# Patient Record
Sex: MALE | Race: WHITE | NOT HISPANIC OR LATINO | Employment: OTHER | URBAN - METROPOLITAN AREA
[De-identification: names, ages, dates, MRNs, and addresses within clinical notes are randomized per-mention and may not be internally consistent; named-entity substitution may affect disease eponyms.]

---

## 2017-01-06 DIAGNOSIS — F51.01 PRIMARY INSOMNIA: ICD-10-CM

## 2017-01-06 RX ORDER — ZOLPIDEM TARTRATE 5 MG/1
5 TABLET ORAL NIGHTLY
Qty: 30 TABLET | Refills: 0 | Status: SHIPPED | OUTPATIENT
Start: 2017-01-06 | End: 2017-02-13 | Stop reason: SDUPTHER

## 2017-01-06 NOTE — TELEPHONE ENCOUNTER
Please call patient and let him know prescription is ready to be picked up.     Reviewed [place]  Aware and verified that 12/29/16 prescription written by Dr. Archibald was never filled.

## 2017-01-12 ENCOUNTER — TELEPHONE (OUTPATIENT)
Dept: INTERNAL MEDICINE | Facility: CLINIC | Age: 67
End: 2017-01-12

## 2017-01-12 NOTE — TELEPHONE ENCOUNTER
----- Message from Noreen Lara sent at 1/11/2017  1:53 PM CST -----  Contact: Patient  Type: Sooner appointment than  is able to schedule    When is the first available appointment? 2/16/17    What is the nature of the appointment? Has not been able to eat much for a few weeks.  He thinks it is getting worse. He is nauseated.      What appointment type:     Comments:  The patient was seen at the  in [place] and believes he needs to see Dr. Thapa.  Please call him at [phone].    Thanks!

## 2017-01-19 ENCOUNTER — CLINICAL SUPPORT (OUTPATIENT)
Dept: ELECTROPHYSIOLOGY | Facility: CLINIC | Age: 67
End: 2017-01-19
Payer: MEDICARE

## 2017-01-19 DIAGNOSIS — R55 SYNCOPE: ICD-10-CM

## 2017-01-19 DIAGNOSIS — Z95.818 STATUS POST PLACEMENT OF IMPLANTABLE LOOP RECORDER: ICD-10-CM

## 2017-01-19 PROCEDURE — 93297 REM INTERROG DEV EVAL ICPMS: CPT | Mod: ,,, | Performed by: INTERNAL MEDICINE

## 2017-01-19 PROCEDURE — 93299 LOOP RECORDER REMOTE: CPT | Mod: PBBFAC | Performed by: INTERNAL MEDICINE

## 2017-01-20 ENCOUNTER — TELEPHONE (OUTPATIENT)
Dept: INTERNAL MEDICINE | Facility: CLINIC | Age: 67
End: 2017-01-20

## 2017-01-20 ENCOUNTER — OFFICE VISIT (OUTPATIENT)
Dept: PHYSICAL MEDICINE AND REHAB | Facility: CLINIC | Age: 67
End: 2017-01-20
Payer: MEDICARE

## 2017-01-20 VITALS
SYSTOLIC BLOOD PRESSURE: 148 MMHG | WEIGHT: 253.88 LBS | HEART RATE: 68 BPM | HEIGHT: 68 IN | BODY MASS INDEX: 38.48 KG/M2 | DIASTOLIC BLOOD PRESSURE: 63 MMHG

## 2017-01-20 DIAGNOSIS — M46.92 SPONDYLITIS, CERVICAL: ICD-10-CM

## 2017-01-20 DIAGNOSIS — M54.40 CHRONIC LOW BACK PAIN WITH SCIATICA, SCIATICA LATERALITY UNSPECIFIED, UNSPECIFIED BACK PAIN LATERALITY: ICD-10-CM

## 2017-01-20 DIAGNOSIS — M19.041 PRIMARY OSTEOARTHRITIS OF RIGHT HAND: Primary | ICD-10-CM

## 2017-01-20 DIAGNOSIS — G89.29 CHRONIC NECK PAIN: ICD-10-CM

## 2017-01-20 DIAGNOSIS — G89.29 CHRONIC LOW BACK PAIN WITH SCIATICA, SCIATICA LATERALITY UNSPECIFIED, UNSPECIFIED BACK PAIN LATERALITY: ICD-10-CM

## 2017-01-20 DIAGNOSIS — M50.30 DDD (DEGENERATIVE DISC DISEASE), CERVICAL: ICD-10-CM

## 2017-01-20 DIAGNOSIS — M54.2 CHRONIC NECK PAIN: ICD-10-CM

## 2017-01-20 DIAGNOSIS — E11.42 DIABETIC PERIPHERAL NEUROPATHY: ICD-10-CM

## 2017-01-20 PROCEDURE — 99214 OFFICE O/P EST MOD 30 MIN: CPT | Mod: S$PBB,,, | Performed by: PHYSICAL MEDICINE & REHABILITATION

## 2017-01-20 PROCEDURE — 99999 PR PBB SHADOW E&M-EST. PATIENT-LVL III: CPT | Mod: PBBFAC,,, | Performed by: PHYSICAL MEDICINE & REHABILITATION

## 2017-01-20 PROCEDURE — 99213 OFFICE O/P EST LOW 20 MIN: CPT | Mod: PBBFAC | Performed by: PHYSICAL MEDICINE & REHABILITATION

## 2017-01-20 RX ORDER — HYDROCODONE BITARTRATE AND ACETAMINOPHEN 10; 325 MG/1; MG/1
1 TABLET ORAL 4 TIMES DAILY
Qty: 120 TABLET | Refills: 0 | Status: SHIPPED | OUTPATIENT
Start: 2017-02-20 | End: 2017-04-18

## 2017-01-20 RX ORDER — HYDROCODONE BITARTRATE AND ACETAMINOPHEN 10; 325 MG/1; MG/1
1 TABLET ORAL 4 TIMES DAILY
Qty: 120 TABLET | Refills: 0 | Status: SHIPPED | OUTPATIENT
Start: 2017-03-20 | End: 2017-02-20 | Stop reason: SDUPTHER

## 2017-01-20 RX ORDER — HYDROCODONE BITARTRATE AND ACETAMINOPHEN 10; 325 MG/1; MG/1
1 TABLET ORAL 4 TIMES DAILY
Qty: 120 TABLET | Refills: 0 | Status: SHIPPED | OUTPATIENT
Start: 2017-01-20 | End: 2017-02-19

## 2017-01-20 RX ORDER — DICLOFENAC SODIUM 10 MG/G
2 GEL TOPICAL 4 TIMES DAILY
Qty: 3 TUBE | Refills: 2 | Status: SHIPPED | OUTPATIENT
Start: 2017-01-20 | End: 2017-10-05

## 2017-01-20 NOTE — MR AVS SNAPSHOT
[facility]  [address]  [facility] [address]  Phone: [phone]                  Vinh Castro   2017 3:30 PM   Office Visit    Description:  Male : 1950   Provider:  Rhea Gaviria MD   Department:  [facility]           Reason for Visit     Hip Pain           Diagnoses this Visit        Comments    Primary osteoarthritis of right hand    -  Primary     Diabetic peripheral neuropathy         Chronic low back pain with sciatica, sciatica laterality unspecified, unspecified back pain laterality         DDD (degenerative disc disease), cervical         Chronic neck pain         Spondylitis, cervical                To Do List           Future Appointments        Provider Department Dept Phone    3/24/2017 2:30 PM [facility] [phone]      Goals (5 Years of Data)     None      Follow-Up and Disposition     Return in about 3 months (around 2017).       These Medications        Disp Refills Start End    hydrocodone-acetaminophen 10-325mg (NORCO)  mg Tab 120 tablet 0 2017    Take 1 tablet by mouth 4 (four) times daily. - Oral    Pharmacy: [id number] - [place], [place] - [address] Ph #: [phone]       hydrocodone-acetaminophen 10-325mg (NORCO)  mg Tab 120 tablet 0 2017 3/22/2017    Take 1 tablet by mouth 4 (four) times daily. - Oral    Pharmacy: [facility] - [place], [place] - [address] Ph #: [phone]       hydrocodone-acetaminophen 10-325mg (NORCO)  mg Tab 120 tablet 0 3/20/2017 2017    Take 1 tablet by mouth 4 (four) times daily. - Oral    Pharmacy: [facility] - [place], [place] - [address] Ph #: [phone]       diclofenac sodium (VOLTAREN) 1 % Gel 3 Tube 2 2017    Apply 2 g topically 4  (four) times daily. Apply to both hand - Topical    Pharmacy: [id number] - [place], [place] - [address] AT [address] of [facility] #: [phone]         [facility]     [facility] On Call Nurse Care Line - 24/7 Assistance  Registered nurses in the [facility] provide clinical advisement, health education, appointment booking, and other advisory services.  Call for this free service at [phone].             Medications           Message regarding Medications     Verify the changes and/or additions to your medication regime listed below are the same as discussed with your clinician today.  If any of these changes or additions are incorrect, please notify your healthcare provider.        START taking these NEW medications        Refills    hydrocodone-acetaminophen 10-325mg (NORCO)  mg Tab 0    Starting on: 2/20/2017    Sig: Take 1 tablet by mouth 4 (four) times daily.    Class: Print    Route: Oral    hydrocodone-acetaminophen 10-325mg (NORCO)  mg Tab 0    Starting on: 3/20/2017    Sig: Take 1 tablet by mouth 4 (four) times daily.    Class: Print    Route: Oral    diclofenac sodium (VOLTAREN) 1 % Gel 2    Sig: Apply 2 g topically 4 (four) times daily. Apply to both hand    Class: Normal    Route: Topical           Verify that the below list of medications is an accurate representation of the medications you are currently taking.  If none reported, the list may be blank. If incorrect, please contact your healthcare provider. Carry this list with you in case of emergency.           Current Medications     alprazolam (XANAX) 0.5 MG tablet Take 0.5 mg by mouth daily as needed.     aspirin 81 MG Chew Take 81 mg by mouth once daily.    ASPIRIN/ACETAMINOPHEN/CAFFEINE (EXCEDRIN EXTRA STRENGTH ORAL) Take by mouth as needed.    blood sugar diagnostic (ACCU-CHEK JANEY PLUS TEST STRP) Strp Patient to check blood sugar 6 x's per day--Dx Code 250.62,  357.2    calcium  "carbonate-simethicone (MAALOX ADVANCED) 1,000-60 mg Chew Take 2 tablets by mouth 3 (three) times daily as needed.    cholecalciferol, vitamin D3, (VITAMIN D3) 5,000 unit Tab Take 5,000 Units by mouth. Takes one tablet every day except on Sun and Wed    ciprofloxacin HCl (CILOXAN) 0.3 % ophthalmic solution     CREON 24,000-76,000 -120,000 unit capsule TAKE 2 CAPSULES BY MOUTH THREE TIMES DAILY WITH MEALS, 2 WITH MEALS AND 1 WITH SNACK    DEXTROSE (GLUCOSE) Chew Take by mouth as needed.    ELIQUIS 5 mg Tab TAKE 1 TABLET BY MOUTH TWICE DAILY    esomeprazole (NEXIUM) 40 MG capsule TAKE 1 CAPSULE BY MOUTH EVERY DAY    ferrous sulfate 325 mg (65 mg iron) Tab tablet TAKE 1 TABLET BY MOUTH EVERY DAY    fexofenadine (ALLEGRA) 180 MG tablet Take 180 mg by mouth once daily.    fluocinolone-shower cap 0.01 % Oil     fluticasone (FLONASE) 50 mcg/actuation nasal spray SHAKE WELL AND USE 2 SPRAYS IN EACH NOSTRIL EVERY DAY    furosemide (LASIX) 80 MG tablet Take 80 mg by mouth once daily.    guaifenesin (MUCINEX) 600 mg 12 hr tablet Take 600 mg by mouth continuous prn for Congestion. Prn      HUMULIN R U-500, CONCENTRATED, 500 unit/mL Soln INJECT INTO SKIN 32 UNIT JORDY ON REGULAR U-100 SYRINGE BEFORE MEALS AND WITH PM SNACK    insulin needles, disposable, (BD ULTRA-FINE REJI PEN NEEDLES) 32 x 5/32 " Ndle Uses 5 daily, on multiple daily insulin injections    insulin syringe-needle U-100 1/2 mL 30 Syrg     insulin syringe-needle U-100 1/2 mL 30 Syrg USE WITH INSULIN THREE TIMES DAILY TO FOUR TIMES DAILY    isosorbide mononitrate (IMDUR) 30 MG 24 hr tablet Take 1 tablet (30 mg total) by mouth every evening.    isosorbide mononitrate (IMDUR) 30 MG 24 hr tablet TAKE ONE TABLET BY MOUTH ONCE DAILY    KRILL/OM3/DHA/EPA/OM6/LIP/ASTX (KRILL OIL, OMEGA 3 & 6, ORAL) Take 1 tablet by mouth every evening.     lancets (BD ULTRA FINE LANCETS) Misc 1 Units by Misc.(Non-Drug; Combo Route) route 4 (four) times daily.    levothyroxine (SYNTHROID) 25 " MCG tablet Take 1 tablet (25 mcg total) by mouth once daily.    losartan (COZAAR) 50 MG tablet TAKE 1 TABLET BY MOUTH ONCE DAILY    metolazone (ZAROXOLYN) 5 MG tablet Take 1 tablet (5 mg total) by mouth daily as needed (Weight gain of 5 pounds or more).    metoprolol tartrate (LOPRESSOR) 50 MG tablet Take 1 tablet (50 mg total) by mouth 2 (two) times daily.    moxifloxacin (AVELOX) 400 mg tablet TK 1 T PO QD FOR 8 DAYS    MV-MN/FA/LYCOPENE/LUT/HB#178 (RAMEZ MULTIVITAMIN FOR MEN ORAL) Take by mouth.    NEVANAC 0.1 % ophthalmic suspension     nitroGLYCERIN (NITROSTAT) 0.4 MG SL tablet Place 1 tablet (0.4 mg total) under the tongue every 5 (five) minutes as needed.    ofloxacin (FLOXIN) 0.3 % otic solution Place 3 drops into both ears 2 (two) times daily.     oxymetazoline (MUCINEX SINUS-MAX) 0.05 % nasal spray 2 sprays by Nasal route once daily.    PATADAY 0.2 % Drop Place 1 drop into both eyes once daily.     potassium chloride SA (K-DUR,KLOR-CON) 20 MEQ tablet TAKE 1 TABLET(20 MEQ) BY MOUTH EVERY DAY    tamsulosin (FLOMAX) 0.4 mg Cp24 TAKE 1 CAPSULE BY MOUTH EVERY DAY    triamcinolone (NASACORT) 55 mcg nasal inhaler 2 sprays by Nasal route once daily.    triamcinolone acetonide 0.1% (KENALOG) 0.1 % cream     VITAMIN D2 50,000 unit capsule TAKE 1 CAPSULE BY MOUTH 2 TIMES A WEEK    zolpidem (AMBIEN) 5 MG Tab Take 1 tablet (5 mg total) by mouth every evening.    diclofenac sodium (VOLTAREN) 1 % Gel Apply 2 g topically 4 (four) times daily. Apply to both hand    gabapentin (NEURONTIN) 600 MG tablet Take 1 tablet (600 mg total) by mouth 4 (four) times daily with meals and nightly.    hydrocodone-acetaminophen 10-325mg (NORCO)  mg Tab Take 1 tablet by mouth 4 (four) times daily.    hydrocodone-acetaminophen 10-325mg (NORCO)  mg Tab Starting on Feb 20, 2017. Take 1 tablet by mouth 4 (four) times daily.    hydrocodone-acetaminophen 10-325mg (NORCO)  mg Tab Starting on Mar 20, 2017. Take 1 tablet by mouth 4  "(four) times daily.           Clinical Reference Information           Vital Signs - Last Recorded  Most recent update: 1/20/2017  3:45 PM by Marbella Thomas MA    BP Pulse Ht Wt BMI    (!) 148/63 68 5' 8" (1.727 m) 115.2 kg (253 lb 13.8 oz) 38.6 kg/m2      Blood Pressure          Most Recent Value    BP  (!)  148/63      Allergies as of 1/20/2017     Cephalexin    Penicillins    Pravastatin    Sulfa (Sulfonamide Antibiotics)      Immunizations Administered on Date of Encounter - 1/20/2017     None      "

## 2017-01-20 NOTE — TELEPHONE ENCOUNTER
----- Message from Walter Estrada sent at 1/20/2017  4:35 PM CST -----  Contact: Self [phone]  Type: Sooner appointment than  is able to schedule    When is the first available appointment? 03/14/2017    What is the nature of the appointment? Nausea and Constipation among other things     What appointment type: EP    Comments:Requesting call back advice    Thanks

## 2017-01-20 NOTE — TELEPHONE ENCOUNTER
Left mess for patient to return call----scheduled appt for Tuesday afternoon---need to clarify if appt ok with patient

## 2017-01-21 ENCOUNTER — DOCUMENTATION ONLY (OUTPATIENT)
Dept: INTERNAL MEDICINE | Facility: CLINIC | Age: 67
End: 2017-01-21

## 2017-01-21 NOTE — PROGRESS NOTES
""This note will not be shared with the patient."Subjective:       Patient ID: Vinh Castro is a 66 y.o. male.    Chief Complaint: Hip Pain    Hip Pain      Back Pain   Associated symptoms include leg pain. Pertinent negatives include no abdominal pain, chest pain, fever or headaches.   Hand Pain    Pertinent negatives include no chest pain.   Neck Pain    Associated symptoms include leg pain. Pertinent negatives include no chest pain, fever, headaches or trouble swallowing.   Leg Pain      Shoulder Pain    Pertinent negatives include no fever or headaches.      Subjective:       Patient ID: Vinh Castro is a 66 y.o. male.    Chief Complaint: Hip Pain  Mr. Castro returns  to clinic for  chronic back and leg pain.    Current low back pain is 5, worst pain 7-8.   Back pain runs to left hip and does not radiate that much down the left leg to knee and bellow the knee, at outside aspect of leg, but not to ankle/ foot.   As before. Pain in left hip is still present as  burning pain. He has diabetic polyneuropathy.  He reports back and hip pain being constant, burning pain.   Sitting  makes pain worse, lying down improves the pain.  Also pain with walking, able to walk  ~ 2 blocks.  No true weakness in legs, no numbness, paraesthesias.  No BB incontinence., no saddle anesthesia.  Takes Gabapentin 600 mg po TID, not sure if it is helping.   Tolerates well Hydrocodone,and wants to continue the same medications.   He would like to go to Physical therapy again if possible   Here for follow up, re evaluation, and medications adjustment.      Review of Systems   Constitutional: Negative for activity change, appetite change, chills, diaphoresis, fatigue, fever and unexpected weight change.   HENT: Negative for trouble swallowing and voice change.    Eyes: Negative for pain and visual disturbance.   Respiratory: Negative for chest tightness, shortness of breath and wheezing.    Cardiovascular: Negative for chest pain, palpitations " and leg swelling.   Gastrointestinal: Negative for abdominal pain, constipation and diarrhea.   Genitourinary: Negative for difficulty urinating, frequency and urgency.   Musculoskeletal: Positive for back pain and neck pain. Negative for arthralgias, joint swelling, myalgias and neck stiffness.   Skin: Negative for rash and wound.   Neurological: Negative for dizziness, facial asymmetry, speech difficulty, light-headedness and headaches.   Hematological: Negative for adenopathy.   Psychiatric/Behavioral: Negative for agitation, behavioral problems, confusion, decreased concentration, dysphoric mood and sleep disturbance.         Objective:      Physical Exam    GENERAL: The patient is alert, oriented, pleasant, obese.   MUSCULOSKELETAL:   Gait is improved, stronger, walks without cane, on wide basis, small steps, improved balance.  Cervical spine full range of motion in all planes.   Lumbar spine, decreased active range of motion in all planes, flexion to 90 degrees,    Side bending and rotation to 20-25 degrees bilateral.   Positive facet loading bilateral..  Straight leg raising positive bilaterally.   Full range of motion in all joints x4 extremities.   Muscle strength 5/5 throughout x4 extremities.   No  joint laxity throughout x4 extremities.   NEUROLOGIC: Cranial nerves II through XII intact.   Deep tendon reflexes is normal, +2 in the upper and lower extremities bilaterally.   Muscle tone is normal.   Sensory is intact to light touch and pinprick throughout x 4 extremities.     Xray of Lumbar spine.   Lumbar spine demonstrates mild to moderate DJD. Marginal osteophytes seen at multiple levels.   Facet hypertrophy seen at the lower levels. Vertebral body heights are fairly well maintained.   Flexion-extension views show no evidence of instability     MRI of Lumbar spine showed ( 10/23/14):   L4-5: Circumferential disk bulge, bilateral facet arthropathy, and ligamentum flavum hypertrophy results in moderate  bilateral neural foraminal narrowing    and no significant spinal canal narrowing.  L5-S1: Severe bilateral facet arthropathy and ligament flavum hypertrophy resulting in significant spinal canal or neural foraminal narrowing.   Impression:  Multilevel lumbar spondylosis as detailed above resulting in moderate bilateral neural foraminal narrowing at L4-5 and mild bilateral neural foraminal narrowing at L3-4.    Assessment:       1. Primary osteoarthritis of right hand    2. Diabetic peripheral neuropathy    3. Chronic low back pain with sciatica, sciatica laterality unspecified, unspecified back pain laterality    4. DDD (degenerative disc disease), cervical    5. Chronic neck pain    6. Spondylitis, cervical        Plan:       Primary osteoarthritis of right hand  -     hydrocodone-acetaminophen 10-325mg (NORCO)  mg Tab; Take 1 tablet by mouth 4 (four) times daily.  Dispense: 120 tablet; Refill: 0  -     hydrocodone-acetaminophen 10-325mg (NORCO)  mg Tab; Take 1 tablet by mouth 4 (four) times daily.  Dispense: 120 tablet; Refill: 0  -     hydrocodone-acetaminophen 10-325mg (NORCO)  mg Tab; Take 1 tablet by mouth 4 (four) times daily.  Dispense: 120 tablet; Refill: 0  -     diclofenac sodium (VOLTAREN) 1 % Gel; Apply 2 g topically 4 (four) times daily. Apply to both hand  Dispense: 3 Tube; Refill: 2    Diabetic peripheral neuropathy  -     hydrocodone-acetaminophen 10-325mg (NORCO)  mg Tab; Take 1 tablet by mouth 4 (four) times daily.  Dispense: 120 tablet; Refill: 0  -     hydrocodone-acetaminophen 10-325mg (NORCO)  mg Tab; Take 1 tablet by mouth 4 (four) times daily.  Dispense: 120 tablet; Refill: 0  -     hydrocodone-acetaminophen 10-325mg (NORCO)  mg Tab; Take 1 tablet by mouth 4 (four) times daily.  Dispense: 120 tablet; Refill: 0    Chronic low back pain with sciatica, sciatica laterality unspecified, unspecified back pain laterality  -     hydrocodone-acetaminophen 10-325mg  (NORCO)  mg Tab; Take 1 tablet by mouth 4 (four) times daily.  Dispense: 120 tablet; Refill: 0  -     hydrocodone-acetaminophen 10-325mg (NORCO)  mg Tab; Take 1 tablet by mouth 4 (four) times daily.  Dispense: 120 tablet; Refill: 0  -     hydrocodone-acetaminophen 10-325mg (NORCO)  mg Tab; Take 1 tablet by mouth 4 (four) times daily.  Dispense: 120 tablet; Refill: 0    DDD (degenerative disc disease), cervical  -     hydrocodone-acetaminophen 10-325mg (NORCO)  mg Tab; Take 1 tablet by mouth 4 (four) times daily.  Dispense: 120 tablet; Refill: 0  -     hydrocodone-acetaminophen 10-325mg (NORCO)  mg Tab; Take 1 tablet by mouth 4 (four) times daily.  Dispense: 120 tablet; Refill: 0  -     hydrocodone-acetaminophen 10-325mg (NORCO)  mg Tab; Take 1 tablet by mouth 4 (four) times daily.  Dispense: 120 tablet; Refill: 0    Chronic neck pain  -     hydrocodone-acetaminophen 10-325mg (NORCO)  mg Tab; Take 1 tablet by mouth 4 (four) times daily.  Dispense: 120 tablet; Refill: 0  -     hydrocodone-acetaminophen 10-325mg (NORCO)  mg Tab; Take 1 tablet by mouth 4 (four) times daily.  Dispense: 120 tablet; Refill: 0  -     hydrocodone-acetaminophen 10-325mg (NORCO)  mg Tab; Take 1 tablet by mouth 4 (four) times daily.  Dispense: 120 tablet; Refill: 0    Spondylitis, cervical  -     hydrocodone-acetaminophen 10-325mg (NORCO)  mg Tab; Take 1 tablet by mouth 4 (four) times daily.  Dispense: 120 tablet; Refill: 0  -     hydrocodone-acetaminophen 10-325mg (NORCO)  mg Tab; Take 1 tablet by mouth 4 (four) times daily.  Dispense: 120 tablet; Refill: 0  -     hydrocodone-acetaminophen 10-325mg (NORCO)  mg Tab; Take 1 tablet by mouth 4 (four) times daily.  Dispense: 120 tablet; Refill: 0        Patient with worsening of chronic back pain, secondary to lumbar spondylosis, facet arthropathy, and  possible lumbar radiculopathy.  Also with Rt hand   1. Discussed the back  and left hip/leg pain pain, its course and treatment.   Questions answered.   Discussed appropriate home exercise program.    2. Pain management:   Hydrocodone 10/325 mg po q6 hrs prn pain, #120,  2 refills.  Will resume  Neurontin 600 mg po tid if tolerated ( one in am / noon/ at bedtime).  I will stopped Valium for prn muscle spasms ( since patient was using more for sleeping that for muscle spasm).     3.. Diagnostics/Imaging:   Xrays of lumbar spine and MRI.of lumbar spine results discussed again in details with patient and his wife.    4. Resume Outpatient PT for back pain.  Offered OT for Rt hand weakness, or inability to close hand.    Follow up in 3 months    Total time spent face to face with patient was 25 minutes.   More than 50% of that time was spent in counseling on diagnosis , prognosis and treatment options.   I also caunsel patient  on common and most usual side effect of prescribed medications.   Risk and benefits of opiates, possible risk of developing opiate dependence and tolerance, need of strict compliance with prescribed medications.  I reviewed Primary care , and other specialty's notes to better coordinate patient's  care.   All questions were answered, and patient voiced understanding.

## 2017-01-21 NOTE — PROGRESS NOTES
Pre-Visit Review & Summary:    67 y/o male with hx of Morbid Obesity, poorly controlled Type 2 DM, Diabetic Nephropathy (CKD-3), Diabetic Retinopathy, Diabetic Peripheral Sensory Neuropathy, Diabetic Autonomic Neuropathy (Syncope and Hypotension), HTN, SEAN, Paroxysmal Atrial Fib on Eliquis, CAD, HFpEF, Pancreatic Insufficiency,Hypothyroidism, and Chronic Venous insufficiency has a scheduled urgent care appt 1/24/17 for nausea and constipation.    He has a hx of Chronic Nausea from Diabetic Gastroparesis and hx of GERD for which he takes Nexium 40 mg/day. Last EGD (4/2014): Hyperplastic Gastric and duodenal Polyps. Bx: Chronic Active Gastritis; (-) H.pylori. He has hx of Pancreatic Insufficiency with Chronic Diarrhea - findings c/w EUS 2/23/15. He takes Creon TID with meals for Diarrhea. He has a Hx of Colonic Polyps. Last Colonoscopy 10/2012 - tubular adenoma; Repeat Colonoscopy is due 10/2017               PMH    1. Type 2 DM               Poorly controlled              Followed in Endocrine             Humulin U-500 Insulin - 33 QID    2. Diabetic Retinopathy     3. Diabetic Nephropathy with CKD-3              Sub-nephrotic range Proteinuria              Losartan 50 mg/day    4. Diabetic Peripheral Sensory Neuropathy             Gabapentin 600 mg TID     5. Diabetic Autonomic Neuropathy             Sx include Recurrent Dizziness & Syncope - (+) Tilt Test             Neuro Consult agreed with Dx of Autonomic Neuropathy as etiology of dizziness & syncope             (-) MRA Brain 5/2014           Chronic Nausea from Diabetic Gastroparesis     6. Paroxysmal Atrial Fib             CHADS-4 - on Eliquis           S/P Ablation 8/2014 & 8/2015           Followed by Dr. MIKE Galloway           Loop Recorder Implantation 5/23/16, checked monthly (NSR with occ AT)    7. CAD with HFpEF           Lasix 80 mg/day           S/P CABG 2001, PCI 2002             PET Stress Scan 11/2012 - small perfusion defect                                                           In area of D2; < 1 % of myocardium           PET Stress Test 7/21/14: no clinically significant perfusion defects, no change             Cardiac Cath 12/2014 - patent coronary arteries    8. HTN              Losartan 50 mg/day, Lopressor 50 mg BID, Lasix 80 mg daily     9. Chronic Venous Insufficency             Lasix 80 mg daily           Venous Ultrasound 7/2013 - negative for DVT     10. Morbid Obesity (BMI-41)    11. SEAN              CPAP     12. Hyperlipidemia              Pravastatin 80 mg/day - discontinued b/c severe myalgia             Fish Oil -3 Omega FA 2 g/day     13. GERD              Nexium 40 mg/day; Zantac prn              EGD 4/2014: Gastric and duodenal Polyps - hyperplastic polyps                                    Bx: Chronic Active Gastritis; (-) H.pylori     14. Pancreatic Insufficiency with Chronic Diarrhea             Confirmed on EUS 2/23/15             Creon TID with meals    15. (B) Mild ICA Stenosis                Carotid US 7/2013 < 40 % stenosis; no change from 2012     16. Hx of Kidney Stones     17. BPH, ED, Peyronie's Disease               Tamsulosin; followed in Urology clinic     18. (B) Sensorineural Hearing Loss     19. Chronic Dizziness                c/w Diabetic Autonomic Neuropathy - Neurology (Dr. Maria)                (+) Tilt Test; (-) MRA Brain 5/2014              ENT Evaluation(Dr. Marquez): (-) VNG 9/2012                CT and MRI Brain: chronic ischemic microvascular disease                Vascular Medicine (2/2013): Mild ICA stenosis                                     No evidence of VB insufficiency or Carotid Hypersensitivity     20. Allergic Rhinitis                Flonase Nasal Spray,  Allegra     21. DJD C/S, L/S, L-Hip              MRI C/S (10/2012): multilevel DJD; C5-6 Neuroforaminal Narrowing                Followed by Dr. Gaviria (Physical Medicine)              Norco  mg q 6 hrs, Gabapentin 600 mg QID    22. Chronic  Anemia     23. Hx of Colonic Polyps             Colonoscopy 10/2012 - tubular adenoma; Repeat Colonoscopy 10/2017    24. Vitamin D Deficiency               Ergocalciferol 50,000 units 2 x a week    25. Depression, Anxiety, & Insomnia              Ambien 10 mg hs, Xanax     26. Glaucoma              Pataday Drops     27. Hx of Costochondritis (6/2013) - atypical chest pain - chest wall pain    28. Hepatic Steatosis                Elevated Transaminases; (-) Hepatitis Panel                Abdominal US c/w PARRISH     29. Hypothyroidism              Synthroid 25 mcg/day      Meds:             Albuterol Inhaler prn             Allegra           Amlodipine 5 mg/day - unclear if taking           Cholecalciferol 400 units daily           Co-enzyme Q-10 200 mg BID            Creon - 2 with meals            Eliquis (Apixaban) 5 mg BID           Ergocalciferol (ERGOCALCIFEROL) 50,000 units twice weekly             FLONASE 50 mcg/actuation nasal spray, BID                   Lasix 40 mg daily             Gabapentin 600 mg TID             Hydrocodone-acetaminophen (Norco)  mg q 6 h prn             Imdur 30 mg/day           Insulin (Humulin R) U-500 33-33-33-33           KCL 20 meq/day           Krill Oil/[place] 3 FA             Lasix 80 mg/day           Losartan 75 mg/day - unclear if taking           Metoprolol tartrate 100 mg BID - unclear if taking           Nexium 40 mg/day           NitroGLYCERIN (NITROSTAT) 0.4 MG SL prn             Pataday eye Drops             Synthroid 25 mcg/day           Tamsulosin (FLOMAX) 0.4 mg Cp24, daily           Triamcinolone (Kenalog) Cream 0.1%             Valium 2 mg - not taking           Xanax 0.5 mg prn - not taking           Zolpidem 10 mg hs prn       Allergies:           Cephalosporins         PCN           Sulfa     Surgical Hx:           Tonsillectomy           Cholecystectomy            Inguinal Hernia Repair            CABG - LAD & OM 2001            PCI Angioplasty 2002             Cardiac Ablation for AF - 8/2014          Hand Surgery            Eye Surgery            Left Knee Arthroscopic Sx - Meniscectomy    Social Hx:          Non-smoker          ETOH: none                    Retired Manager at [employer]     Recent Lab:          Annual PE: 5/3/16          CBC (5/3/16): 11.9/36.1          S. Ferritin (5/3/16): 188          TSH (4/2015): 2.875                    (4/30/6): 12.483                    (5/3/16): 5.838          Free T4 (5/3/16): 1.40          Free T3 (5/3/16): 2.00 (2.3-4.2)          Hgb A1c (4/2016): 9.2                         (10/2016): 10.8          Lipids (4/3/16): Chol-205, TG-248, HDL-29, LDL-126          BMP (10/2016): BUN-32, Cr-2.0, eGFR-31, Glu-380, K-5.3    IMP:  1. Nausea - Hx of GERD and Diabetic Gastroparesis.    2. Constipation - aggravated by daily [place]. Needs TSH rechecked.  3. Poorly controlled Type 2 DM with Diabetic Retinopathy, Nephropathy, Peripheral Sensory Neuropathy  4. Autonomic Neuropathy with Hypotension & Syncopal Spells  5. HTN  6. Paroxysmal Atrial Fib, on Eliquis  7. CAD without Angina  8. HFpEF  9. Pancreatic Insufficiency  10. GERD  11. Morbid Obesity  12. SEAN - declines referral to Sleep clinic  13. Chronic Venous Insufficiency  14. 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 Vit D Deficiency  16. Hypothyroidism  17. Hx of Colon Polyps  18. Depression - declined antidepressants  19. AR  20. BPH, ED  21. Glaucoma  22. DJD Hip, C/S and L/S Spine  23. Chronic Opiate Use  24. Chronic Anemia  25. Hx of Kidney Stones    Plan:  1. CBC, CMP, Hgb A1c, TSH, free T4,   C-Scope and EGD   2. Metamucil, Colace, (consider Lactulose or Miralax prn)  3. Increase Nexium 40 mg BID  4. Tdp, Flu Vaccine, Prevnar 13  5. GI consult

## 2017-01-24 ENCOUNTER — LAB VISIT (OUTPATIENT)
Dept: LAB | Facility: HOSPITAL | Age: 67
End: 2017-01-24
Attending: INTERNAL MEDICINE
Payer: MEDICARE

## 2017-01-24 ENCOUNTER — OFFICE VISIT (OUTPATIENT)
Dept: INTERNAL MEDICINE | Facility: CLINIC | Age: 67
End: 2017-01-24
Payer: MEDICARE

## 2017-01-24 ENCOUNTER — TELEPHONE (OUTPATIENT)
Dept: INTERNAL MEDICINE | Facility: CLINIC | Age: 67
End: 2017-01-24

## 2017-01-24 VITALS
HEART RATE: 65 BPM | TEMPERATURE: 99 F | DIASTOLIC BLOOD PRESSURE: 63 MMHG | WEIGHT: 255.94 LBS | BODY MASS INDEX: 38.79 KG/M2 | SYSTOLIC BLOOD PRESSURE: 161 MMHG | HEIGHT: 68 IN

## 2017-01-24 DIAGNOSIS — N18.30 CKD (CHRONIC KIDNEY DISEASE) STAGE 3, GFR 30-59 ML/MIN: ICD-10-CM

## 2017-01-24 DIAGNOSIS — E03.9 ACQUIRED HYPOTHYROIDISM: ICD-10-CM

## 2017-01-24 DIAGNOSIS — E78.5 HYPERLIPIDEMIA, UNSPECIFIED HYPERLIPIDEMIA TYPE: ICD-10-CM

## 2017-01-24 DIAGNOSIS — K21.9 GASTROESOPHAGEAL REFLUX DISEASE, ESOPHAGITIS PRESENCE NOT SPECIFIED: ICD-10-CM

## 2017-01-24 DIAGNOSIS — R11.0 NAUSEA: Primary | ICD-10-CM

## 2017-01-24 DIAGNOSIS — K76.0 STEATOSIS OF LIVER: ICD-10-CM

## 2017-01-24 DIAGNOSIS — I10 ESSENTIAL HYPERTENSION: ICD-10-CM

## 2017-01-24 DIAGNOSIS — E11.42 DIABETIC PERIPHERAL NEUROPATHY: ICD-10-CM

## 2017-01-24 DIAGNOSIS — I48.0 PAROXYSMAL ATRIAL FIBRILLATION: ICD-10-CM

## 2017-01-24 DIAGNOSIS — R14.2 BELCHING: ICD-10-CM

## 2017-01-24 DIAGNOSIS — Z79.01 LONG TERM CURRENT USE OF ANTICOAGULANT THERAPY: ICD-10-CM

## 2017-01-24 DIAGNOSIS — E66.01 MORBID OBESITY DUE TO EXCESS CALORIES: ICD-10-CM

## 2017-01-24 DIAGNOSIS — I25.10 CORONARY ARTERY DISEASE INVOLVING NATIVE CORONARY ARTERY OF NATIVE HEART WITHOUT ANGINA PECTORIS: ICD-10-CM

## 2017-01-24 DIAGNOSIS — R11.0 NAUSEA: ICD-10-CM

## 2017-01-24 DIAGNOSIS — I50.30 HEART FAILURE WITH PRESERVED LEFT VENTRICULAR FUNCTION (HFPEF): ICD-10-CM

## 2017-01-24 DIAGNOSIS — E11.21 DIABETIC NEPHROPATHY ASSOCIATED WITH TYPE 2 DIABETES MELLITUS: ICD-10-CM

## 2017-01-24 DIAGNOSIS — G47.33 OSA (OBSTRUCTIVE SLEEP APNEA): ICD-10-CM

## 2017-01-24 LAB
ALBUMIN SERPL BCP-MCNC: 3.6 G/DL
ALP SERPL-CCNC: 114 U/L
ALT SERPL W/O P-5'-P-CCNC: 113 U/L
ANION GAP SERPL CALC-SCNC: 11 MMOL/L
AST SERPL-CCNC: 135 U/L
BASOPHILS # BLD AUTO: 0.06 K/UL
BASOPHILS NFR BLD: 0.7 %
BILIRUB SERPL-MCNC: 0.6 MG/DL
BUN SERPL-MCNC: 36 MG/DL
CALCIUM SERPL-MCNC: 9.9 MG/DL
CHLORIDE SERPL-SCNC: 94 MMOL/L
CO2 SERPL-SCNC: 24 MMOL/L
CREAT SERPL-MCNC: 2 MG/DL
DIFFERENTIAL METHOD: ABNORMAL
EOSINOPHIL # BLD AUTO: 0.1 K/UL
EOSINOPHIL NFR BLD: 1.4 %
ERYTHROCYTE [DISTWIDTH] IN BLOOD BY AUTOMATED COUNT: 13.7 %
EST. GFR  (AFRICAN AMERICAN): 38.8 ML/MIN/1.73 M^2
EST. GFR  (NON AFRICAN AMERICAN): 33.5 ML/MIN/1.73 M^2
GLUCOSE SERPL-MCNC: 132 MG/DL (ref 70–110)
GLUCOSE SERPL-MCNC: 503 MG/DL
HCT VFR BLD AUTO: 36.6 %
HGB BLD-MCNC: 12.4 G/DL
LIPASE SERPL-CCNC: 15 U/L
LYMPHOCYTES # BLD AUTO: 1.1 K/UL
LYMPHOCYTES NFR BLD: 12.5 %
MCH RBC QN AUTO: 32.9 PG
MCHC RBC AUTO-ENTMCNC: 33.9 %
MCV RBC AUTO: 97 FL
MONOCYTES # BLD AUTO: 0.8 K/UL
MONOCYTES NFR BLD: 8.7 %
NEUTROPHILS # BLD AUTO: 6.7 K/UL
NEUTROPHILS NFR BLD: 76.1 %
PLATELET # BLD AUTO: 189 K/UL
PMV BLD AUTO: 10.6 FL
POTASSIUM SERPL-SCNC: 5.6 MMOL/L
PROT SERPL-MCNC: 8.8 G/DL
RBC # BLD AUTO: 3.77 M/UL
SODIUM SERPL-SCNC: 129 MMOL/L
T4 FREE SERPL-MCNC: 1.41 NG/DL
TSH SERPL DL<=0.005 MIU/L-ACNC: 0.72 UIU/ML
WBC # BLD AUTO: 8.78 K/UL

## 2017-01-24 PROCEDURE — 85025 COMPLETE CBC W/AUTO DIFF WBC: CPT

## 2017-01-24 PROCEDURE — 84443 ASSAY THYROID STIM HORMONE: CPT

## 2017-01-24 PROCEDURE — 36415 COLL VENOUS BLD VENIPUNCTURE: CPT

## 2017-01-24 PROCEDURE — 80053 COMPREHEN METABOLIC PANEL: CPT

## 2017-01-24 PROCEDURE — 84439 ASSAY OF FREE THYROXINE: CPT

## 2017-01-24 PROCEDURE — 99999 PR PBB SHADOW E&M-EST. PATIENT-LVL III: CPT | Mod: PBBFAC,,, | Performed by: INTERNAL MEDICINE

## 2017-01-24 PROCEDURE — 99214 OFFICE O/P EST MOD 30 MIN: CPT | Mod: S$PBB,,, | Performed by: INTERNAL MEDICINE

## 2017-01-24 PROCEDURE — 83690 ASSAY OF LIPASE: CPT

## 2017-01-24 PROCEDURE — 83036 HEMOGLOBIN GLYCOSYLATED A1C: CPT

## 2017-01-24 RX ORDER — PANTOPRAZOLE SODIUM 40 MG/1
40 TABLET, DELAYED RELEASE ORAL 2 TIMES DAILY
Qty: 60 TABLET | Refills: 0 | Status: SHIPPED | OUTPATIENT
Start: 2017-01-24 | End: 2017-02-19 | Stop reason: SDUPTHER

## 2017-01-24 RX ORDER — ONDANSETRON 4 MG/1
4 TABLET, FILM COATED ORAL
Status: DISCONTINUED | OUTPATIENT
Start: 2017-01-24 | End: 2017-01-24

## 2017-01-24 RX ORDER — ONDANSETRON 4 MG/1
4 TABLET, FILM COATED ORAL EVERY 8 HOURS PRN
Qty: 30 TABLET | Refills: 0 | Status: SHIPPED | OUTPATIENT
Start: 2017-01-24 | End: 2017-02-21 | Stop reason: SDUPTHER

## 2017-01-24 NOTE — PROGRESS NOTES
Subjective:       Patient ID: Vinh Castro is a 66 y.o. male.    Chief Complaint: Constipation and Nausea    HPI Comments: 65 y/o male with hx of Morbid Obesity, poorly controlled Type 2 DM, Diabetic Nephropathy (CKD-3), Diabetic Retinopathy, Diabetic Peripheral Sensory Neuropathy, Diabetic Autonomic Neuropathy (Syncope and Hypotension), HTN, SEAN, Paroxysmal Atrial Fib on Eliquis, CAD, HFpEF, Pancreatic Insufficiency,Hypothyroidism, and Chronic Venous insufficiency comes in for an urgent care visit complaining of nausea and constipation. He notes Nausea x 2 months, indigestion, increasing gas and belching. He denies fever, regurgitation, vomiting, chest pain, blood in stool, melena, abdominal pain. He also notes intermittent constipation. He had normal BM today. His Blood Sugar readings at home have been 130-400. He denies hypoglycemic episodes. Blood sugar in office today is 132. He continues to take Nexium 40 mg daily and GASex prn.    He has a hx of Chronic Nausea from Diabetic Gastroparesis and hx of GERD for which he takes Nexium 40 mg/day. Last EGD (4/2014): Hyperplastic Gastric and duodenal Polyps. Bx: Chronic Active Gastritis; (-) H.pylori. He has hx of Pancreatic Insufficiency with Chronic Diarrhea - findings noted on EUS 2/23/15. He takes Creon TID with meals for Diarrhea. He has a Hx of Colonic Polyps. Last Colonoscopy 10/2012 - tubular adenoma; Repeat Colonoscopy is due 10/2017        PMH    1. Type 2 DM               Poorly controlled              Followed in Endocrine             Humulin U-500 Insulin - 33 QID    2. Diabetic Retinopathy     3. Diabetic Nephropathy with CKD-3              Sub-nephrotic range Proteinuria              Losartan 50 mg/day    4. Diabetic Peripheral Sensory Neuropathy             Gabapentin 600 mg TID     5. Diabetic Autonomic Neuropathy             Sx include Recurrent Dizziness & Syncope - (+) Tilt Test             Neuro Consult agreed with Dx of Autonomic Neuropathy as  etiology of dizziness & syncope             (-) MRA Brain 5/2014           Chronic Nausea from Diabetic Gastroparesis     6. Paroxysmal Atrial Fib             CHADS-4 - on Eliquis           S/P Ablation 8/2014 & 8/2015           Followed by Dr. MIKE Galloway           Loop Recorder Implantation 5/23/16, checked monthly (NSR with occ AT)    7. CAD with HFpEF           Lasix 80 mg/day           S/P CABG 2001, PCI 2002             PET Stress Scan 11/2012 - small perfusion defect                                                          In area of D2; < 1 % of myocardium           PET Stress Test 7/21/14: no clinically significant perfusion defects, no change             Cardiac Cath 12/2014 - patent coronary arteries    8. HTN              Losartan 50 mg/day, Lopressor 50 mg BID, Lasix 80 mg daily     9. Chronic Venous Insufficency             Lasix 80 mg daily           Venous Ultrasound 7/2013 - negative for DVT     10. Morbid Obesity (BMI-41)    11. SEAN              CPAP     12. Hyperlipidemia              Pravastatin 80 mg/day - discontinued b/c severe myalgia             Fish Oil -3 Omega FA 2 g/day     13. GERD              Nexium 40 mg/day; Zantac prn              EGD 4/2014: Gastric and duodenal Polyps - hyperplastic polyps                                    Bx: Chronic Active Gastritis; (-) H.pylori     14. Pancreatic Insufficiency with Chronic Diarrhea             Confirmed on EUS 2/23/15             Creon TID with meals    15. (B) Mild ICA Stenosis                Carotid US 7/2013 < 40 % stenosis; no change from 2012     16. Hx of Kidney Stones     17. BPH, ED, Peyronie's Disease               Tamsulosin; followed in Urology clinic     18. (B) Sensorineural Hearing Loss     19. Chronic Dizziness                c/w Diabetic Autonomic Neuropathy - Neurology (Dr. Maria)                (+) Tilt Test; (-) MRA Brain 5/2014              ENT Evaluation(Dr. Marquez): (-) VNG 9/2012                CT and MRI Brain: chronic  ischemic microvascular disease                Vascular Medicine (2/2013): Mild ICA stenosis                                     No evidence of VB insufficiency or Carotid Hypersensitivity     20. Allergic Rhinitis                Flonase Nasal Spray,  Allegra     21. DJD C/S, L/S, L-Hip              MRI C/S (10/2012): multilevel DJD; C5-6 Neuroforaminal Narrowing                Followed by Dr. Gaviria (Physical Medicine)              Norco  mg q 6 hrs, Gabapentin 600 mg QID    22. Chronic Anemia     23. Hx of Colonic Polyps             Colonoscopy 10/2012 - tubular adenoma; Repeat Colonoscopy 10/2017    24. Vitamin D Deficiency               Ergocalciferol 50,000 units 2 x a week    25. Depression, Anxiety, & Insomnia              Ambien 10 mg hs, Xanax     26. Glaucoma              Pataday Drops     27. Hx of Costochondritis (6/2013) - atypical chest pain - chest wall pain    28. Hepatic Steatosis                Elevated Transaminases; (-) Hepatitis Panel                Abdominal US c/w PARRISH     29. Hypothyroidism              Synthroid 25 mcg/day      Meds:  (Unsure of accuracy of this list as he did not bring in with him)           Albuterol Inhaler prn             Allegra           Amlodipine 5 mg/day - unclear if taking           Cholecalciferol 400 units daily           Co-enzyme Q-10 200 mg BID            Creon - 2 with meals            Eliquis (Apixaban) 5 mg BID           Ergocalciferol (ERGOCALCIFEROL) 50,000 units twice weekly             FLONASE 50 mcg/actuation nasal spray, BID                   Lasix 40 mg daily             Gabapentin 600 mg TID             Hydrocodone-acetaminophen (Norco)  mg q 6 h prn             Imdur 30 mg/day           Insulin (Humulin R) U-500 33-33-33-33           KCL 20 meq/day           Krill Oil/[place] 3 FA             Lasix 80 mg/day           Losartan 75 mg/day - unclear if taking           Metoprolol tartrate 100 mg BID - unclear if taking           Nexium 40  mg/day           NitroGLYCERIN (NITROSTAT) 0.4 MG SL prn             Pataday eye Drops             Synthroid 25 mcg/day           Tamsulosin (FLOMAX) 0.4 mg Cp24, daily           Triamcinolone (Kenalog) Cream 0.1%             Valium 2 mg - not taking           Xanax 0.5 mg prn - not taking           Zolpidem 10 mg hs prn       Allergies:           Cephalosporins         PCN           Sulfa     Surgical Hx:           Tonsillectomy           Cholecystectomy            Inguinal Hernia Repair            CABG - LAD & OM 2001            PCI Angioplasty 2002            Cardiac Ablation for AF - 8/2014          Hand Surgery            Eye Surgery            Left Knee Arthroscopic Sx - Meniscectomy    Social Hx:          Non-smoker          ETOH: none                    Retired Manager at [employer]     Recent Lab:          Annual PE: 5/3/16          CBC (5/3/16): 11.9/36.1          S. Ferritin (5/3/16): 188          TSH (4/2015): 2.875                    (4/30/6): 12.483                    (5/3/16): 5.838          Free T4 (5/3/16): 1.40          Free T3 (5/3/16): 2.00 (2.3-4.2)          Hgb A1c (4/2016): 9.2                         (10/2016): 10.8          Lipids (4/3/16): Chol-205, TG-248, HDL-29, LDL-126          BMP (10/2016): BUN-32, Cr-2.0, eGFR-31, Glu-380, K-5.3          Constipation   Associated symptoms include back pain and nausea. Pertinent negatives include no abdominal pain, diarrhea, fever or vomiting.   Nausea   Associated symptoms include nausea. Pertinent negatives include no abdominal pain, chest pain, chills, fever or vomiting.     Review of Systems   Constitutional: Positive for appetite change. Negative for chills and fever.   Respiratory: Negative for chest tightness and shortness of breath.    Cardiovascular: Negative for chest pain and leg swelling.   Gastrointestinal: Positive for constipation and nausea. Negative for abdominal pain, blood in stool, diarrhea and vomiting.   Genitourinary: Negative  for dysuria and hematuria.   Musculoskeletal: Positive for back pain.       Objective:      Physical Exam   Constitutional: He is oriented to person, place, and time. He appears well-developed and well-nourished.   Neck: No JVD present.   Cardiovascular: Normal rate and regular rhythm.  Exam reveals no gallop.    No murmur heard.  Pulses:       Dorsalis pedis pulses are 1+ on the right side, and 1+ on the left side.   Pulmonary/Chest: Effort normal and breath sounds normal. No respiratory distress. He has no wheezes. He has no rales.   Abdominal: Soft. He exhibits no distension and no mass. There is no tenderness.   Musculoskeletal: He exhibits no edema.   Feet:   Right Foot:   Protective Sensation: 5 sites tested. 3 sites sensed.   Left Foot:   Protective Sensation: 5 sites tested. 5 sites sensed.   Neurological: He is alert and oriented to person, place, and time.   Skin: Skin is warm and dry. He is not diaphoretic.   Psychiatric: He has a normal mood and affect.       Assessment:   1. Chronic Nausea x 2 months -  Hx of GERD and Diabetic Gastroparesis. POCT Glucose today is 132   2. Intermittent Constipation - aggravated by daily [place]. Needs TSH rechecked. Will add Metamucil  3. Poorly controlled Type 2 DM with Diabetic Retinopathy, Nephropathy, Peripheral Sensory Neuropathy  4. Hx of Autonomic Neuropathy with Hypotension & Syncopal Spells in past  5. HTN  6. Paroxysmal Atrial Fib, on Eliquis  7. CAD without Angina  8. HFpEF  9. Pancreatic Insufficiency  10. GERD  11. Morbid Obesity  12. SEAN - declines referral to Sleep clinic  13. Chronic Venous Insufficiency  14. 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 Vit D Deficiency  16. Hypothyroidism  17. Hx of Colon Polyps  18. Depression - declined antidepressants in past  19. AR  20. BPH, ED  21. Glaucoma  22. DJD Hip, C/S and L/S Spine  23. Chronic Opiate Use  24. Chronic Anemia  25. Hx of Kidney Stones    Plan:   1. CBC, CMP, Hgb A1c, TSH, free T4, Lipase, EGD   2. Metamucil daily,  Zofran q 8 hrs prn  3. D/C Nexium. Begin Protonix 40 mg BID  4. GI consult

## 2017-01-24 NOTE — MR AVS SNAPSHOT
[facility]  [address]  [facility] [address]  Phone: [phone]  Fax: [phone]                  Vinh Castro   2017 3:40 PM   Office Visit    Description:  Male : 1950   Provider:  Natalee Thapa MD   Department:  [facility]           Reason for Visit     Constipation     Nausea           Diagnoses this Visit        Comments    Nausea    -  Primary     Uncontrolled type 2 diabetes mellitus with diabetic neuropathy, with long-term current use of insulin         Gastroesophageal reflux disease, esophagitis presence not specified         SEAN (obstructive sleep apnea)         Hyperlipidemia, unspecified hyperlipidemia type         Steatosis of liver         CKD (chronic kidney disease) stage 3, GFR 30-59 ml/min         Diabetic peripheral neuropathy         Long term current use of anticoagulant therapy         Morbid obesity due to excess calories         Diabetic nephropathy associated with type 2 diabetes mellitus         Heart failure with preserved left ventricular function (HFpEF)         Paroxysmal atrial fibrillation         Coronary artery disease involving native coronary artery of native heart without angina pectoris         Essential hypertension         Acquired hypothyroidism         Belching                To Do List           Future Appointments        Provider Department Dept Phone    2017 8:00 AM HOME MONITOR DEVICE CHECK, [facility] - Arrhythmia [phone]    3/24/2017 2:30 PM LARRY [facility]aney [facility] - Endocrinology [phone]    2017 3:30 PM MD Pb Argueta [facility]-Physical Med & Rehab [phone]      To Schedule:     Please call the Endoscopy Department at [phone] to schedule your appointment.          Goals (5 Years of Data)     None      Follow-Up and Disposition     Return if symptoms worsen or fail to improve.       These Medications        Disp Refills Start End     pantoprazole (PROTONIX) 40 MG tablet 60 tablet 0 1/24/2017 2/7/2017    Take 1 tablet (40 mg total) by mouth 2 (two) times daily. - Oral    [id number] - [place] BASHIR - [address] AT [facility]  [place] Ph #: [phone]         [facility] On Call     [facility] On Call Nurse Care Line - 24/7 Assistance  Registered nurses in the [facility] On Call Center provide clinical advisement, health education, appointment booking, and other advisory services.  Call for this free service at [phone].             Medications           Message regarding Medications     Verify the changes and/or additions to your medication regime listed below are the same as discussed with your clinician today.  If any of these changes or additions are incorrect, please notify your healthcare provider.        START taking these NEW medications        Refills    pantoprazole (PROTONIX) 40 MG tablet 0    Sig: Take 1 tablet (40 mg total) by mouth 2 (two) times daily.    Class: Normal    Route: Oral      These medications were administered today        Dose Freq    ondansetron tablet 4 mg 4 mg Clinic/HOD 1 time    Sig: Take 1 tablet (4 mg total) by mouth one time.    Class: Normal    Route: Oral      STOP taking these medications     esomeprazole (NEXIUM) 40 MG capsule TAKE 1 CAPSULE BY MOUTH EVERY DAY           Verify that the below list of medications is an accurate representation of the medications you are currently taking.  If none reported, the list may be blank. If incorrect, please contact your healthcare provider. Carry this list with you in case of emergency.           Current Medications     CREON 24,000-76,000 -120,000 unit capsule TAKE 2 CAPSULES BY MOUTH THREE TIMES DAILY WITH MEALS, 2 WITH MEALS AND 1 WITH SNACK    ELIQUIS 5 mg Tab TAKE 1 TABLET BY MOUTH TWICE DAILY    ferrous sulfate 325 mg (65 mg iron) Tab tablet TAKE 1 TABLET BY MOUTH EVERY DAY    fluticasone (FLONASE) 50 mcg/actuation nasal spray  "SHAKE WELL AND USE 2 SPRAYS IN EACH NOSTRIL EVERY DAY    furosemide (LASIX) 80 MG tablet Take 80 mg by mouth once daily.    gabapentin (NEURONTIN) 600 MG tablet Take 1 tablet (600 mg total) by mouth 4 (four) times daily with meals and nightly.    guaifenesin (MUCINEX) 600 mg 12 hr tablet Take 600 mg by mouth continuous prn for Congestion. Prn      HUMULIN R U-500, CONCENTRATED, 500 unit/mL Soln INJECT INTO SKIN 32 UNIT JORDY ON REGULAR U-100 SYRINGE BEFORE MEALS AND WITH PM SNACK    hydrocodone-acetaminophen 10-325mg (NORCO)  mg Tab Take 1 tablet by mouth 4 (four) times daily.    hydrocodone-acetaminophen 10-325mg (NORCO)  mg Tab Starting on Feb 20, 2017. Take 1 tablet by mouth 4 (four) times daily.    hydrocodone-acetaminophen 10-325mg (NORCO)  mg Tab Starting on Mar 20, 2017. Take 1 tablet by mouth 4 (four) times daily.    insulin needles, disposable, (BD ULTRA-FINE REJI PEN NEEDLES) 32 x 5/32 " Ndle Uses 5 daily, on multiple daily insulin injections    insulin syringe-needle U-100 1/2 mL 30 Syrg     insulin syringe-needle U-100 1/2 mL 30 Syrg USE WITH INSULIN THREE TIMES DAILY TO FOUR TIMES DAILY    isosorbide mononitrate (IMDUR) 30 MG 24 hr tablet Take 1 tablet (30 mg total) by mouth every evening.    KRILL/OM3/DHA/EPA/OM6/LIP/ASTX (KRILL OIL, OMEGA 3 & 6, ORAL) Take 1 tablet by mouth every evening.     lancets (BD ULTRA FINE LANCETS) Misc 1 Units by Misc.(Non-Drug; Combo Route) route 4 (four) times daily.    levothyroxine (SYNTHROID) 25 MCG tablet Take 1 tablet (25 mcg total) by mouth once daily.    losartan (COZAAR) 50 MG tablet TAKE 1 TABLET BY MOUTH ONCE DAILY    metolazone (ZAROXOLYN) 5 MG tablet Take 1 tablet (5 mg total) by mouth daily as needed (Weight gain of 5 pounds or more).    metoprolol tartrate (LOPRESSOR) 50 MG tablet Take 1 tablet (50 mg total) by mouth 2 (two) times daily.    moxifloxacin (AVELOX) 400 mg tablet TK 1 T PO QD FOR 8 DAYS    MV-MN/FA/LYCOPENE/LUT/HB#178 (RAMEZ " MULTIVITAMIN FOR MEN ORAL) Take by mouth.    NEVANAC 0.1 % ophthalmic suspension     nitroGLYCERIN (NITROSTAT) 0.4 MG SL tablet Place 1 tablet (0.4 mg total) under the tongue every 5 (five) minutes as needed.    ofloxacin (FLOXIN) 0.3 % otic solution Place 3 drops into both ears 2 (two) times daily.     oxymetazoline (MUCINEX SINUS-MAX) 0.05 % nasal spray 2 sprays by Nasal route once daily.    PATADAY 0.2 % Drop Place 1 drop into both eyes once daily.     potassium chloride SA (K-DUR,KLOR-CON) 20 MEQ tablet TAKE 1 TABLET(20 MEQ) BY MOUTH EVERY DAY    tamsulosin (FLOMAX) 0.4 mg Cp24 TAKE 1 CAPSULE BY MOUTH EVERY DAY    triamcinolone (NASACORT) 55 mcg nasal inhaler 2 sprays by Nasal route once daily.    triamcinolone acetonide 0.1% (KENALOG) 0.1 % cream     VITAMIN D2 50,000 unit capsule TAKE 1 CAPSULE BY MOUTH 2 TIMES A WEEK    zolpidem (AMBIEN) 5 MG Tab Take 1 tablet (5 mg total) by mouth every evening.    alprazolam (XANAX) 0.5 MG tablet Take 0.5 mg by mouth daily as needed.     aspirin 81 MG Chew Take 81 mg by mouth once daily.    ASPIRIN/ACETAMINOPHEN/CAFFEINE (EXCEDRIN EXTRA STRENGTH ORAL) Take by mouth as needed.    blood sugar diagnostic (ACCU-CHEK JANEY PLUS TEST STRP) Strp Patient to check blood sugar 6 x's per day--Dx Code 250.62,  357.2    calcium carbonate-simethicone (MAALOX ADVANCED) 1,000-60 mg Chew Take 2 tablets by mouth 3 (three) times daily as needed.    cholecalciferol, vitamin D3, (VITAMIN D3) 5,000 unit Tab Take 5,000 Units by mouth. Takes one tablet every day except on Sun and Wed    ciprofloxacin HCl (CILOXAN) 0.3 % ophthalmic solution     DEXTROSE (GLUCOSE) Chew Take by mouth as needed.    diclofenac sodium (VOLTAREN) 1 % Gel Apply 2 g topically 4 (four) times daily. Apply to both hand    fexofenadine (ALLEGRA) 180 MG tablet Take 180 mg by mouth once daily.    fluocinolone-shower cap 0.01 % Oil     pantoprazole (PROTONIX) 40 MG tablet Take 1 tablet (40 mg total) by mouth 2 (two) times daily.  "          Clinical Reference Information           Vital Signs - Last Recorded  Most recent update: 1/24/2017  3:43 PM by Bella Hutson MA    BP Pulse Temp Ht Wt BMI    (!) 161/63 (BP Location: Left arm, Patient Position: Sitting, BP Method: Automatic) 65 99 °F (37.2 °C) (Oral) 5' 8" (1.727 m) 116.1 kg (255 lb 15.3 oz) 38.92 kg/m2      Blood Pressure          Most Recent Value    BP  (!)  161/63      Allergies as of 1/24/2017     Cephalexin    Penicillins    Pravastatin    Sulfa (Sulfonamide Antibiotics)      Immunizations Administered on Date of Encounter - 1/24/2017     None      Orders Placed During Today's Visit      Normal Orders This Visit    Ambulatory referral to Gastroenterology     Case request GI: ESOPHAGOGASTRODUODENOSCOPY (EGD)     POCT glucose     Future Labs/Procedures Expected by Expires    CBC auto differential  1/24/2017 3/25/2018    Comprehensive metabolic panel  1/24/2017 3/25/2018    Hemoglobin A1c  1/24/2017 3/25/2018    Lipase  1/24/2017 1/24/2018    T4, free  1/24/2017 1/24/2018    TSH  1/24/2017 1/24/2018 1/24/2017  4:17 PM - Bella Hutson MA      Component Results     Component Value Flag Ref Range Units Status    POC Glucose 132 (A) 70 - 110 mg/dL Final      "

## 2017-01-25 ENCOUNTER — TELEPHONE (OUTPATIENT)
Dept: CARDIOLOGY | Facility: CLINIC | Age: 67
End: 2017-01-25

## 2017-01-25 DIAGNOSIS — Z86.79 ATRIAL FIBRILLATION, CURRENTLY IN SINUS RHYTHM: ICD-10-CM

## 2017-01-25 LAB
ESTIMATED AVG GLUCOSE: 278 MG/DL
HBA1C MFR BLD HPLC: 11.3 %

## 2017-01-25 NOTE — TELEPHONE ENCOUNTER
ON CALL NOTE:    Panic Glucose 503    Discussed w/ pt the 503 value  He had just taken 35 U of his R U-500  And checked his BS w/ his glucometer and it was 400  He denied any sx that might suggest hyperglycemia- increased thirst, urination, or blurry vision    He reported that he forgot his lunch dose today    And has a protocol of checking his BS every 4 hours and repeating the reg U-500 dose if still elevated    He will monitor the BS tonight and hydrate w/ water and be cautious with his diet  And call prn tonight

## 2017-01-25 NOTE — TELEPHONE ENCOUNTER
----- Message from Charlee Kendall sent at 1/25/2017 10:21 AM CST -----  Contact: pt  Pt says he received a letter from [facility] stating that his Eliquis 5mg is not being covered. He would like a call at the number listed.     Thanks

## 2017-01-26 ENCOUNTER — TELEPHONE (OUTPATIENT)
Dept: ENDOSCOPY | Facility: HOSPITAL | Age: 67
End: 2017-01-26

## 2017-01-26 ENCOUNTER — OFFICE VISIT (OUTPATIENT)
Dept: GASTROENTEROLOGY | Facility: CLINIC | Age: 67
End: 2017-01-26
Payer: MEDICARE

## 2017-01-26 VITALS
SYSTOLIC BLOOD PRESSURE: 131 MMHG | DIASTOLIC BLOOD PRESSURE: 56 MMHG | BODY MASS INDEX: 39.79 KG/M2 | HEART RATE: 66 BPM | WEIGHT: 262.56 LBS | HEIGHT: 68 IN

## 2017-01-26 DIAGNOSIS — R73.9 HYPERGLYCEMIA: ICD-10-CM

## 2017-01-26 DIAGNOSIS — G47.33 OSA (OBSTRUCTIVE SLEEP APNEA): ICD-10-CM

## 2017-01-26 DIAGNOSIS — R11.0 NAUSEA: Primary | ICD-10-CM

## 2017-01-26 DIAGNOSIS — K86.89 PANCREATIC INSUFFICIENCY: ICD-10-CM

## 2017-01-26 DIAGNOSIS — E66.9 OBESITY (BMI 35.0-39.9 WITHOUT COMORBIDITY): ICD-10-CM

## 2017-01-26 DIAGNOSIS — I10 ESSENTIAL HYPERTENSION: ICD-10-CM

## 2017-01-26 DIAGNOSIS — I50.30 HEART FAILURE WITH PRESERVED LEFT VENTRICULAR FUNCTION (HFPEF): ICD-10-CM

## 2017-01-26 DIAGNOSIS — I48.0 PAROXYSMAL ATRIAL FIBRILLATION: ICD-10-CM

## 2017-01-26 DIAGNOSIS — K21.9 GASTROESOPHAGEAL REFLUX DISEASE WITHOUT ESOPHAGITIS: ICD-10-CM

## 2017-01-26 PROCEDURE — 99215 OFFICE O/P EST HI 40 MIN: CPT | Mod: PBBFAC | Performed by: PHYSICIAN ASSISTANT

## 2017-01-26 PROCEDURE — 99999 PR PBB SHADOW E&M-EST. PATIENT-LVL V: CPT | Mod: PBBFAC,,, | Performed by: PHYSICIAN ASSISTANT

## 2017-01-26 PROCEDURE — 99214 OFFICE O/P EST MOD 30 MIN: CPT | Mod: S$PBB,,, | Performed by: PHYSICIAN ASSISTANT

## 2017-01-26 RX ORDER — APIXABAN 5 MG/1
TABLET, FILM COATED ORAL
Qty: 60 TABLET | Refills: 0 | Status: SHIPPED | OUTPATIENT
Start: 2017-01-26 | End: 2017-03-10 | Stop reason: SDUPTHER

## 2017-01-26 NOTE — PATIENT INSTRUCTIONS
Take 3 creon tablets with a meal and 2 tablets with a snack    Advise blood sugar control     GASTROPARESIS DIET TIPS  Introduction  Gastroparesis means stomach (gastro) paralysis (paresis). The word gastroparesis is used when a patients stomach empties too slowly. Everyones stomach is unique, so the ability of the stomach to empty can vary from one patient to another. Most patients are able to swallow and empty their saliva (about 1 quart per day) and also empty the natural stomach juices they make (about 2-3 quarts per day). Symptoms can vary from week-to-week or even day-to-day. The guidelines presented here are designed to give tips for diet modification. In addition, lots of suggestions are provided for food and drinks. The suggestions are based on experience and not science, as there are no studies that have been done that demonstrate what foods are better tolerated than others by patients with gastroparesis. It is recommended that anyone with gastroparesis see a doctor and  Registered Dietitian to seek advice on how to maximize their nutritional status.    The Basics  Volume  The larger the meal, the slower the stomach will empty. It is important to decrease the amount of food eaten at a meal, but in order to meet nutrient needs, patients will have to eat more often. Smaller, more frequent meals (6-8 or more if necessary) may allow patients to meet their needs.  Liquids Versus Solids  If decreasing the meal size and increasing the number of meals does not work, the next step is to switch over to more liquid-type calories. Liquids are better tolerated than solids. Liquids empty the stomach more easily than solids do. Pureed foods may be better tolerated also.  Fiber  Fiber (found in many fruits, vegetables and grains) may act to slow stomach  emptying and fill the stomach up quickly, hence nutrient needs may not be met.  For patients who have had a bezoar (an indigestible, concretion of foods and/or  medications) in the past, a fiber restriction (including avoidance of over-the-counter fiber/bulking medicines) is worthwhile.  Fat  Although fat may slow stomach emptying in some patients, many can consume fat especially in the form of liquids. Although many clinicians restrict fat, my experience is that fat in the liquid form (as part of beverages such as whole milk, milkshakes, nutritional supplements, etc.) can be well tolerated by many. To take fat out of the diet of a patient diet that is seriously malnourished is to remove a valuable source of calories. Unless a fat-containing food or fluid causes problems, fat should not be limited. It is often well tolerated, pleasurable, and it provides a great source of calories small amounts.  Medications  There are quite a few medications that can delay stomach emptying -ask your doctor if any of the medications you are on could be slowing down your stomach emptying.  Getting Started  ·  Eat at least six small meals per day; avoid large meals.  ·  Avoid solid foods high in fat or adding too much fat (see list below) to foods, however, liquid beverages containing fat are often tolerated just fine.  ·  Eat nutritious foods first before filling up on empty calories (i.e., candy, cakes, pastries, etc.)  ·  Chew foods well; especially meats (meats may be more tolerated if ground or puréed.)  ·  Avoid high fiber foods because they may be more difficult for your stomach to  empty or may cause bezoar formation. A bezoar is a mixture of food fibers that may get stuck in the stomach causing it to not empty even more poorly.  ·  Sit up while eating and for at least 1 hour after finishing; consider taking a quiet walk after meals.  ·  If you have diabetes, keep your blood sugar under control. Let your doctor  know if your blood sugar runs >200 on a regular basis.  Try Blenderized Food  Any food can be blenderized, but solid foods will need to be thinned with some type of  liquid.  ·  Meats, fish, poultry and ham: Blend with broths, water, milk, vegetable or  V-8® juice, tomato sauce, gravies.  ·  Vegetables: Blend with water, tomato juice, broths, strained baby  vegetables.  ·  Starches: potatoes, pasta: Blend with soups, broth, milk, water, gravies;  add strained baby meats, etc to add protein if needed. Consider using hot  cereals such as cream of wheat or rice, grits, etc as your starch at lunch  and dinner.  ·  Fruits: Blend with their own juices, other fruit juices, water, strained baby  fruits.  ·  Cereals: Make with caloric beverage such as whole milk, soy or rice milk,  juice, Ensure® or equivalent, etc., instead of water. Add sugars, honey,  molasses, syrups, or other flavorings, butter or margarine for extra calories.  ·  Mixed dishes: Lasagna, macaroni and cheese, spaghetti, chili, stews, hearty  soups, chop suey - add adequate liquid of your choice, blend well and strain.  Always clean the  well. Any food left in the  for > 1-2 hours  could cause food poisoning. If you do not have a , strained baby foods will work and can be thinned down if needed with milk, soy or rice milk, water, broth, etc.    Getting your Calories  When getting enough calories is a daily struggle  ·  High calorie drinks are better than water (provides calories AND fluid); use  peach, pear or papaya nectar, cranberry juice, orange juice, Hawaiian Punch®, Hi C®, lemonade, Boaz-Aid®, etc.  ·  Fortify milk by adding dry milk powder - 1-cup powder to 1-quart milk.  ·  Use whole milk or evaporated milk (if tolerated) instead of skim or 2% for  drinking and preparing cream type soups, custards, puddings, and milkshakes.  ·  Add instant breakfast, protein powder, dry milk powder, or other flavored  powders or syrups to whole milk or juices.  ·  Make custards and puddings with eggs or egg substitutes (such as  Eggbeaters®).  ·  Try adding ice cream, sherbets, or sorbets to ready-made  liquid nutritional  supplements such as Nutra-shakes®, Ensure® or Boost® or others.  FOOD SUGGESTIONS FOR GASTROPARESIS  STARCHES  Breads  White bread (including French/Italian)  Bagels (plain or egg)  English muffin  Plain roll  Brooke bread  Tortilla (flour, corn)  Pancake  Waffle  Cereals  Quick oats (plain)  Grits  Cream of Wheat  Cream of Rice  Puffed wheat and rice cereals such as:  (Cheerios®, Sugar Pops®, Kix®, Rice Krispies®, Fruit Loops®, Special K®, Cocoa Crispies®, cornflakes, Cocoa Puffs®)  Grains/Potatoes  Rice (plain) - any Pasta, macaroni (plain)  Bulgur wheat  Barley  Potatoes (no skin, plain)  (all kinds-sweet, yams, etc.)  French fries (baked)  Crackers  Arrowroot  Breadsticks  Matzoh  [place] toast  Oyster  Pretzels  Saltines  Soda  Zwieback  MEATS - GROUND OR PUREED  Beef  Baby beef  Chipped beef  Flank steak  Tenderloin  Plate skirt steak  Round (bottom or top)  Rump  Veal  Leg  Loin  Rib  Shank  Shoulder  Pork  Lean pork  Tenderloin  Pork chops  97% fat-free ham  Poultry (skinless)  Chicken  Turkey (all)  Wild Game  Venison  Rabbit  Squirrel  Pheasant (no skin)  Fish/Shellfish  (fresh or frozen, plain, no breading)  Crab  Lobster  Shrimp  Clams  Scallops  Oysters  Tuna (in water)  Cheese  Cottage cheese  Grated Parmesan  Other  Eggs (no creamed or fried), egg white, egg substitute  Tofu  Strained baby meats (all)  VEGETABLES (Cooked, and if necessary, blenderized/strained)  Beets  Tomato sauce  Tomato juice  Tomato paste or Carrots   Strained baby  Mushrooms  Vegetable juice  puree vegetables  FRUITS AND JUICES (Cooked and, if necessary, blenderized/strained)  Fruits  Applesauce  Banana  Peaches (canned)  Pears (canned)  Strained baby fruits (all)  Juices (all)  Apple  Apple cider  Cranberry (sweetened)  Cranberry (low calorie)  Grape  Grapefruit  Rambo  Nectars (apricot, peach, pear)  Orange-grapefruit  Orange  Pineapple-orange  Papaya  Pineapple  Prune  MILK PRODUCTS  (if  tolerated)  OTHER  CARBOHYDRATES  SOUPS FAT (if tolerated)  Buttermilk  Yogurt (frozen)  Evaporated milk  Smooth yogurts  (without fruit pieces)  Milk powder  Milk - any as tolerated  Darryl food cake  Animal crackers  Custard/pudding  Gelatin/ Jell-O®  Natalie snaps  Bhupinder crackers  Popsicles  Plain sherbet  Vanilla wafers  Broth  Bouillon  Strained creamed  soups (with milk or water)  Cream cheese  Mayonnaise  Margarine  Butter  Vegetable oils  Smooth peanut  butter - small amounts  BEVERAGES SEASONINGS/GRAVIES SWEETS  Hot cocoa (made with water or milk)  Obaz-Aid®  Lemonade  Chau® and similar powdered  products  Gatorade® or Powerade®  Soft drinks  Coffee  Tea  Cranberry sauce (smooth  Fat-free gravies  Kinsey McButter®, Butter Buds®  Mustard  Ketchup  Vegetable oil spray  Soy sauce  Teriyaki sauce  Tabasco® sauce  Vanilla and other extracts  Vinegar  Gum  Gum drops  Hard candy  Jelly beans  Lemon drops  Rolled candy (such as  Lifesavers®)  Marshmallows  Seedless jams and jellies  The following foods have been associated with bezoar* formation; avoid if you  have been told you have had a bezoar.  Apples  Berries  Coconuts  Figs  Oranges  Persimmons  Bradley sprouts  Green beans  Legumes  Potato peels  Sauerkraut  *A Bezoar is a mixture of food residues that can accumulate in a stomach that does  not empty well.  ADDITIONAL RESOURCES  ¨  Gastroparesis & Dysmotilities Association: www.digestivedistress.com  o www.GInutrition.[place].edu o Scroll down to Articles in Practical         Gastroenterology  o August 2005 (article on gastroparesis)  ¨  Find more extensive diet suggestions for gastroparesis at www.[facility].com  o Click on Services à Digestive Health à Health and Prevention (in the  left-hand column) à scroll down to Stomach Paralysis - Gastroparesis Diet  Tips and Recipes

## 2017-01-26 NOTE — TELEPHONE ENCOUNTER
Patient is wanting to schedule an EGD. Is it okay to hold Eliquis 2 days prior to procedure? Please advise.    Thanks,  Sary

## 2017-01-26 NOTE — LETTER
January 26, 2017      Natalee Thapa MD  [address]  [place] [address]           [facility] - Gastroenterology  [address]  [place] [address]  Phone: [phone]  Fax: [phone]          Patient: Vinh Castro   MR Number: [id number]   YOB: 1950   Date of Visit: 1/26/2017       Dear Dr. Natalee Thapa:    Thank you for referring Vinh Castro to me for evaluation. Attached you will find relevant portions of my assessment and plan of care.    If you have questions, please do not hesitate to call me. I look forward to following Vinh Castro along with you.    Sincerely,    Kerwin Flower PA-C    Enclosure  CC:  No Recipients    If you would like to receive this communication electronically, please contact [email or web address] or [phone] to request more information on [employer] Link access.    For providers and/or their staff who would like to refer a patient to [facility], please contact us through our one-stop-shop provider referral line, [facility], at [phone].    If you feel you have received this communication in error or would no longer like to receive these types of communications, please e-mail [email or web address]

## 2017-01-26 NOTE — PROGRESS NOTES
[facility] Consultation Note    Reason for Consult:  The primary encounter diagnosis was Nausea. Diagnoses of Pancreatic insufficiency, Hyperglycemia, Gastroesophageal reflux disease without esophagitis, SEAN (obstructive sleep apnea), Essential hypertension, Heart failure with preserved left ventricular function (HFpEF), Obesity (BMI 35.0-39.9 without comorbidity), and Paroxysmal atrial fibrillation were also pertinent to this visit.    PCP:   Natalee Thapa   [address] [facility] / [place] [address]    Referring MD:  Natalee Thapa Md  [address] [facility] [place] [address]    HPI:  This is a 66 y.o. male with PMH pancreatic insufficiency and DM referred by Dr. Thapa for evaluation of nausea  Duration - 2 months  Having intermittent nausea, lasts for several hrs, he has not noticed a relation to  meals  Having epigastric soreness, moderate,   + bloating  +Burping and belching  + early satiety  Taking ASA 81mg without food   Also taking Norco 10mg QID for hip pain, says he would n ot be able to stop them for a GES  Take  1-2  Tablets of creon 4 times a day  S/p cholecystectomy  DM with intermittent Bs > 200. If he does not take his insulin BS can increase to 500.     Seen by his PCP for this issue who ordred an EGD to and increased his protonix to 40mg BID.  Has noticed some difference since starting the protonix 40mg BID    ROS:  Constitutional: No fevers, chills, No weight loss  ENT: No allergies  CV: No chest pain  Pulm: No cough, No shortness of breath  Ophtho: No vision changes  GI: see HPI  Derm: No rash  Heme: No lymphadenopathy, No bruising  MSK: hip pain  : No dysuria, No hematuria  Endo: No hot or cold intolerance  Neuro: No syncope, No seizure  Psych: No anxiety, No depression    Medical History:  has a past medical history of *Atrial fibrillation; Acquired hypothyroidism (5/4/2016); Allergy; Anticoagulant long-term use; Arthritis; CAD (coronary artery disease); CHF  (congestive heart failure); Chronic anticoagulation; Coronary artery disease; Depression; Diabetic nephropathy; Diabetic retinopathy associated with type 2 diabetes mellitus; Diverticulosis; Glaucoma; History of BPH; History of peripheral vascular disease; Hyperlipidemia; Hypertension; Obesity; SEAN (obstructive sleep apnea); Personal history of kidney stones; PN (peripheral neuropathy); Syncope (5/23/2016); and Type 2 diabetes mellitus not at goal.    Surgical History:  has a past surgical history that includes Tonsillectomy; Gallbladder surgery; Hernia repair; Cataract extraction bilateral w/ anterior vitrectomy; Hand surgery; Coronary artery bypass graft (2001); Retinal laser procedure; Radiofrequency ablation (8/14); Coronary angioplasty (2014); Eye surgery; Skin biopsy; Joint replacement; and Abdominal surgery.    Family History: family history includes Cancer in his father and mother; Diabetes in his father and paternal grandfather; Heart attack in his father; Heart disease in his father; Obesity in his sister. There is no history of Anesthesia problems..     Social History:  reports that he has never smoked. He has never used smokeless tobacco. He reports that he does not drink alcohol or use illicit drugs.    Review of patient's allergies indicates:   Allergen Reactions    Cephalexin Hives and Shortness Of Breath    Penicillins Other (See Comments)     Unknown  Hardened skin    Pravastatin Other (See Comments)     myalgia    Sulfa (sulfonamide antibiotics)      Other reaction(s): Unknown       Current Outpatient Prescriptions on File Prior to Visit   Medication Sig Dispense Refill    alprazolam (XANAX) 0.5 MG tablet Take 0.5 mg by mouth daily as needed.       aspirin 81 MG Chew Take 81 mg by mouth once daily.      ASPIRIN/ACETAMINOPHEN/CAFFEINE (EXCEDRIN EXTRA STRENGTH ORAL) Take by mouth as needed.      blood sugar diagnostic (ACCU-CHEK JANEY PLUS TEST STRP) Strp Patient to check blood sugar 6 x's  "per day--Dx Code 250.62,  357.2 600 each 1    calcium carbonate-simethicone (MAALOX ADVANCED) 1,000-60 mg Chew Take 2 tablets by mouth 3 (three) times daily as needed.      cholecalciferol, vitamin D3, (VITAMIN D3) 5,000 unit Tab Take 5,000 Units by mouth. Takes one tablet every day except on Sun and Wed      ciprofloxacin HCl (CILOXAN) 0.3 % ophthalmic solution       CREON 24,000-76,000 -120,000 unit capsule TAKE 2 CAPSULES BY MOUTH THREE TIMES DAILY WITH MEALS, 2 WITH MEALS AND 1 WITH SNACK 210 capsule 0    DEXTROSE (GLUCOSE) Chew Take by mouth as needed.      diclofenac sodium (VOLTAREN) 1 % Gel Apply 2 g topically 4 (four) times daily. Apply to both hand 3 Tube 2    ELIQUIS 5 mg Tab TAKE 1 TABLET BY MOUTH TWICE DAILY 60 tablet 0    ferrous sulfate 325 mg (65 mg iron) Tab tablet TAKE 1 TABLET BY MOUTH EVERY DAY 90 tablet 0    fexofenadine (ALLEGRA) 180 MG tablet Take 180 mg by mouth once daily.      fluocinolone-shower cap 0.01 % Oil       fluticasone (FLONASE) 50 mcg/actuation nasal spray SHAKE WELL AND USE 2 SPRAYS IN EACH NOSTRIL EVERY DAY 1 Bottle 5    furosemide (LASIX) 80 MG tablet Take 80 mg by mouth once daily.      guaifenesin (MUCINEX) 600 mg 12 hr tablet Take 600 mg by mouth continuous prn for Congestion. Prn        HUMULIN R U-500, CONCENTRATED, 500 unit/mL Soln INJECT INTO SKIN 32 UNIT JORDY ON REGULAR U-100 SYRINGE BEFORE MEALS AND WITH PM SNACK 20 mL 3    hydrocodone-acetaminophen 10-325mg (NORCO)  mg Tab Take 1 tablet by mouth 4 (four) times daily. 120 tablet 0    [START ON 2/20/2017] hydrocodone-acetaminophen 10-325mg (NORCO)  mg Tab Take 1 tablet by mouth 4 (four) times daily. 120 tablet 0    [START ON 3/20/2017] hydrocodone-acetaminophen 10-325mg (NORCO)  mg Tab Take 1 tablet by mouth 4 (four) times daily. 120 tablet 0    insulin needles, disposable, (BD ULTRA-FINE REJI PEN NEEDLES) 32 x 5/32 " Ndle Uses 5 daily, on multiple daily insulin injections 150 each 1 "    insulin syringe-needle U-100 1/2 mL 30 Syrg       insulin syringe-needle U-100 1/2 mL 30 Syrg USE WITH INSULIN THREE TIMES DAILY TO FOUR TIMES DAILY 400 each 2    isosorbide mononitrate (IMDUR) 30 MG 24 hr tablet Take 1 tablet (30 mg total) by mouth every evening. 30 tablet 11    KRILL/OM3/DHA/EPA/OM6/LIP/ASTX (KRILL OIL, OMEGA 3 & 6, ORAL) Take 1 tablet by mouth every evening.       lancets (BD ULTRA FINE LANCETS) Misc 1 Units by Misc.(Non-Drug; Combo Route) route 4 (four) times daily. 200 each 11    levothyroxine (SYNTHROID) 25 MCG tablet Take 1 tablet (25 mcg total) by mouth once daily. 30 tablet 11    losartan (COZAAR) 50 MG tablet TAKE 1 TABLET BY MOUTH ONCE DAILY 90 tablet 3    metolazone (ZAROXOLYN) 5 MG tablet Take 1 tablet (5 mg total) by mouth daily as needed (Weight gain of 5 pounds or more). 30 tablet 6    metoprolol tartrate (LOPRESSOR) 50 MG tablet Take 1 tablet (50 mg total) by mouth 2 (two) times daily. 60 tablet 11    moxifloxacin (AVELOX) 400 mg tablet TK 1 T PO QD FOR 8 DAYS  0    MV-MN/FA/LYCOPENE/LUT/HB#178 (RAMEZ MULTIVITAMIN FOR MEN ORAL) Take by mouth.      NEVANAC 0.1 % ophthalmic suspension       nitroGLYCERIN (NITROSTAT) 0.4 MG SL tablet Place 1 tablet (0.4 mg total) under the tongue every 5 (five) minutes as needed. 25 tablet 6    ofloxacin (FLOXIN) 0.3 % otic solution Place 3 drops into both ears 2 (two) times daily.       ondansetron (ZOFRAN) 4 MG tablet Take 1 tablet (4 mg total) by mouth every 8 (eight) hours as needed for Nausea. 30 tablet 0    oxymetazoline (MUCINEX SINUS-MAX) 0.05 % nasal spray 2 sprays by Nasal route once daily.      pantoprazole (PROTONIX) 40 MG tablet Take 1 tablet (40 mg total) by mouth 2 (two) times daily. 60 tablet 0    PATADAY 0.2 % Drop Place 1 drop into both eyes once daily.       potassium chloride SA (K-DUR,KLOR-CON) 20 MEQ tablet TAKE 1 TABLET(20 MEQ) BY MOUTH EVERY DAY 30 tablet 9    tamsulosin (FLOMAX) 0.4 mg Cp24 TAKE 1 CAPSULE  "BY MOUTH EVERY DAY 90 capsule 5    triamcinolone (NASACORT) 55 mcg nasal inhaler 2 sprays by Nasal route once daily.      triamcinolone acetonide 0.1% (KENALOG) 0.1 % cream       VITAMIN D2 50,000 unit capsule TAKE 1 CAPSULE BY MOUTH 2 TIMES A WEEK 24 capsule 2    zolpidem (AMBIEN) 5 MG Tab Take 1 tablet (5 mg total) by mouth every evening. 30 tablet 0    gabapentin (NEURONTIN) 600 MG tablet Take 1 tablet (600 mg total) by mouth 4 (four) times daily with meals and nightly. 360 tablet 2     No current facility-administered medications on file prior to visit.          Objective Findings:    Vital Signs:  Visit Vitals    BP (!) 131/56    Pulse 66    Ht 5' 8" (1.727 m)    Wt 119.1 kg (262 lb 9.1 oz)    BMI 39.92 kg/m2     Body mass index is 39.92 kg/(m^2).    Physical Exam:  General Appearance: Well appearing in no acute distress   Head:   Normocephalic, without obvious abnormality  Eyes:    No scleral icterus  ENT: Neck supple, Lips, mucosa, and tongue normal  Lungs: CTA bilaterally in anterior and posterior fields, no wheezes, no crackles.  Heart:  Regular rate and rhythm, S1, S2 normal, no murmurs heard  Abdomen: Soft, mild diffuse tenderness, particularly in the epigastric and LUQ, non-distended with positive bowel sounds in all four quadrants.   Extremities: 2+ pulses, no edema  Skin: No rash  Neurologic: AAO x 3      Labs:  Lab Results   Component Value Date    WBC 8.78 01/24/2017    HGB 12.4 (L) 01/24/2017    HCT 36.6 (L) 01/24/2017     01/24/2017    CHOL 205 (H) 04/30/2016    TRIG 248 (H) 04/30/2016    HDL 29 (L) 04/30/2016     (H) 01/24/2017     (H) 01/24/2017     (L) 01/24/2017    K 5.6 (H) 01/24/2017    CL 94 (L) 01/24/2017    CREATININE 2.0 (H) 01/24/2017    BUN 36 (H) 01/24/2017    CO2 24 01/24/2017    TSH 0.724 01/24/2017    PSA 0.17 07/10/2013    INR 1.1 05/23/2016    HGBA1C 11.3 (H) 01/24/2017       Imaging:    Endoscopy:    2/2015 EUS - pancreatic parechyma " abnormalities, pancreaus divisum.  2014 EGD - gastric and duodenal polyps, esophagitis    Assessment:  1. Nausea    2. Pancreatic insufficiency    3. Hyperglycemia    4. Gastroesophageal reflux disease without esophagitis    5. SEAN (obstructive sleep apnea)    6. Essential hypertension    7. Heart failure with preserved left ventricular function (HFpEF)    8. Obesity (BMI 35.0-39.9 without comorbidity)    9. Paroxysmal atrial fibrillation    ASA 81mg use    Nausea x 2 months. A number of elements are likely contributing to his nausea which is include possible gastroparesis from DM and narcotics, pancreatic insufficiency, and possible gastric erosions.       Recommendations:  1. Schedule EGD ordered by his PCP to rule out Peralta's esophagus and gastric and duodenal ulcers; Bx for H. pylori    2. Increase dose of creon to 3 tablets with a meal and 2 with a snack. The dose he in currently taking is too low based on his weight    3. Gastroparesis diet. Will not order GES since he says she can not stop his Norco for 24hrs     Return in about 2 months (around 3/26/2017).      Order summary:         Thank you so much for allowing me to participate in the care of Vinh Flower PA-C

## 2017-01-26 NOTE — MR AVS SNAPSHOT
[facility] - Gastroenterology  [address]  [facility] [address]  Phone: [phone]  Fax: [phone]                  Vinh Castro   2017 8:30 AM   Office Visit    Description:  Male : 1950   Provider:  Kerwin Flower PA-C   Department:  [facility] - Gastroenterology           Reason for Visit     GI Problem           Diagnoses this Visit        Comments    Pancreatic insufficiency    -  Primary     Hyperglycemia                To Do List           Future Appointments        Provider Department Dept Phone    2017 8:00 AM HOME MONITOR DEVICE CHECK, [facility] - Arrhythmia [phone]    3/24/2017 2:30 PM LARRY Chaney [facility] - Endocrinology [phone]    2017 3:30 PM MD [facility]-Physical Med & Rehab [phone]      Goals (5 Years of Data)     None      [facility]     [facility] -  Assistance  Registered nurses in the [facility] provide clinical advisement, health education, appointment booking, and other advisory services.  Call for this free service at [phone].             Medications           Message regarding Medications     Verify the changes and/or additions to your medication regime listed below are the same as discussed with your clinician today.  If any of these changes or additions are incorrect, please notify your healthcare provider.             Verify that the below list of medications is an accurate representation of the medications you are currently taking.  If none reported, the list may be blank. If incorrect, please contact your healthcare provider. Carry this list with you in case of emergency.           Current Medications     alprazolam (XANAX) 0.5 MG tablet Take 0.5 mg by mouth daily as needed.     aspirin 81 MG Chew Take 81 mg by mouth once daily.    ASPIRIN/ACETAMINOPHEN/CAFFEINE (EXCEDRIN EXTRA STRENGTH ORAL) Take by mouth as needed.    blood sugar diagnostic  "(ACCU-CHEK JANEY PLUS TEST STRP) Strp Patient to check blood sugar 6 x's per day--Dx Code 250.62,  357.2    calcium carbonate-simethicone (MAALOX ADVANCED) 1,000-60 mg Chew Take 2 tablets by mouth 3 (three) times daily as needed.    cholecalciferol, vitamin D3, (VITAMIN D3) 5,000 unit Tab Take 5,000 Units by mouth. Takes one tablet every day except on Sun and Wed    ciprofloxacin HCl (CILOXAN) 0.3 % ophthalmic solution     CREON 24,000-76,000 -120,000 unit capsule TAKE 2 CAPSULES BY MOUTH THREE TIMES DAILY WITH MEALS, 2 WITH MEALS AND 1 WITH SNACK    DEXTROSE (GLUCOSE) Chew Take by mouth as needed.    diclofenac sodium (VOLTAREN) 1 % Gel Apply 2 g topically 4 (four) times daily. Apply to both hand    ELIQUIS 5 mg Tab TAKE 1 TABLET BY MOUTH TWICE DAILY    ferrous sulfate 325 mg (65 mg iron) Tab tablet TAKE 1 TABLET BY MOUTH EVERY DAY    fexofenadine (ALLEGRA) 180 MG tablet Take 180 mg by mouth once daily.    fluocinolone-shower cap 0.01 % Oil     fluticasone (FLONASE) 50 mcg/actuation nasal spray SHAKE WELL AND USE 2 SPRAYS IN EACH NOSTRIL EVERY DAY    furosemide (LASIX) 80 MG tablet Take 80 mg by mouth once daily.    guaifenesin (MUCINEX) 600 mg 12 hr tablet Take 600 mg by mouth continuous prn for Congestion. Prn      HUMULIN R U-500, CONCENTRATED, 500 unit/mL Soln INJECT INTO SKIN 32 UNIT JORDY ON REGULAR U-100 SYRINGE BEFORE MEALS AND WITH PM SNACK    hydrocodone-acetaminophen 10-325mg (NORCO)  mg Tab Take 1 tablet by mouth 4 (four) times daily.    hydrocodone-acetaminophen 10-325mg (NORCO)  mg Tab Starting on Feb 20, 2017. Take 1 tablet by mouth 4 (four) times daily.    hydrocodone-acetaminophen 10-325mg (NORCO)  mg Tab Starting on Mar 20, 2017. Take 1 tablet by mouth 4 (four) times daily.    insulin needles, disposable, (BD ULTRA-FINE REJI PEN NEEDLES) 32 x 5/32 " Ndle Uses 5 daily, on multiple daily insulin injections    insulin syringe-needle U-100 1/2 mL 30 Syrg     insulin syringe-needle " U-100 1/2 mL 30 Syrg USE WITH INSULIN THREE TIMES DAILY TO FOUR TIMES DAILY    isosorbide mononitrate (IMDUR) 30 MG 24 hr tablet Take 1 tablet (30 mg total) by mouth every evening.    KRILL/OM3/DHA/EPA/OM6/LIP/ASTX (KRILL OIL, OMEGA 3 & 6, ORAL) Take 1 tablet by mouth every evening.     lancets (BD ULTRA FINE LANCETS) Misc 1 Units by Misc.(Non-Drug; Combo Route) route 4 (four) times daily.    levothyroxine (SYNTHROID) 25 MCG tablet Take 1 tablet (25 mcg total) by mouth once daily.    losartan (COZAAR) 50 MG tablet TAKE 1 TABLET BY MOUTH ONCE DAILY    metolazone (ZAROXOLYN) 5 MG tablet Take 1 tablet (5 mg total) by mouth daily as needed (Weight gain of 5 pounds or more).    metoprolol tartrate (LOPRESSOR) 50 MG tablet Take 1 tablet (50 mg total) by mouth 2 (two) times daily.    moxifloxacin (AVELOX) 400 mg tablet TK 1 T PO QD FOR 8 DAYS    MV-MN/FA/LYCOPENE/LUT/HB#178 (RAMEZ MULTIVITAMIN FOR MEN ORAL) Take by mouth.    NEVANAC 0.1 % ophthalmic suspension     nitroGLYCERIN (NITROSTAT) 0.4 MG SL tablet Place 1 tablet (0.4 mg total) under the tongue every 5 (five) minutes as needed.    ofloxacin (FLOXIN) 0.3 % otic solution Place 3 drops into both ears 2 (two) times daily.     ondansetron (ZOFRAN) 4 MG tablet Take 1 tablet (4 mg total) by mouth every 8 (eight) hours as needed for Nausea.    oxymetazoline (MUCINEX SINUS-MAX) 0.05 % nasal spray 2 sprays by Nasal route once daily.    pantoprazole (PROTONIX) 40 MG tablet Take 1 tablet (40 mg total) by mouth 2 (two) times daily.    PATADAY 0.2 % Drop Place 1 drop into both eyes once daily.     potassium chloride SA (K-DUR,KLOR-CON) 20 MEQ tablet TAKE 1 TABLET(20 MEQ) BY MOUTH EVERY DAY    tamsulosin (FLOMAX) 0.4 mg Cp24 TAKE 1 CAPSULE BY MOUTH EVERY DAY    triamcinolone (NASACORT) 55 mcg nasal inhaler 2 sprays by Nasal route once daily.    triamcinolone acetonide 0.1% (KENALOG) 0.1 % cream     VITAMIN D2 50,000 unit capsule TAKE 1 CAPSULE BY MOUTH 2 TIMES A WEEK    zolpidem  "(AMBIEN) 5 MG Tab Take 1 tablet (5 mg total) by mouth every evening.    gabapentin (NEURONTIN) 600 MG tablet Take 1 tablet (600 mg total) by mouth 4 (four) times daily with meals and nightly.           Clinical Reference Information           Vital Signs - Last Recorded  Most recent update: 1/26/2017  9:04 AM by Margoth Pedroza MA    BP Pulse Ht Wt BMI    (!) 131/56 66 5' 8" (1.727 m) 119.1 kg (262 lb 9.1 oz) 39.92 kg/m2      Blood Pressure          Most Recent Value    BP  (!)  131/56      Allergies as of 1/26/2017     Cephalexin    Penicillins    Pravastatin    Sulfa (Sulfonamide Antibiotics)      Immunizations Administered on Date of Encounter - 1/26/2017     None      Instructions    Take 3 creon tablets with a meal and 2 tablets with a snack    Advise blood sugar control     GASTROPARESIS DIET TIPS  Introduction  Gastroparesis means stomach (gastro) paralysis (paresis). The word gastroparesis is used when a patients stomach empties too slowly. Everyones stomach is unique, so the ability of the stomach to empty can vary from one patient to another. Most patients are able to swallow and empty their saliva (about 1 quart per day) and also empty the natural stomach juices they make (about 2-3 quarts per day). Symptoms can vary from week-to-week or even day-to-day. The guidelines presented here are designed to give tips for diet modification. In addition, lots of suggestions are provided for food and drinks. The suggestions are based on experience and not science, as there are no studies that have been done that demonstrate what foods are better tolerated than others by patients with gastroparesis. It is recommended that anyone with gastroparesis see a doctor and  Registered Dietitian to seek advice on how to maximize their nutritional status.    The Basics  Volume  The larger the meal, the slower the stomach will empty. It is important to decrease the amount of food eaten at a meal, but in order to meet " nutrient needs, patients will have to eat more often. Smaller, more frequent meals (6-8 or more if necessary) may allow patients to meet their needs.  Liquids Versus Solids  If decreasing the meal size and increasing the number of meals does not work, the next step is to switch over to more liquid-type calories. Liquids are better tolerated than solids. Liquids empty the stomach more easily than solids do. Pureed foods may be better tolerated also.  Fiber  Fiber (found in many fruits, vegetables and grains) may act to slow stomach  emptying and fill the stomach up quickly, hence nutrient needs may not be met.  For patients who have had a bezoar (an indigestible, concretion of foods and/or medications) in the past, a fiber restriction (including avoidance of over-the-counter fiber/bulking medicines) is worthwhile.  Fat  Although fat may slow stomach emptying in some patients, many can consume fat especially in the form of liquids. Although many clinicians restrict fat, my experience is that fat in the liquid form (as part of beverages such as whole milk, milkshakes, nutritional supplements, etc.) can be well tolerated by many. To take fat out of the diet of a patient diet that is seriously malnourished is to remove a valuable source of calories. Unless a fat-containing food or fluid causes problems, fat should not be limited. It is often well tolerated, pleasurable, and it provides a great source of calories small amounts.  Medications  There are quite a few medications that can delay stomach emptying -ask your doctor if any of the medications you are on could be slowing down your stomach emptying.  Getting Started  ·  Eat at least six small meals per day; avoid large meals.  ·  Avoid solid foods high in fat or adding too much fat (see list below) to foods, however, liquid beverages containing fat are often tolerated just fine.  ·  Eat nutritious foods first before filling up on empty calories (i.e., candy,  cakes, pastries, etc.)  ·  Chew foods well; especially meats (meats may be more tolerated if ground or puréed.)  ·  Avoid high fiber foods because they may be more difficult for your stomach to  empty or may cause bezoar formation. A bezoar is a mixture of food fibers that may get stuck in the stomach causing it to not empty even more poorly.  ·  Sit up while eating and for at least 1 hour after finishing; consider taking a quiet walk after meals.  ·  If you have diabetes, keep your blood sugar under control. Let your doctor  know if your blood sugar runs >200 on a regular basis.  Try Blenderized Food  Any food can be blenderized, but solid foods will need to be thinned with some type of liquid.  ·  Meats, fish, poultry and ham: Blend with broths, water, milk, vegetable or  V-8® juice, tomato sauce, gravies.  ·  Vegetables: Blend with water, tomato juice, broths, strained baby  vegetables.  ·  Starches: potatoes, pasta: Blend with soups, broth, milk, water, gravies;  add strained baby meats, etc to add protein if needed. Consider using hot  cereals such as cream of wheat or rice, grits, etc as your starch at lunch  and dinner.  ·  Fruits: Blend with their own juices, other fruit juices, water, strained baby  fruits.  ·  Cereals: Make with caloric beverage such as whole milk, soy or rice milk,  juice, Ensure® or equivalent, etc., instead of water. Add sugars, honey,  molasses, syrups, or other flavorings, butter or margarine for extra calories.  ·  Mixed dishes: Lasagna, macaroni and cheese, spaghetti, chili, stews, hearty  soups, chop suey - add adequate liquid of your choice, blend well and strain.  Always clean the  well. Any food left in the  for > 1-2 hours  could cause food poisoning. If you do not have a , strained baby foods will work and can be thinned down if needed with milk, soy or rice milk, water, broth, etc.    Getting your Calories  When getting enough calories is a daily  struggle  ·  High calorie drinks are better than water (provides calories AND fluid); use  peach, pear or papaya nectar, cranberry juice, orange juice, Hawaiian Punch®, Hi C®, lemonade, Boaz-Aid®, etc.  ·  Fortify milk by adding dry milk powder - 1-cup powder to 1-quart milk.  ·  Use whole milk or evaporated milk (if tolerated) instead of skim or 2% for  drinking and preparing cream type soups, custards, puddings, and milkshakes.  ·  Add instant breakfast, protein powder, dry milk powder, or other flavored  powders or syrups to whole milk or juices.  ·  Make custards and puddings with eggs or egg substitutes (such as  Eggbeaters®).  ·  Try adding ice cream, sherbets, or sorbets to ready-made liquid nutritional  supplements such as Nutra-shakes®, Ensure® or Boost® or others.  FOOD SUGGESTIONS FOR GASTROPARESIS  STARCHES  Breads  White bread (including French/Italian)  Bagels (plain or egg)  English muffin  Plain roll  Brooke bread  Tortilla (flour, corn)  Pancake  Waffle  Cereals  Quick oats (plain)  Grits  Cream of Wheat  Cream of Rice  Puffed wheat and rice cereals such as:  (Cheerios®, Sugar Pops®, Kix®, Rice Krispies®, Fruit Loops®, Special K®, Cocoa Crispies®, cornflakes, Cocoa Puffs®)  Grains/Potatoes  Rice (plain) - any Pasta, macaroni (plain)  Bulgur wheat  Barley  Potatoes (no skin, plain)  (all kinds-sweet, yams, etc.)  French fries (baked)  Crackers  Arrowroot  Breadsticks  Matzoh  Greeley toast  Oyster  Pretzels  Saltines  Soda  Zwieback  MEATS - GROUND OR PUREED  Beef  Baby beef  Chipped beef  Flank steak  Tenderloin  Plate skirt steak  Round (bottom or top)  Rump  Veal  Leg  Loin  Rib  Shank  Shoulder  Pork  Lean pork  Tenderloin  Pork chops  97% fat-free ham  Poultry (skinless)  Chicken  Turkey (all)  Wild Game  Venison  Rabbit  Squirrel  Pheasant (no skin)  Fish/Shellfish  (fresh or frozen, plain, no breading)  Crab  Lobster  Shrimp  Clams  Scallops  Oysters  Tuna (in water)  Cheese  Cottage  cheese  Grated Parmesan  Other  Eggs (no creamed or fried), egg white, egg substitute  Tofu  Strained baby meats (all)  VEGETABLES (Cooked, and if necessary, blenderized/strained)  Beets  Tomato sauce  Tomato juice  Tomato paste or Carrots   Strained baby  Mushrooms  Vegetable juice  puree vegetables  FRUITS AND JUICES (Cooked and, if necessary, blenderized/strained)  Fruits  Applesauce  Banana  Peaches (canned)  Pears (canned)  Strained baby fruits (all)  Juices (all)  Apple  Apple cider  Cranberry (sweetened)  Cranberry (low calorie)  Grape  Grapefruit  Rambo  Nectars (apricot, peach, pear)  Orange-grapefruit  Orange  Pineapple-orange  Papaya  Pineapple  Prune  MILK PRODUCTS  (if tolerated)  OTHER  CARBOHYDRATES  SOUPS FAT (if tolerated)  Buttermilk  Yogurt (frozen)  Evaporated milk  Smooth yogurts  (without fruit pieces)  Milk powder  Milk - any as tolerated  Darryl food cake  Animal crackers  Custard/pudding  Gelatin/ Jell-O®  Natalie snaps  Bhupinder crackers  Popsicles  Plain sherbet  Vanilla wafers  Broth  Bouillon  Strained creamed  soups (with milk or water)  Cream cheese  Mayonnaise  Margarine  Butter  Vegetable oils  Smooth peanut  butter - small amounts  BEVERAGES SEASONINGS/GRAVIES SWEETS  Hot cocoa (made with water or milk)  Boaz-Aid®  Lemonade  Chau® and similar powdered  products  Gatorade® or Powerade®  Soft drinks  Coffee  Tea  Cranberry sauce (smooth  Fat-free gravies  Kinsey McButter®, Butter Buds®  Mustard  Ketchup  Vegetable oil spray  Soy sauce  Teriyaki sauce  Tabasco® sauce  Vanilla and other extracts  Vinegar  Gum  Gum drops  Hard candy  Jelly beans  Lemon drops  Rolled candy (such as  Lifesavers®)  Marshmallows  Seedless jams and jellies  The following foods have been associated with bezoar* formation; avoid if you  have been told you have had a bezoar.  Apples  Berries  Coconuts  Figs  Oranges  Persimmons  [place] sprouts  Green beans  Legumes  Potato peels  Sauerkraut  *A Bezoar is a mixture  of food residues that can accumulate in a stomach that does  not empty well.  ADDITIONAL RESOURCES  ¨  Gastroparesis & Dysmotilities Association: www.digestivedistress.com  o www.GInutrition.[facility] o Scroll down to Articles in Practical         Gastroenterology  o August 2005 (article on gastroparesis)  ¨  Find more extensive diet suggestions for gastroparesis at www.Spireon.com  o Click on Services à Digestive Health à Health and Prevention (in the  left-hand column) à scroll down to Stomach Paralysis - Gastroparesis Diet  Tips and Recipes

## 2017-02-01 ENCOUNTER — TELEPHONE (OUTPATIENT)
Dept: INTERNAL MEDICINE | Facility: CLINIC | Age: 67
End: 2017-02-01

## 2017-02-01 ENCOUNTER — TELEPHONE (OUTPATIENT)
Dept: CARDIOLOGY | Facility: CLINIC | Age: 67
End: 2017-02-01

## 2017-02-01 NOTE — TELEPHONE ENCOUNTER
Called pt back pt states that he recently switched coverage to [employer] and they sent him a letter stating that they would not cover his eliquis due to a problem in prescribing physician. i called Viktoria PA and put in the request. eliquis was approved until December 31, 2017. They stated that they will be sending over the approval paperwork within the hour. Called pt back to inform him that the medication was approved and once they send over the PA approval i would call in the medication for him. Pt verbalized understanding.

## 2017-02-01 NOTE — TELEPHONE ENCOUNTER
----- Message from Na Don sent at 2/1/2017  1:59 PM CST -----  Contact: pt called  Pt called, states he will like to follow up with you in regards to his RX eliquis.Ph for pt is [phone]. Pt of Dr. Christine and LOV 10/11/16. Thank you

## 2017-02-01 NOTE — TELEPHONE ENCOUNTER
----- Message from Conrado Davis sent at 2/1/2017  3:57 PM CST -----  Contact: self/ [phone] cell  Pt would like a call back to discuss his medication ELIQUIS 5 mg Tab.  He states that it's some confusion going on about who originally prescribed the medication and his insurance is denying it until more information is given.  He would like a call back today, if possible.  Please call and advise.    Thank you

## 2017-02-02 ENCOUNTER — TELEPHONE (OUTPATIENT)
Dept: PHARMACY | Facility: CLINIC | Age: 67
End: 2017-02-02

## 2017-02-02 ENCOUNTER — TELEPHONE (OUTPATIENT)
Dept: ELECTROPHYSIOLOGY | Facility: CLINIC | Age: 67
End: 2017-02-02

## 2017-02-02 NOTE — TELEPHONE ENCOUNTER
Nurse called and spoke with MR Castro. Informed him that Ms Phyllis Escobar ([phone]) sent information for patient assistance to [employer] and should hear back soon. Ms Escobar will contact Mr Castro next week to tell him where we stand. Patient states he has enough medication to last 10-14 days and he just found out from [employer] that they will cover Eliquis but copay will be high. Informed patient that we will help any way we can. Patient acknowledged and thanked nurse.  ----- Message from Kristel Weiss MA sent at 2/2/2017  4:08 PM CST -----  Contact: pt      ----- Message -----     From: Charlee Kendall     Sent: 2/1/2017   3:55 PM       To: Laver[facility] Staff    Pt says he recently switched to [employer] and they won't cover his Eliquis 5 mg with approval bt his doctors office. He would like a call soon because he is running low.    Thanks

## 2017-02-03 DIAGNOSIS — K86.81 EXOCRINE PANCREATIC INSUFFICIENCY: ICD-10-CM

## 2017-02-03 RX ORDER — PANCRELIPASE 24000; 76000; 120000 [USP'U]/1; [USP'U]/1; [USP'U]/1
CAPSULE, DELAYED RELEASE PELLETS ORAL
Qty: 210 CAPSULE | Refills: 0 | Status: SHIPPED | OUTPATIENT
Start: 2017-02-03 | End: 2017-03-03 | Stop reason: SDUPTHER

## 2017-02-06 ENCOUNTER — TELEPHONE (OUTPATIENT)
Dept: GASTROENTEROLOGY | Facility: CLINIC | Age: 67
End: 2017-02-06

## 2017-02-06 ENCOUNTER — ANESTHESIA EVENT (OUTPATIENT)
Dept: ENDOSCOPY | Facility: HOSPITAL | Age: 67
End: 2017-02-06
Payer: MEDICARE

## 2017-02-06 ENCOUNTER — ANESTHESIA (OUTPATIENT)
Dept: ENDOSCOPY | Facility: HOSPITAL | Age: 67
End: 2017-02-06
Payer: MEDICARE

## 2017-02-06 ENCOUNTER — SURGERY (OUTPATIENT)
Age: 67
End: 2017-02-06

## 2017-02-06 ENCOUNTER — HOSPITAL ENCOUNTER (OUTPATIENT)
Facility: HOSPITAL | Age: 67
Discharge: HOME | End: 2017-02-06
Attending: INTERNAL MEDICINE | Admitting: INTERNAL MEDICINE
Payer: MEDICARE

## 2017-02-06 VITALS
RESPIRATION RATE: 18 BRPM | TEMPERATURE: 98 F | OXYGEN SATURATION: 94 % | HEART RATE: 68 BPM | DIASTOLIC BLOOD PRESSURE: 58 MMHG | SYSTOLIC BLOOD PRESSURE: 125 MMHG | BODY MASS INDEX: 36.98 KG/M2 | HEIGHT: 68 IN | WEIGHT: 244 LBS

## 2017-02-06 VITALS — RESPIRATION RATE: 15 BRPM

## 2017-02-06 DIAGNOSIS — K21.9 GERD (GASTROESOPHAGEAL REFLUX DISEASE): ICD-10-CM

## 2017-02-06 DIAGNOSIS — K21.9 GASTROESOPHAGEAL REFLUX DISEASE, ESOPHAGITIS PRESENCE NOT SPECIFIED: Primary | ICD-10-CM

## 2017-02-06 DIAGNOSIS — R79.89 ELEVATED LFTS: ICD-10-CM

## 2017-02-06 LAB — POCT GLUCOSE: 260 MG/DL (ref 70–110)

## 2017-02-06 PROCEDURE — 88305 TISSUE EXAM BY PATHOLOGIST: CPT | Performed by: PATHOLOGY

## 2017-02-06 PROCEDURE — 27201012 HC FORCEPS, HOT/COLD, DISP: Performed by: INTERNAL MEDICINE

## 2017-02-06 PROCEDURE — 88305 TISSUE EXAM BY PATHOLOGIST: CPT | Mod: 26,,, | Performed by: PATHOLOGY

## 2017-02-06 PROCEDURE — 25000003 PHARM REV CODE 250: Performed by: INTERNAL MEDICINE

## 2017-02-06 PROCEDURE — 63600175 PHARM REV CODE 636 W HCPCS: Performed by: REGISTERED NURSE

## 2017-02-06 PROCEDURE — 25000003 PHARM REV CODE 250: Performed by: REGISTERED NURSE

## 2017-02-06 PROCEDURE — 82962 GLUCOSE BLOOD TEST: CPT | Performed by: INTERNAL MEDICINE

## 2017-02-06 PROCEDURE — 37000008 HC ANESTHESIA 1ST 15 MINUTES: Performed by: INTERNAL MEDICINE

## 2017-02-06 PROCEDURE — D9220A PRA ANESTHESIA: Mod: CRNA,,, | Performed by: REGISTERED NURSE

## 2017-02-06 PROCEDURE — 43239 EGD BIOPSY SINGLE/MULTIPLE: CPT | Performed by: INTERNAL MEDICINE

## 2017-02-06 PROCEDURE — 43235 EGD DIAGNOSTIC BRUSH WASH: CPT | Mod: ,,, | Performed by: INTERNAL MEDICINE

## 2017-02-06 PROCEDURE — 37000009 HC ANESTHESIA EA ADD 15 MINS: Performed by: INTERNAL MEDICINE

## 2017-02-06 PROCEDURE — D9220A PRA ANESTHESIA: Mod: ANES,,, | Performed by: ANESTHESIOLOGY

## 2017-02-06 RX ORDER — SODIUM CHLORIDE 9 MG/ML
INJECTION, SOLUTION INTRAVENOUS CONTINUOUS
Status: DISCONTINUED | OUTPATIENT
Start: 2017-02-06 | End: 2017-02-06 | Stop reason: HOSPADM

## 2017-02-06 RX ORDER — PROPOFOL 10 MG/ML
VIAL (ML) INTRAVENOUS
Status: DISCONTINUED | OUTPATIENT
Start: 2017-02-06 | End: 2017-02-06

## 2017-02-06 RX ORDER — FENTANYL CITRATE 50 UG/ML
INJECTION, SOLUTION INTRAMUSCULAR; INTRAVENOUS
Status: DISCONTINUED | OUTPATIENT
Start: 2017-02-06 | End: 2017-02-06

## 2017-02-06 RX ORDER — GLYCOPYRROLATE 0.2 MG/ML
INJECTION INTRAMUSCULAR; INTRAVENOUS
Status: DISCONTINUED | OUTPATIENT
Start: 2017-02-06 | End: 2017-02-06

## 2017-02-06 RX ORDER — PROPOFOL 10 MG/ML
VIAL (ML) INTRAVENOUS CONTINUOUS PRN
Status: DISCONTINUED | OUTPATIENT
Start: 2017-02-06 | End: 2017-02-06

## 2017-02-06 RX ADMIN — FENTANYL CITRATE 25 MCG: 50 INJECTION, SOLUTION INTRAMUSCULAR; INTRAVENOUS at 09:02

## 2017-02-06 RX ADMIN — PROPOFOL 20 MG: 10 INJECTION, EMULSION INTRAVENOUS at 09:02

## 2017-02-06 RX ADMIN — FENTANYL CITRATE 50 MCG: 50 INJECTION, SOLUTION INTRAMUSCULAR; INTRAVENOUS at 09:02

## 2017-02-06 RX ADMIN — PROPOFOL 100 MCG/KG/MIN: 10 INJECTION, EMULSION INTRAVENOUS at 09:02

## 2017-02-06 RX ADMIN — GLYCOPYRROLATE 0.2 MG: 0.2 INJECTION, SOLUTION INTRAMUSCULAR; INTRAVENOUS at 09:02

## 2017-02-06 RX ADMIN — SODIUM CHLORIDE: 0.9 INJECTION, SOLUTION INTRAVENOUS at 09:02

## 2017-02-06 NOTE — H&P (VIEW-ONLY)
[facility] Consultation Note    Reason for Consult:  The primary encounter diagnosis was Nausea. Diagnoses of Pancreatic insufficiency, Hyperglycemia, Gastroesophageal reflux disease without esophagitis, SEAN (obstructive sleep apnea), Essential hypertension, Heart failure with preserved left ventricular function (HFpEF), Obesity (BMI 35.0-39.9 without comorbidity), and Paroxysmal atrial fibrillation were also pertinent to this visit.    PCP:   Natalee Thapa   [address] [facility] / [place] [address]    Referring MD:  Natalee Thapa Md  [address] [facility] [place] [address]    HPI:  This is a 66 y.o. male with PMH pancreatic insufficiency and DM referred by Dr. Thapa for evaluation of nausea  Duration - 2 months  Having intermittent nausea, lasts for several hrs, he has not noticed a relation to  meals  Having epigastric soreness, moderate,   + bloating  +Burping and belching  + early satiety  Taking ASA 81mg without food   Also taking Norco 10mg QID for hip pain, says he would n ot be able to stop them for a GES  Take  1-2  Tablets of creon 4 times a day  S/p cholecystectomy  DM with intermittent Bs > 200. If he does not take his insulin BS can increase to 500.     Seen by his PCP for this issue who ordred an EGD to and increased his protonix to 40mg BID.  Has noticed some difference since starting the protonix 40mg BID    ROS:  Constitutional: No fevers, chills, No weight loss  ENT: No allergies  CV: No chest pain  Pulm: No cough, No shortness of breath  Ophtho: No vision changes  GI: see HPI  Derm: No rash  Heme: No lymphadenopathy, No bruising  MSK: hip pain  : No dysuria, No hematuria  Endo: No hot or cold intolerance  Neuro: No syncope, No seizure  Psych: No anxiety, No depression    Medical History:  has a past medical history of *Atrial fibrillation; Acquired hypothyroidism (5/4/2016); Allergy; Anticoagulant long-term use; Arthritis; CAD (coronary artery disease); CHF  (congestive heart failure); Chronic anticoagulation; Coronary artery disease; Depression; Diabetic nephropathy; Diabetic retinopathy associated with type 2 diabetes mellitus; Diverticulosis; Glaucoma; History of BPH; History of peripheral vascular disease; Hyperlipidemia; Hypertension; Obesity; SEAN (obstructive sleep apnea); Personal history of kidney stones; PN (peripheral neuropathy); Syncope (5/23/2016); and Type 2 diabetes mellitus not at goal.    Surgical History:  has a past surgical history that includes Tonsillectomy; Gallbladder surgery; Hernia repair; Cataract extraction bilateral w/ anterior vitrectomy; Hand surgery; Coronary artery bypass graft (2001); Retinal laser procedure; Radiofrequency ablation (8/14); Coronary angioplasty (2014); Eye surgery; Skin biopsy; Joint replacement; and Abdominal surgery.    Family History: family history includes Cancer in his father and mother; Diabetes in his father and paternal grandfather; Heart attack in his father; Heart disease in his father; Obesity in his sister. There is no history of Anesthesia problems..     Social History:  reports that he has never smoked. He has never used smokeless tobacco. He reports that he does not drink alcohol or use illicit drugs.    Review of patient's allergies indicates:   Allergen Reactions    Cephalexin Hives and Shortness Of Breath    Penicillins Other (See Comments)     Unknown  Hardened skin    Pravastatin Other (See Comments)     myalgia    Sulfa (sulfonamide antibiotics)      Other reaction(s): Unknown       Current Outpatient Prescriptions on File Prior to Visit   Medication Sig Dispense Refill    alprazolam (XANAX) 0.5 MG tablet Take 0.5 mg by mouth daily as needed.       aspirin 81 MG Chew Take 81 mg by mouth once daily.      ASPIRIN/ACETAMINOPHEN/CAFFEINE (EXCEDRIN EXTRA STRENGTH ORAL) Take by mouth as needed.      blood sugar diagnostic (ACCU-CHEK JANEY PLUS TEST STRP) Strp Patient to check blood sugar 6 x's  "per day--Dx Code 250.62,  357.2 600 each 1    calcium carbonate-simethicone (MAALOX ADVANCED) 1,000-60 mg Chew Take 2 tablets by mouth 3 (three) times daily as needed.      cholecalciferol, vitamin D3, (VITAMIN D3) 5,000 unit Tab Take 5,000 Units by mouth. Takes one tablet every day except on Sun and Wed      ciprofloxacin HCl (CILOXAN) 0.3 % ophthalmic solution       CREON 24,000-76,000 -120,000 unit capsule TAKE 2 CAPSULES BY MOUTH THREE TIMES DAILY WITH MEALS, 2 WITH MEALS AND 1 WITH SNACK 210 capsule 0    DEXTROSE (GLUCOSE) Chew Take by mouth as needed.      diclofenac sodium (VOLTAREN) 1 % Gel Apply 2 g topically 4 (four) times daily. Apply to both hand 3 Tube 2    ELIQUIS 5 mg Tab TAKE 1 TABLET BY MOUTH TWICE DAILY 60 tablet 0    ferrous sulfate 325 mg (65 mg iron) Tab tablet TAKE 1 TABLET BY MOUTH EVERY DAY 90 tablet 0    fexofenadine (ALLEGRA) 180 MG tablet Take 180 mg by mouth once daily.      fluocinolone-shower cap 0.01 % Oil       fluticasone (FLONASE) 50 mcg/actuation nasal spray SHAKE WELL AND USE 2 SPRAYS IN EACH NOSTRIL EVERY DAY 1 Bottle 5    furosemide (LASIX) 80 MG tablet Take 80 mg by mouth once daily.      guaifenesin (MUCINEX) 600 mg 12 hr tablet Take 600 mg by mouth continuous prn for Congestion. Prn        HUMULIN R U-500, CONCENTRATED, 500 unit/mL Soln INJECT INTO SKIN 32 UNIT JORDY ON REGULAR U-100 SYRINGE BEFORE MEALS AND WITH PM SNACK 20 mL 3    hydrocodone-acetaminophen 10-325mg (NORCO)  mg Tab Take 1 tablet by mouth 4 (four) times daily. 120 tablet 0    [START ON 2/20/2017] hydrocodone-acetaminophen 10-325mg (NORCO)  mg Tab Take 1 tablet by mouth 4 (four) times daily. 120 tablet 0    [START ON 3/20/2017] hydrocodone-acetaminophen 10-325mg (NORCO)  mg Tab Take 1 tablet by mouth 4 (four) times daily. 120 tablet 0    insulin needles, disposable, (BD ULTRA-FINE REJI PEN NEEDLES) 32 x 5/32 " Ndle Uses 5 daily, on multiple daily insulin injections 150 each 1 "    insulin syringe-needle U-100 1/2 mL 30 Syrg       insulin syringe-needle U-100 1/2 mL 30 Syrg USE WITH INSULIN THREE TIMES DAILY TO FOUR TIMES DAILY 400 each 2    isosorbide mononitrate (IMDUR) 30 MG 24 hr tablet Take 1 tablet (30 mg total) by mouth every evening. 30 tablet 11    KRILL/OM3/DHA/EPA/OM6/LIP/ASTX (KRILL OIL, OMEGA 3 & 6, ORAL) Take 1 tablet by mouth every evening.       lancets (BD ULTRA FINE LANCETS) Misc 1 Units by Misc.(Non-Drug; Combo Route) route 4 (four) times daily. 200 each 11    levothyroxine (SYNTHROID) 25 MCG tablet Take 1 tablet (25 mcg total) by mouth once daily. 30 tablet 11    losartan (COZAAR) 50 MG tablet TAKE 1 TABLET BY MOUTH ONCE DAILY 90 tablet 3    metolazone (ZAROXOLYN) 5 MG tablet Take 1 tablet (5 mg total) by mouth daily as needed (Weight gain of 5 pounds or more). 30 tablet 6    metoprolol tartrate (LOPRESSOR) 50 MG tablet Take 1 tablet (50 mg total) by mouth 2 (two) times daily. 60 tablet 11    moxifloxacin (AVELOX) 400 mg tablet TK 1 T PO QD FOR 8 DAYS  0    MV-MN/FA/LYCOPENE/LUT/HB#178 (RAMEZ MULTIVITAMIN FOR MEN ORAL) Take by mouth.      NEVANAC 0.1 % ophthalmic suspension       nitroGLYCERIN (NITROSTAT) 0.4 MG SL tablet Place 1 tablet (0.4 mg total) under the tongue every 5 (five) minutes as needed. 25 tablet 6    ofloxacin (FLOXIN) 0.3 % otic solution Place 3 drops into both ears 2 (two) times daily.       ondansetron (ZOFRAN) 4 MG tablet Take 1 tablet (4 mg total) by mouth every 8 (eight) hours as needed for Nausea. 30 tablet 0    oxymetazoline (MUCINEX SINUS-MAX) 0.05 % nasal spray 2 sprays by Nasal route once daily.      pantoprazole (PROTONIX) 40 MG tablet Take 1 tablet (40 mg total) by mouth 2 (two) times daily. 60 tablet 0    PATADAY 0.2 % Drop Place 1 drop into both eyes once daily.       potassium chloride SA (K-DUR,KLOR-CON) 20 MEQ tablet TAKE 1 TABLET(20 MEQ) BY MOUTH EVERY DAY 30 tablet 9    tamsulosin (FLOMAX) 0.4 mg Cp24 TAKE 1 CAPSULE  "BY MOUTH EVERY DAY 90 capsule 5    triamcinolone (NASACORT) 55 mcg nasal inhaler 2 sprays by Nasal route once daily.      triamcinolone acetonide 0.1% (KENALOG) 0.1 % cream       VITAMIN D2 50,000 unit capsule TAKE 1 CAPSULE BY MOUTH 2 TIMES A WEEK 24 capsule 2    zolpidem (AMBIEN) 5 MG Tab Take 1 tablet (5 mg total) by mouth every evening. 30 tablet 0    gabapentin (NEURONTIN) 600 MG tablet Take 1 tablet (600 mg total) by mouth 4 (four) times daily with meals and nightly. 360 tablet 2     No current facility-administered medications on file prior to visit.          Objective Findings:    Vital Signs:  Visit Vitals    BP (!) 131/56    Pulse 66    Ht 5' 8" (1.727 m)    Wt 119.1 kg (262 lb 9.1 oz)    BMI 39.92 kg/m2     Body mass index is 39.92 kg/(m^2).    Physical Exam:  General Appearance: Well appearing in no acute distress   Head:   Normocephalic, without obvious abnormality  Eyes:    No scleral icterus  ENT: Neck supple, Lips, mucosa, and tongue normal  Lungs: CTA bilaterally in anterior and posterior fields, no wheezes, no crackles.  Heart:  Regular rate and rhythm, S1, S2 normal, no murmurs heard  Abdomen: Soft, mild diffuse tenderness, particularly in the epigastric and LUQ, non-distended with positive bowel sounds in all four quadrants.   Extremities: 2+ pulses, no edema  Skin: No rash  Neurologic: AAO x 3      Labs:  Lab Results   Component Value Date    WBC 8.78 01/24/2017    HGB 12.4 (L) 01/24/2017    HCT 36.6 (L) 01/24/2017     01/24/2017    CHOL 205 (H) 04/30/2016    TRIG 248 (H) 04/30/2016    HDL 29 (L) 04/30/2016     (H) 01/24/2017     (H) 01/24/2017     (L) 01/24/2017    K 5.6 (H) 01/24/2017    CL 94 (L) 01/24/2017    CREATININE 2.0 (H) 01/24/2017    BUN 36 (H) 01/24/2017    CO2 24 01/24/2017    TSH 0.724 01/24/2017    PSA 0.17 07/10/2013    INR 1.1 05/23/2016    HGBA1C 11.3 (H) 01/24/2017       Imaging:    Endoscopy:    2/2015 EUS - pancreatic parechyma " abnormalities, pancreaus divisum.  2014 EGD - gastric and duodenal polyps, esophagitis    Assessment:  1. Nausea    2. Pancreatic insufficiency    3. Hyperglycemia    4. Gastroesophageal reflux disease without esophagitis    5. SEAN (obstructive sleep apnea)    6. Essential hypertension    7. Heart failure with preserved left ventricular function (HFpEF)    8. Obesity (BMI 35.0-39.9 without comorbidity)    9. Paroxysmal atrial fibrillation    ASA 81mg use    Nausea x 2 months. A number of elements are likely contributing to his nausea which is include possible gastroparesis from DM and narcotics, pancreatic insufficiency, and possible gastric erosions.       Recommendations:  1. Schedule EGD ordered by his PCP to rule out Peralta's esophagus and gastric and duodenal ulcers; Bx for H. pylori    2. Increase dose of creon to 3 tablets with a meal and 2 with a snack. The dose he in currently taking is too low based on his weight    3. Gastroparesis diet. Will not order GES since he says she can not stop his Norco for 24hrs     Return in about 2 months (around 3/26/2017).      Order summary:         Thank you so much for allowing me to participate in the care of Vinh lFower PA-C

## 2017-02-06 NOTE — INTERVAL H&P NOTE
Pre-Procedure H and P Addendum    Patient seen and examined.  History and exam unchanged from prior history and physical.      Procedure:  EGD  Indication: GERD, nausea  ASA Class: per anesthesiology  Airway: normal  Neck Mobility: full range of motion  Mallampatti score: per anesthesia  History of anesthesia problems: no  Family history of anesthesia problems: no  Anesthesia Plan: MAC    Anesthesia/Surgery risks, benefits and alternative options discussed and understood by patient/family.          Active Hospital Problems    Diagnosis  POA    GERD (gastroesophageal reflux disease) [K21.9]  Yes      Resolved Hospital Problems    Diagnosis Date Resolved POA   No resolved problems to display.

## 2017-02-06 NOTE — TELEPHONE ENCOUNTER
----- Message from Shimon Salazar MD sent at 2/6/2017 10:11 AM CST -----  Regarding: hepatology referral  Please help to schedule Hepatology clinic appointment for elevated LFTs and suspected portal hypertension.  Order placed.

## 2017-02-06 NOTE — ANESTHESIA POSTPROCEDURE EVALUATION
"Anesthesia Post Evaluation    Patient: Vinh Castro    Procedure(s) Performed: Procedure(s) (LRB):  ESOPHAGOGASTRODUODENOSCOPY (EGD) (N/A)    Final Anesthesia Type: general  Patient location during evaluation: PACU  Patient participation: Yes- Able to Participate  Level of consciousness: awake and alert and oriented  Post-procedure vital signs: reviewed and stable  Pain management: adequate  Airway patency: patent  PONV status at discharge: No PONV  Anesthetic complications: no      Cardiovascular status: hemodynamically stable  Respiratory status: unassisted  Hydration status: euvolemic  Follow-up not needed.        Visit Vitals    /68    Pulse 70    Temp 36.7 °C (98 °F) (Oral)    Resp 18    Ht 5' 8" (1.727 m)    Wt 110.7 kg (244 lb)    SpO2 (!) 94%    BMI 37.1 kg/m2       Pain/Devante Score: Pain Assessment Performed: Yes (2/6/2017  9:55 AM)  Presence of Pain: denies (2/6/2017  9:55 AM)  Devante Score: 10 (2/6/2017  9:55 AM)      "

## 2017-02-06 NOTE — TRANSFER OF CARE
"Anesthesia Transfer of Care Note    Patient: Vinh Castro    Procedure(s) Performed: Procedure(s) (LRB):  ESOPHAGOGASTRODUODENOSCOPY (EGD) (N/A)    Patient location: GI    Anesthesia Type: general    Transport from OR: Transported from OR on room air with adequate spontaneous ventilation    Post pain: adequate analgesia    Post assessment: no apparent anesthetic complications and tolerated procedure well    Post vital signs: stable    Level of consciousness: awake, alert and oriented    Nausea/Vomiting: no nausea/vomiting    Complications: none          Last vitals:   Visit Vitals    BP (!) 149/65 (Patient Position: Lying, BP Method: Automatic)    Pulse 73    Temp 36.7 °C (98.1 °F) (Oral)    Resp 16    Ht 5' 8" (1.727 m)    Wt 110.7 kg (244 lb)    SpO2 95%    BMI 37.1 kg/m2     "

## 2017-02-06 NOTE — IP AVS SNAPSHOT
[facility]  [address]  [facility] [address]  Phone: [phone]           Patient Discharge Instructions     Our goal is to set you up for success. This packet includes information on your condition, medications, and your home care. It will help you to care for yourself so you don't get sicker and need to go back to the hospital.     Please ask your nurse if you have any questions.        There are many details to remember when preparing to leave the hospital. Here is what you will need to do:    1. Take your medicine. If you are prescribed medications, review your Medication List in the following pages. You may have new medications to  at the pharmacy and others that you'll need to stop taking. Review the instructions for how and when to take your medications. Talk with your doctor or nurses if you are unsure of what to do.     2. Go to your follow-up appointments. Specific follow-up information is listed in the following pages. Your may be contacted by a transition nurse or clinical provider about future appointments. Be sure we have all of the phone numbers to reach you, if needed. Please contact your provider's office if you are unable to make an appointment.     3. Watch for warning signs. Your doctor or nurse will give you detailed warning signs to watch for and when to call for assistance. These instructions may also include educational information about your condition. If you experience any of warning signs to your health, call your doctor.               [facility]  Unless otherwise directed by your provider, please contact [facility], our nurse care line that is available for 24/7 assistance.     [phone] (toll-free)    Registered nurses in the [facility] provide clinical advisement, health education, appointment booking, and other advisory services.                    ** Verify the list of medication(s) below is accurate and up  to date. Carry this with you in case of emergency. If your medications have changed, please notify your healthcare provider.             Medication List      CONTINUE taking these medications        Additional Info                      alprazolam 0.5 MG tablet   Commonly known as:  XANAX   Refills:  0   Dose:  0.5 mg    Instructions:  Take 0.5 mg by mouth daily as needed.     Begin Date    AM    Noon    PM    Bedtime       aspirin 81 MG Chew   Refills:  0   Dose:  81 mg    Instructions:  Take 81 mg by mouth once daily.     Begin Date    AM    Noon    PM    Bedtime       blood sugar diagnostic Strp   Commonly known as:  ACCU-CHEK JANEY PLUS TEST STRP   Quantity:  600 each   Refills:  1    Instructions:  Patient to check blood sugar 6 x's per day--Dx Code 250.62,  357.2     Begin Date    AM    Noon    PM    Bedtime       ciprofloxacin HCl 0.3 % ophthalmic solution   Commonly known as:  CILOXAN   Refills:  0      Begin Date    AM    Noon    PM    Bedtime       CREON 24,000-76,000 -120,000 unit capsule   Quantity:  210 capsule   Refills:  0   Generic drug:  lipase-protease-amylase 24,000-76,000-120,000 units    Instructions:  TAKE 2 CAPSULES BY MOUTH THREE TIMES DAILY WITH MEALS, 2 WITH MEALS AND 1 WITH SNACK     Begin Date    AM    Noon    PM    Bedtime       diclofenac sodium 1 % Gel   Commonly known as:  VOLTAREN   Quantity:  3 Tube   Refills:  2   Dose:  2 g    Instructions:  Apply 2 g topically 4 (four) times daily. Apply to both hand     Begin Date    AM    Noon    PM    Bedtime       ELIQUIS 5 mg Tab   Quantity:  60 tablet   Refills:  0   Generic drug:  apixaban    Instructions:  TAKE 1 TABLET BY MOUTH TWICE DAILY     Begin Date    AM    Noon    PM    Bedtime       EXCEDRIN EXTRA STRENGTH ORAL   Refills:  0    Instructions:  Take by mouth as needed.     Begin Date    AM    Noon    PM    Bedtime       ferrous sulfate 325 mg (65 mg iron) Tab tablet   Quantity:  90 tablet   Refills:  0    Instructions:  TAKE 1  TABLET BY MOUTH EVERY DAY     Begin Date    AM    Noon    PM    Bedtime       fexofenadine 180 MG tablet   Commonly known as:  ALLEGRA   Refills:  0   Dose:  180 mg    Instructions:  Take 180 mg by mouth once daily.     Begin Date    AM    Noon    PM    Bedtime       fluocinolone and shower cap 0.01 % Oil   Refills:  0      Begin Date    AM    Noon    PM    Bedtime       fluticasone 50 mcg/actuation nasal spray   Commonly known as:  FLONASE   Quantity:  1 Bottle   Refills:  5    Instructions:  SHAKE WELL AND USE 2 SPRAYS IN EACH NOSTRIL EVERY DAY     Begin Date    AM    Noon    PM    Bedtime       furosemide 80 MG tablet   Commonly known as:  LASIX   Refills:  0   Dose:  80 mg    Instructions:  Take 80 mg by mouth once daily.     Begin Date    AM    Noon    PM    Bedtime       gabapentin 600 MG tablet   Commonly known as:  NEURONTIN   Quantity:  360 tablet   Refills:  2   Dose:  600 mg    Instructions:  Take 1 tablet (600 mg total) by mouth 4 (four) times daily with meals and nightly.     Begin Date    AM    Noon    PM    Bedtime       glucose Chew   Refills:  0    Instructions:  Take by mouth as needed.     Begin Date    AM    Noon    PM    Bedtime       guaifenesin 600 mg 12 hr tablet   Commonly known as:  MUCINEX   Refills:  0   Dose:  600 mg    Instructions:  Take 600 mg by mouth continuous prn for Congestion. Prn     Begin Date    AM    Noon    PM    Bedtime       HUMULIN R U-500 (CONCENTRATED) 500 unit/mL Soln   Quantity:  20 mL   Refills:  3   Generic drug:   insulin regular U-500 concentrated    Instructions:  INJECT INTO SKIN 32 UNIT JORDY ON REGULAR U-100 SYRINGE BEFORE MEALS AND WITH PM SNACK     Begin Date    AM    Noon    PM    Bedtime       * hydrocodone-acetaminophen 10-325mg  mg Tab   Commonly known as:  NORCO   Quantity:  120 tablet   Refills:  0   Dose:  1 tablet    Instructions:  Take 1 tablet by mouth 4 (four) times daily.     Begin Date    AM    Noon    PM    Bedtime       *  hydrocodone-acetaminophen 10-325mg  mg Tab   Commonly known as:  NORCO   Quantity:  120 tablet   Refills:  0   Dose:  1 tablet    Start Date:  2/20/2017   Instructions:  Take 1 tablet by mouth 4 (four) times daily.     Begin Date    AM    Noon    PM    Bedtime       * hydrocodone-acetaminophen 10-325mg  mg Tab   Commonly known as:  NORCO   Quantity:  120 tablet   Refills:  0   Dose:  1 tablet    Start Date:  3/20/2017   Instructions:  Take 1 tablet by mouth 4 (four) times daily.     Begin Date    AM    Noon    PM    Bedtime       * insulin syringe-needle U-100 1/2 mL 30 gauge Syrg   Refills:  0      Begin Date    AM    Noon    PM    Bedtime       * insulin syringe-needle U-100 1/2 mL 30 gauge Syrg   Quantity:  400 each   Refills:  2    Instructions:  USE WITH INSULIN THREE TIMES DAILY TO FOUR TIMES DAILY     Begin Date    AM    Noon    PM    Bedtime       isosorbide mononitrate 30 MG 24 hr tablet   Commonly known as:  IMDUR   Quantity:  30 tablet   Refills:  11   Dose:  30 mg    Instructions:  Take 1 tablet (30 mg total) by mouth every evening.     Begin Date    AM    Noon    PM    Bedtime       KRILL OIL (OMEGA 3 & 6) ORAL   Refills:  0   Dose:  1 tablet    Instructions:  Take 1 tablet by mouth every evening.     Begin Date    AM    Noon    PM    Bedtime       lancets Misc   Commonly known as:  BD ULTRA FINE LANCETS   Quantity:  200 each   Refills:  11   Dose:  1 Units    Instructions:  1 Units by Misc.(Non-Drug; Combo Route) route 4 (four) times daily.     Begin Date    AM    Noon    PM    Bedtime       levothyroxine 25 MCG tablet   Commonly known as:  SYNTHROID   Quantity:  30 tablet   Refills:  11   Dose:  25 mcg    Instructions:  Take 1 tablet (25 mcg total) by mouth once daily.     Begin Date    AM    Noon    PM    Bedtime       losartan 50 MG tablet   Commonly known as:  COZAAR   Quantity:  90 tablet   Refills:  3    Instructions:  TAKE 1 TABLET BY MOUTH ONCE DAILY     Begin Date    AM    Noon     PM    Bedtime       MAALOX ADVANCED 1,000-60 mg Chew   Refills:  0   Dose:  2 tablet   Generic drug:  calcium carbonate-simethicone    Instructions:  Take 2 tablets by mouth 3 (three) times daily as needed.     Begin Date    AM    Noon    PM    Bedtime       RAMEZ MULTIVITAMIN FOR MEN ORAL   Refills:  0    Instructions:  Take by mouth.     Begin Date    AM    Noon    PM    Bedtime       metOLazone 5 MG tablet   Commonly known as:  ZAROXOLYN   Quantity:  30 tablet   Refills:  6   Dose:  5 mg    Instructions:  Take 1 tablet (5 mg total) by mouth daily as needed (Weight gain of 5 pounds or more).     Begin Date    AM    Noon    PM    Bedtime       metoprolol tartrate 50 MG tablet   Commonly known as:  LOPRESSOR   Quantity:  60 tablet   Refills:  11   Dose:  50 mg    Instructions:  Take 1 tablet (50 mg total) by mouth 2 (two) times daily.     Begin Date    AM    Noon    PM    Bedtime       moxifloxacin 400 mg tablet   Commonly known as:  AVELOX   Refills:  0    Instructions:  TK 1 T PO QD FOR 8 DAYS     Begin Date    AM    Noon    PM    Bedtime       MUCINEX SINUS-MAX 0.05 % nasal spray   Refills:  0   Dose:  2 spray   Generic drug:  oxymetazoline    Instructions:  2 sprays by Nasal route once daily.     Begin Date    AM    Noon    PM    Bedtime       NEVANAC 0.1 % ophthalmic suspension   Refills:  0   Generic drug:  nepafenac      Begin Date    AM    Noon    PM    Bedtime       nitroGLYCERIN 0.4 MG SL tablet   Commonly known as:  NITROSTAT   Quantity:  25 tablet   Refills:  6   Dose:  0.4 mg    Instructions:  Place 1 tablet (0.4 mg total) under the tongue every 5 (five) minutes as needed.     Begin Date    AM    Noon    PM    Bedtime       ofloxacin 0.3 % otic solution   Commonly known as:  FLOXIN   Refills:  0   Dose:  3 drop    Instructions:  Place 3 drops into both ears 2 (two) times daily.     Begin Date    AM    Noon    PM    Bedtime       ondansetron 4 MG tablet   Commonly known as:  ZOFRAN   Quantity:  30 tablet  "  Refills:  0   Dose:  4 mg    Instructions:  Take 1 tablet (4 mg total) by mouth every 8 (eight) hours as needed for Nausea.     Begin Date    AM    Noon    PM    Bedtime       pantoprazole 40 MG tablet   Commonly known as:  PROTONIX   Quantity:  60 tablet   Refills:  0   Dose:  40 mg    Instructions:  Take 1 tablet (40 mg total) by mouth 2 (two) times daily.     Begin Date    AM    Noon    PM    Bedtime       PATADAY 0.2 % Drop   Refills:  0   Dose:  1 drop   Generic drug:  olopatadine    Instructions:  Place 1 drop into both eyes once daily.     Begin Date    AM    Noon    PM    Bedtime       pen needle, diabetic 32 gauge x 5/32" Ndle   Commonly known as:  BD ULTRA-FINE REJI PEN NEEDLES   Quantity:  150 each   Refills:  1    Instructions:  Uses 5 daily, on multiple daily insulin injections     Begin Date    AM    Noon    PM    Bedtime       potassium chloride SA 20 MEQ tablet   Commonly known as:  K-DUR,KLOR-CON   Quantity:  30 tablet   Refills:  9    Instructions:  TAKE 1 TABLET(20 MEQ) BY MOUTH EVERY DAY     Begin Date    AM    Noon    PM    Bedtime       tamsulosin 0.4 mg Cp24   Commonly known as:  FLOMAX   Quantity:  90 capsule   Refills:  5    Instructions:  TAKE 1 CAPSULE BY MOUTH EVERY DAY     Begin Date    AM    Noon    PM    Bedtime       * triamcinolone acetonide 0.1% 0.1 % cream   Commonly known as:  KENALOG   Refills:  0      Begin Date    AM    Noon    PM    Bedtime       * NASACORT 55 mcg nasal inhaler   Refills:  0   Dose:  2 spray   Generic drug:  triamcinolone    Instructions:  2 sprays by Nasal route once daily.     Begin Date    AM    Noon    PM    Bedtime       VITAMIN D2 50,000 unit Cap   Quantity:  24 capsule   Refills:  2   Generic drug:  ergocalciferol    Instructions:  TAKE 1 CAPSULE BY MOUTH 2 TIMES A WEEK     Begin Date    AM    Noon    PM    Bedtime       VITAMIN D3 5,000 unit Tab   Refills:  0   Dose:  5000 Units   Generic drug:  cholecalciferol (vitamin D3)    Instructions:  Take " 5,000 Units by mouth. Takes one tablet every day except on Sun and Wed     Begin Date    AM    Noon    PM    Bedtime       zolpidem 5 MG Tab   Commonly known as:  AMBIEN   Quantity:  30 tablet   Refills:  0   Dose:  5 mg    Instructions:  Take 1 tablet (5 mg total) by mouth every evening.     Begin Date    AM    Noon    PM    Bedtime       * Notice:  This list has 7 medication(s) that are the same as other medications prescribed for you. Read the directions carefully, and ask your doctor or other care provider to review them with you.               Please bring to all follow up appointments:    1. A copy of your discharge instructions.  2. All medicines you are currently taking in their original bottles.  3. Identification and insurance card.    Please arrive 15 minutes ahead of scheduled appointment time.    Please call 24 hours in advance if you must reschedule your appointment and/or time.        Your Scheduled Appointments     Feb 20, 2017  8:00 AM CST   Remote Interrogation with HOME MONITOR DEVICE CHECK, [facility]   [facility] - Arrhythmia ([address] )    [address]  [place] [id number] [phone]            Mar 24, 2017  2:30 PM CDT   Established Patient with LARRY Chaney - Endocrinology ([address] )    [address] [facility] [id number] [phone]            Mar 27, 2017  2:00 PM CDT   GASTROENTEROLOGY ESTABLISHED PATIENT with LEYDI Guerra - Gastroenterology ([address] )    [address]  [place] [address]   [phone]            Apr 20, 2017  3:30 PM CDT   Established Patient Visit with MD Pb Argueta-Physical Med & Rehab ([place] )    [address]  [place] [id number] [phone]              Referrals     Future Orders    Ambulatory Referral to Hepatology     Questions:    Is this a Hep-C patient?:  No        Discharge Instructions     Future Orders    Activity as tolerated      Call MD for:  difficulty breathing, headache or visual disturbances     Call MD for:  persistent nausea and vomiting     Call MD for:  severe uncontrolled pain     Call MD for:  temperature >100.4     Diet general     Questions:    Total calories:      Fat restriction, if any:      Protein restriction, if any:      Na restriction, if any:      Fluid restriction:      Additional restrictions:          Discharge Instructions         Upper GI Endoscopy     During endoscopy, a long, flexible tube is used to view the inside of your upper GI tract.      Upper GI endoscopy allows your healthcare provider to look directly into the beginning of your gastrointestinal (GI) tract. The esophagus, stomach, and duodenum (the first part of the small intestine) make up the upper GI tract.   Before the exam  Follow these and any other instructions you are given before your endoscopy. If you dont follow the healthcare providers instructions carefully, the test may need to be canceled or done over:  · Don't eat or drink anything after midnight the night before your exam. If your exam is in the afternoon, drink only clear liquids in the morning. Don't eat or drink anything for 8 hours before the exam. In some cases, you may be able to take medicines with sips of water until 2 hours before the procedure. Speak with your healthcare provider about this.   · Bring your X-rays and any other test results you have.  · Because you will be sedated, arrange for an adult to drive you home after the exam.  · Tell your healthcare provider before the exam if you are taking any medicines or have any medical problems.  The procedure  Here is what to expect:  · You will lie on the endoscopy table. Usually patients lie on the left side.  · You will be monitored and given oxygen.  · Your throat may be numbed with a spray or gargle. You are given medicine through an intravenous (IV) line that will help you relax and remain comfortable. You may be  "awake or asleep during the procedure.  · The healthcare provider will put the endoscope in your mouth and down your esophagus. It is thinner than most pieces of food that you swallow. It will not affect your breathing. The medicine helps keep you from gagging.  · Air is put into your GI tract to expand it. It can make you burp.  · During the procedure, the healthcare provider can take biopsies (tissue samples), remove abnormalities, such as polyps, or treat abnormalities through a variety of devices placed through the endoscope. You will not feel this.   · The endoscope carries images of your upper GI tract to a video screen. If you are awake, you may be able to look at the images.  · After the procedure is done, you will rest for a time. An adult must drive you home.  When to call your healthcare provider  Contact your healthcare provider if you have:  · Black or tarry stools, or blood in your stool  · Fever  · Pain in your belly that does not go away  · Nausea and vomiting, or vomiting blood   Date Last Reviewed: 7/1/2016  © 3381-1318 [employer]. [address] [place] [address]. All rights reserved. This information is not intended as a substitute for professional medical care. Always follow your healthcare professional's instructions.            Admission Information     Date & Time Provider Department CSN    2/6/2017  8:47 AM Shimon Salazar MD [facility] [id number]      Care Providers     Provider Role Specialty Primary office phone    Shimon Salazar MD Attending Provider Gastroenterology [phone]    Shimon Salazar MD Surgeon  Gastroenterology [phone]      Your Vitals Were     BP Pulse Temp Resp Height Weight    103/68 70 98 °F (36.7 °C) (Oral) 18 5' 8" (1.727 m) 110.7 kg (244 lb)    SpO2 BMI             94% 37.1 kg/m2         Recent Lab Values        3/24/2015 7/27/2015 10/28/2015 2/3/2016 4/30/2016 8/5/2016 10/18/2016 1/24/2017     12:00 PM  3:25 PM  " 7:11 AM  9:45 AM  7:32 AM  9:20 AM  4:00 PM  5:20 PM    A1C 10.7 (H) 9.9 (H) 8.9 (H) 9.5 (H) 9.2 (H) 8.5 (H) 10.8 (H) 11.3 (H)    Comment for A1C at  9:20 AM on 8/5/2016:  According to ADA guidelines, hemoglobin A1C <7.0% represents  optimal control in non-pregnant diabetic patients.  Different  metrics may apply to specific populations.   Standards of Medical Care in Diabetes - 2016.  For the purpose of screening for the presence of diabetes:  <5.7%     Consistent with the absence of diabetes  5.7-6.4%  Consistent with increasing risk for diabetes   (prediabetes)  >or=6.5%  Consistent with diabetes  Currently no consensus exists for use of hemoglobin A1C  for diagnosis of diabetes for children.      Comment for A1C at  4:00 PM on 10/18/2016:  According to ADA guidelines, hemoglobin A1C <7.0% represents  optimal control in non-pregnant diabetic patients.  Different  metrics may apply to specific populations.   Standards of Medical Care in Diabetes - 2016.  For the purpose of screening for the presence of diabetes:  <5.7%     Consistent with the absence of diabetes  5.7-6.4%  Consistent with increasing risk for diabetes   (prediabetes)  >or=6.5%  Consistent with diabetes  Currently no consensus exists for use of hemoglobin A1C  for diagnosis of diabetes for children.      Comment for A1C at  5:20 PM on 1/24/2017:  According to ADA guidelines, hemoglobin A1C <7.0% represents  optimal control in non-pregnant diabetic patients.  Different  metrics may apply to specific populations.   Standards of Medical Care in Diabetes - 2016.  For the purpose of screening for the presence of diabetes:  <5.7%     Consistent with the absence of diabetes  5.7-6.4%  Consistent with increasing risk for diabetes   (prediabetes)  >or=6.5%  Consistent with diabetes  Currently no consensus exists for use of hemoglobin A1C  for diagnosis of diabetes for children.        Pending Labs     Order Current Status    Specimen to Pathology - Surgery  Collected (02/06/17 0948)      Allergies as of 2/6/2017        Reactions    Cephalexin Hives, Shortness Of Breath    Penicillins Other (See Comments)    Unknown  Hardened skin    Pravastatin Other (See Comments)    myalgia    Sulfa (Sulfonamide Antibiotics)     Other reaction(s): Unknown    Adhesive Rash      Advance Directives     An advance directive is a document which, in the event you are no longer able to make decisions for yourself, tells your healthcare team what kind of treatment you do or do not want to receive, or who you would like to make those decisions for you.  If you do not currently have an advance directive, [facility] encourages you to create one.  For more information call:  [phone]), [phone] ([phone]),  or log on to [email or web address].        Language Assistance Services     ATTENTION: Language assistance services are available, free of charge. Please call [phone].      ATENCIÓN: Si habla sumanth, tiene a wilkerson disposición servicios gratuitos de asistencia lingüística. Llame al [phone].     [facility] Ý: N?u b?n nói Ti?ng Vi?t, có các d?ch v? h? tr? ngôn ng? mi?n phí dành cho b?n. G?i s? [phone].        Heart Failure Education       Heart Failure: Being Active  You have a condition called heart failure. Being active doesnt mean that you have to wear yourself out. Even a little movement each day helps to strengthen your heart. If you cant get out to exercise, you can do simple stretching and strengthening exercises at home. These are good ways to keep you well-conditioned and prevent you and your heart from becoming excessively weak.    Ideas to get you started  · Add a little movement to things you do now. Walk to mail letters. Park your car at the far end of the parking lot and walk to the store. Walk up a flight of stairs instead of taking the elevator.  · Choose activities you enjoy. You might walk, swim, or ride an exercise bike. Things like  gardening and washing the car count, too. Other possibilities include: washing dishes, walking the dog, walking around the mall, and doing aerobic activities with friends.  · Join a group exercise program at a [facility] or [facility], a senior center, or a community center. Or look into a hospital cardiac rehabilitation program. Ask your doctor if you qualify.  Tips to keep you going  · Get up and get dressed each day. Go to a coffee shop and read a newspaper or go somewhere that you'll be in the presence of other active people. Youll feel more like being active.  · Make a plan. Choose one or more activities that you enjoy and that you can easily do. Then plan to do at least one each day. You might write your plan on a calendar.  · Go with a friend or a group if you like company. This can help you feel supported and stay motivated, too.  · Plan social events that you enjoy. This will keep you mentally engaged as well as physically motivated to do things you find pleasure in.  For your safety  · Talk with your healthcare provider before starting an exercise program.  · Exercise indoors when its too hot or too cold outside, or when the air quality is poor. Try walking at a shopping mall.  · Wear socks and sturdy shoes to maintain your balance and prevent falls.  · Start slowly. Do a few minutes several times a day at first. Increase your time and speed little by little.  · Stop and rest whenever you feel tired or get short of breath.  · Dont push yourself on days when you dont feel well.  Date Last Reviewed: 3/20/2016  © 0214-6708 [employer]. [address] [place] [address]. All rights reserved. This information is not intended as a substitute for professional medical care. Always follow your healthcare professional's instructions.              Heart Failure: Evaluating Your Heart  You have a condition called heart failure. To evaluate your condition, your doctor will examine you, ask questions, and  do some tests. Along with looking for signs of heart failure, the doctor looks for any other health problems that may have led to heart failure. The results of your evaluation will help your doctor form a treatment plan.  Health history and physical exam  Your visit will start with a health history. Tell the doctor about any symptoms youve noticed and about all medicines you take. Then youll have a physical exam. This includes listening to your heartbeat and breathing. Youll also be checked for swelling (edema) in your legs and neck. When you have fluid buildup or fluid in the lungs, it may be called congestive heart failure.  Diagnosing heart failure     During an echocardiogram, sound waves bounce off the heart. These are converted into a picture on the screen.   The following may be done to help your doctor form a diagnosis:  · X-rays show the size and shape of your heart. These pictures can also show fluid in your lungs.  · An electrocardiogram (ECG or EKG) shows the pattern of your heartbeat. Small pads (electrodes) are placed on your chest, arms, and legs. Wires connect the pads to the ECG machine, which records your hearts electrical signals. This can give the doctor information about heart function.  · An echocardiogram uses ultrasound waves to show the structure and movement of your heart muscle. This shows how well the heart pumps. It also shows the thickness of the heart walls, and if the heart is enlarged. It is one of the most useful, non-invasive tests as it provides information about the heart's general function. This helps your doctor make treatment decisions.  · Lab tests evaluate small amounts of blood or urine for signs of problems. A BNP lab test can help diagnose and evaluate heart failure. BNP stands for B-type natriuretic peptide. The ventricles secrete more BNP when heart failure worsens. Lab tests can also provide information about metabolic dysfunction or heart dysfunction.  Your  treatment plan  Based on the results of your evaluation and tests, your doctor will develop a treatment plan. This plan is designed to relieve some of your heart failure symptoms and help make you more comfortable. Your treatment plan may include:  · Medicine to help your heart work better and improve your quality of life  · Changes in what you eat and drink to help prevent fluid from backing up in your body  · Daily monitoring of your weight and heart failure symptoms to see how well your treatment plan is working  · Exercise to help you stay healthy  · Help with quitting smoking  · Emotional and psychological support to help adjust to the changes  · Referrals to other specialists to make sure you are being treated comprehensively  Date Last Reviewed: 3/21/2016  © 6975-3850 [employer]. [address] [place] [address], [place] [address]. All rights reserved. This information is not intended as a substitute for professional medical care. Always follow your healthcare professional's instructions.              Heart Failure: Making Changes to Your Diet  You have a condition called heart failure. When you have heart failure, excess fluid is more likely to build up in your body because your heart isn't working well. This makes the heart work harder to pump blood. Fluid buildup causes symptoms such as shortness of breath and swelling (edema). This is often referred to as congestive heart failure or CHF. Controlling the amount of salt (sodium) you eat may help stop fluid from building up. Your doctor may also tell you to reduce the amount of fluid you drink.  Reading food labels    Your healthcare provider will tell you how much sodium you can eat each day. Read food labels to keep track. Keep in mind that certain foods are high in salt. These include canned, frozen, and processed foods. Check the amount of sodium in each serving. Watch out for high-sodium ingredients. These include MSG (monosodium glutamate), baking  soda, and sodium phosphate.   Eating less salt  Give yourself time to get used to eating less salt. It may take a little while. Here are some tips to help:  · Take the saltshaker off the table. Replace it with salt-free herb mixes and spices.  · Eat fresh or plain frozen vegetables. These have much less salt than canned vegetables.  · Choose low-sodium snacks like sodium-free pretzels, crackers, or air-popped popcorn.  · Dont add salt to your food when youre cooking. Instead, season your foods with pepper, lemon, garlic, or onion.  · When you eat out, ask that your food be cooked without added salt.  · Avoid eating fried foods as these often have a great deal of salt.  If youre told to limit fluids  You may need to limit how much fluid you have to help prevent swelling. This includes anything that is liquid at room temperature, such as ice cream and soup. If your doctor tells you to limit fluid, try these tips:  · Measure drinks in a measuring cup before you drink them. This will help you meet daily goals.  · Chill drinks to make them more refreshing.  · Suck on frozen lemon wedges to quench thirst.  · Only drink when youre thirsty.  · Chew sugarless gum or suck on hard candy to keep your mouth moist.  · Weigh yourself daily to know if your body's fluid content is rising.  My sodium goal  Your healthcare provider may give you a sodium goal to meet each day. This includes sodium found in food as well as salt that you add. My goal is to eat no more than ___________ mg of sodium per day.     When to call your doctor  Call your doctor right away if you have any symptoms of worsening heart failure. These can include:  · Sudden weight gain  · Increased swelling of your legs or ankles  · Trouble breathing when youre resting or at night  · Increase in the number of pillows you have to sleep on  · Chest pain, pressure, discomfort, or pain in the jaw, neck, or back   Date Last Reviewed: 3/21/2016  © 1369-2716 [employer]. [address] [place] [address], [place] [address]. All rights reserved. This information is not intended as a substitute for professional medical care. Always follow your healthcare professional's instructions.              Heart Failure: Medicines to Help Your Heart    You have a condition called heart failure (also known as congestive heart failure, or CHF). Your doctor will likely prescribe medicines for heart failure and any underlying health problems you have. Most heart failure patients take one or more types of medicinen. Your healthcare provider will work to find the combination of medicines that works best for you.  Heart failure medicines  Here are the most common heart failure medicines:  · ACE inhibitors lower blood pressure and decrease strain on the heart. This makes it easier for the heart to pump. Angiotensin receptor blockers have similar effects. These are prescribed for some patients instead of ACE inhibitors.  · Beta-blockers relieve stress on the heart. They also improve symptoms. They may also improve the heart's pumping action over time.  · Diuretics (also called water pills) help rid your body of excess water. This can help rid your body of swelling (edema). Having less fluid to pump means your heart doesnt have to work as hard. Some diuretics make your body lose a mineral called potassium. Your doctor will tell you if you need to take supplements or eat more foods high in potassium.  · Digoxin helps your heart pump with more strength. This helps your heart pump more blood with each beat. So, more oxygen-rich blood travels to the rest of the body.  · Aldosterone antagonists help alter hormones and decrease strain on the heart.  · Hydralazine and nitrates are two separate medicines used together to treat heart failure. They may come in one combination pill. They lower blood pressure and decrease how hard the heart has to pump.  Medicines for related conditions  Controlling  other heart problems helps keep heart failure under control, too. Depending on other heart problems you have, medicines may be prescribed to:  · Lower blood pressure (antihypertensives).  · Lower cholesterol levels (statins).  · Prevent blood clots (anticoagulants or aspirin).  · Keep the heartbeat steady (antiarrhythmics).  Date Last Reviewed: 3/5/2016  © 6118-7911 [employer]. [address] [place] [address], [place] [address]. All rights reserved. This information is not intended as a substitute for professional medical care. Always follow your healthcare professional's instructions.              Heart Failure: Procedures That May Help    The heart is a muscle that pumps oxygen-rich blood to all parts of the body. When you have heart failure, the heart is not able to pump as well as it should. Blood and fluid may back up into the lungs (congestive heart failure), and some parts of the body dont get enough oxygen-rich blood to work normally. These problems lead to the symptoms of heart failure.     Certain procedures may help the heart pump better in some cases of heart failure. Some procedures are done to treat health problems that may have caused the heart failure such as coronary artery disease or heart rhythm problems. For more serious heart failure, other options are available.  Treating artery and valve problems  If you have coronary artery disease or valve disease, procedures may be done to improve blood flow. This helps the heart pump better, which can improve heart failure symptoms. First, your doctor may do a cardiac catheterization to help detect clogged blood vessels or valve damage. During this procedure, a  thin tube (catheter) in inserted into a blood vessel and guided to the heart. There a dye is injected and a special type of X-ray (angiogram) is taken of the blood vessels. Procedures to open a blocked artery or fix damaged valves can also be done using catheterization.  · Angioplasty uses a  balloon-tipped instrument at the end of the catheter. The balloon is inflated to widen the narrowed artery. In many cases, a stent is expanded to further support the narrowed artery. A stent is a metal mesh tube.  · Valve surgery repairs or replacement of faulty valves can also be done during catheterization so blood can flow properly through the chambers of the heart.  Bypass surgery is another option to help treat blocked arteries. It uses a healthy blood vessel from elsewhere in the body. The healthy blood vessel is attached above and below the blocked area so that blood can flow around the blocked artery.  Treating heart rhythm problems  A device may be placed in the chest to help a weak heart maintain a healthy, heartbeat so the heart can pump more effectively:  · Pacemaker. A pacemaker is an implanted device that regulates your heartbeat electronically. It monitors your heart's rhythm and generates a painless electric impulse that helps the heart beat in a regular rhythm. A pacemaker is programmed to meet your specific heart rhythm needs.  · Biventricular pacing/cardiac resynchronization therapy. A type of pacemaker that paces both pumping chambers of the heart at the same time to coordinate contractions and to improve the heart's function. Some people with heart failure are candidates for this therapy.  · Implantable cardioverter defibrillator. A device similar to a pacemaker that senses when the heart is beating too fast and delivers an electrical shock to convert the fast rhythm to a normal rhythm. This can be a life saving device.  In severe cases  In more serious cases of heart failure when other treatments no longer work, other options may include:  · Ventricular assist devices (VADs). These are mechanical devices used to take over the pumping function for one or both of the heart's ventricles, or pumping chambers. A VAD may be necessary when heart failure progresses to the point that medicines and other  treatments no longer help. In some cases, a VAD may be used as a bridge to transplant.  · Heart transplant. This is replacing the diseased heart with a healthy one from a donor. This is an option for a few people who are very sick. A heart transplant is very serious and not an option for all patients. Your doctor can tell you more.  Date Last Reviewed: 3/20/2016  © 9603-4994 [employer]. [address] [place] [address], [place] [address]. All rights reserved. This information is not intended as a substitute for professional medical care. Always follow your healthcare professional's instructions.              Heart Failure: Tracking Your Weight  You have a condition called heart failure. When you have heart failure, a sudden weight gain or a steady rise in weight is a warning sign that your body is retaining too much water and salt. This could mean your heart failure is getting worse. If left untreated, it can cause problems for your lungs and result in shortness of breath. Weighing yourself each day is the best way to know if youre retaining water. If your weight goes up quickly, call your doctor. You will be given instructions on how to get rid of the excess water. You will likely need medicines and to avoid salt. This will help your heart work better.  Call your doctor if you gain more than 2 pounds in 1 day, more than 5 pounds in 1 week, or whatever weight gain you were told to report by your doctor. This is often a sign of worsening heart failure and needs to be evaluated and treated. Your doctor will tell you what to do next.   Tips for weighing yourself    · Weigh yourself at the same time each morning, wearing the same clothes. Weigh yourself after urinating and before eating.  · Use the same scale each day. Make sure the numbers are easy to read. Put the scale on a flat, hard surface -- not on a rug or carpet.  · Do not stop weighing yourself. If you forget one day, weigh again the next morning.  How  to use your weight chart  · Keep your weight chart near the scale. Write your weight on the chart as soon as you get off the scale.  · Fill in the month and the start date on the chart. Then write down your weight each day. Your chart will look like this:    · If you miss a day, leave the space blank. Weigh yourself the next day and write your weight in the next space.  · Take your weight chart with you when you go to see your doctor.  Date Last Reviewed: 3/20/2016  © 7325-2426 [employer]. [address] [place] [address]. All rights reserved. This information is not intended as a substitute for professional medical care. Always follow your healthcare professional's instructions.              Heart Failure: Warning Signs of a Flare-Up  You have a condition called heart failure. Once you have heart failure, flare-ups can happen. Below are signs that can mean your heart failure is getting worse. If you notice any of these warning signs, call your healthcare provider.  Swelling    · Your feet, ankles, or lower legs get puffier.  · You notice skin changes on your lower legs.  · Your shoes feel too tight.  · Your clothes are tighter in the waist.  · You have trouble getting rings on or off your fingers.  Shortness of breath  · You have to breathe harder even when youre doing your normal activities or when youre resting.  · You are short of breath walking up stairs or even short distances.  · You wake up at night short of breath or coughing.  · You need to use more pillows or sit up to sleep.  · You wake up tired or restless.  Other warning signs  · You feel weaker, dizzy, or more tired.  · You have chest pain or changes in your heartbeat.  · You have a cough that wont go away.  · You cant remember things or dont feel like eating.  Tracking your weight  Gaining weight is often the first warning sign that heart failure is getting worse. Gaining even a few pounds can be a sign that your body is  retaining excess water and salt. Weighing yourself each day in the morning after you urinate and before you eat, is the best way to know if you're retaining water. Get a scale that is easy to read and make sure you wear the same clothes and use the same scale every time you weigh. Your healthcare provider will show you how to track your weight. Call your doctor if you gain more than 2 pounds in 1 day, 5 pounds in 1 week, or whatever weight gain you were told to report by your doctor. This is often a sign of worsening heart failure and needs to be evaluated and treated before it compromises your breathing. Your doctor will tell you what to do next.    Date Last Reviewed: 3/15/2016  © 2231-8902 [employer]. [address] [place] [address], [place] [address]. All rights reserved. This information is not intended as a substitute for professional medical care. Always follow your healthcare professional's instructions.              Chronic Kindey Disease Education             Diabetes Discharge Instructions                                   [facility] complies with applicable Federal civil rights laws and does not discriminate on the basis of race, color, national origin, age, disability, or sex.

## 2017-02-06 NOTE — ANESTHESIA PREPROCEDURE EVALUATION
2017  Vinh Castro is a 66 y.o., male.  Pre-operative evaluation for ESOPHAGOGASTRODUODENOSCOPY (EGD) (N/A)    Chief Complaint:belching  PMH:  HFpEF, CAD s/p CABG, AFL s/p RFA CTA and AFib s/p PVI, DM2 with retinopathy, nephropathy, HTN, HLD  SEAN-uses CPAP  Past Surgical History   Procedure Laterality Date    Tonsillectomy      Gallbladder surgery      Hernia repair      Cataract extraction bilateral w/ anterior vitrectomy      Hand surgery       LOST THREE FINGERS/left     Coronary artery bypass graft       lima LAD SVG OM1    Retinal laser procedure      Radiofrequency ablation       atrial flutter    Coronary angioplasty       Patent LIMA to LAD, SVG to OM, occluded LAD, 60% Circ-x1cv stent placed    Eye surgery       retina repair    Skin biopsy       head    Joint replacement      Abdominal surgery           Vital Signs Range (Last 24H):         CBC:     Recent Labs  Lab 17  1720   WBC 8.78   RBC 3.77*   HGB 12.4*   HCT 36.6*      MCV 97   MCH 32.9*   MCHC 33.9       CMP:   Recent Labs  Lab 17  1720   *   K 5.6*   CL 94*   CO2 24   BUN 36*   CREATININE 2.0*   *   CALCIUM 9.9   ALBUMIN 3.6   PROT 8.8*   ALKPHOS 114   *   *   BILITOT 0.6       INR:  No results for input(s): INR, PROTIME, APTT in the last 720 hours.    Invalid input(s): PT      Diagnostic Studies:      [facility] Echo:  [facility] Anesthesia Evaluation    I have reviewed the Patient Summary Reports.     I have reviewed the Medications.     Review of Systems  Anesthesia Hx:  No problems with previous Anesthesia    Social:  Non-Smoker    Cardiovascular:  Cardiovascular Normal     Pulmonary:  Pulmonary Normal    Neurological:  Neurology Normal        Physical Exam  General:  Morbid Obesity    Airway/Jaw/Neck:  Airway Findings: Mouth Opening: Normal Tongue: Normal   General Airway Assessment: Good  Mallampati: IV  TM Distance: Normal, at least 6 cm  Jaw/Neck Findings:  Neck ROM: Extension Decreased, Mild  Neck Findings:  Girth Increased, Short Neck      Dental:  Dental Findings: In tact   Chest/Lungs:  Chest/Lungs Findings: Clear to auscultation, Normal Respiratory Rate     Heart/Vascular:  Heart Findings: Rate: Normal  Rhythm: Regular Rhythm  Sounds: Normal        Mental Status:  Mental Status Findings:  Cooperative, Alert and Oriented         Anesthesia Plan  Type of Anesthesia, risks & benefits discussed:  Anesthesia Type:  general  Patient's Preference:   Intra-op Monitoring Plan:   Intra-op Monitoring Plan Comments:   Post Op Pain Control Plan:   Post Op Pain Control Plan Comments:   Induction:   IV  Beta Blocker:  Patient is on a Beta-Blocker and has received one dose within the past 24 hours (No further documentation required).       Informed Consent: Patient understands risks and agrees with Anesthesia plan.  Questions answered. Anesthesia consent signed with patient.  ASA Score: 3     Day of Surgery Review of History & Physical:    H&P update referred to the surgeon.     Anesthesia Plan Notes: Noted that he was intubated using Glidescope/fiberoptic intubation in past.        Ready For Surgery From Anesthesia Perspective.

## 2017-02-06 NOTE — DISCHARGE INSTRUCTIONS

## 2017-02-09 ENCOUNTER — TELEPHONE (OUTPATIENT)
Dept: CARDIOLOGY | Facility: CLINIC | Age: 67
End: 2017-02-09

## 2017-02-09 NOTE — TELEPHONE ENCOUNTER
----- Message from Zeus Villegas sent at 2/9/2017  1:59 PM CST -----  Contact: patient  Please call pt at [phone] today per pt. He needs to know the status on his cardiac clearance? Pt having eye surgery on 02/16/17and Dr Penn ([facility]) and a cardiac clearance was faxed on 02/03/17. Need to get that back stat.  Please send the written clearance to Kinjal/Dr Penn at [phone]/fax# [phone]. Last seen Dr TOREY Galloway 05/22/15 and Dr Galloway was requested    3rd phone request per patient      Thank you

## 2017-02-10 ENCOUNTER — TELEPHONE (OUTPATIENT)
Dept: INTERNAL MEDICINE | Facility: CLINIC | Age: 67
End: 2017-02-10

## 2017-02-10 NOTE — TELEPHONE ENCOUNTER
----- Message from Ava Collins sent at 2/10/2017  1:32 PM CST -----  Contact: Mobile: [phone]   You referred him to  Dr. Salazar and he referred him to  Dr. Coco Meyers, Hepatologist. He want to figure out how he can combine these visits since he lives about an hour away. Would like to see one in [place] at the [facility].

## 2017-02-13 DIAGNOSIS — F51.01 PRIMARY INSOMNIA: ICD-10-CM

## 2017-02-13 RX ORDER — ZOLPIDEM TARTRATE 5 MG/1
TABLET ORAL
Qty: 30 TABLET | Refills: 0 | Status: SHIPPED | OUTPATIENT
Start: 2017-02-13 | End: 2017-04-02 | Stop reason: SDUPTHER

## 2017-02-14 ENCOUNTER — TELEPHONE (OUTPATIENT)
Dept: ELECTROPHYSIOLOGY | Facility: CLINIC | Age: 67
End: 2017-02-14

## 2017-02-14 ENCOUNTER — HOSPITAL ENCOUNTER (OUTPATIENT)
Dept: PULMONOLOGY | Facility: HOSPITAL | Age: 67
Discharge: HOME | End: 2017-02-14
Attending: OPHTHALMOLOGY
Payer: MEDICARE

## 2017-02-14 DIAGNOSIS — Z01.818 PRE-OP EXAM: ICD-10-CM

## 2017-02-14 PROCEDURE — 93010 ELECTROCARDIOGRAM REPORT: CPT | Mod: ,,, | Performed by: INTERNAL MEDICINE

## 2017-02-14 PROCEDURE — 93005 ELECTROCARDIOGRAM TRACING: CPT

## 2017-02-14 NOTE — TELEPHONE ENCOUNTER
Returned call to Kinjal.  Dr. Penn's office was requesting an EKG, and a signature for patient's procedure.  Kinjal states no longer needed will do at [facility]

## 2017-02-14 NOTE — TELEPHONE ENCOUNTER
Nurse called and spoke with Kinjal with Dr Aly's office requesting preop medical clearance. Nurse informed Ms Layton that I did not get anything regarding this and Dr Derrick Galloway does not do clearances. Patient will have to see pcp, or general cardiologist (DR KENNEY) for clearance. Ms Layton got out done b/c procedure is this Thursday and they will try to get patient to come in early to be cleared.    ----- Message from Maura Ramirez sent at 2/14/2017  9:15 AM CST -----  Contact: Kinjal moeller/ Dr. Aly's office  Kinjal called about the fax they received yesterday for the medical clearance.  She needs additional info.  She can be reached @ [phone].  Thanks

## 2017-02-17 ENCOUNTER — TELEPHONE (OUTPATIENT)
Dept: INTERNAL MEDICINE | Facility: CLINIC | Age: 67
End: 2017-02-17

## 2017-02-17 NOTE — TELEPHONE ENCOUNTER
----- Message from Walter Estrada sent at 2/17/2017  9:11 AM CST -----  Contact: Self [phone]  The above pt is requesting a call back regarding an ICD 10 code, stated that it has to do with Tatyana, please advice    Thanks

## 2017-02-19 DIAGNOSIS — K21.9 GASTROESOPHAGEAL REFLUX DISEASE, ESOPHAGITIS PRESENCE NOT SPECIFIED: ICD-10-CM

## 2017-02-19 DIAGNOSIS — R11.0 NAUSEA: ICD-10-CM

## 2017-02-19 RX ORDER — PANTOPRAZOLE SODIUM 40 MG/1
TABLET, DELAYED RELEASE ORAL
Qty: 60 TABLET | Refills: 3 | Status: SHIPPED | OUTPATIENT
Start: 2017-02-19 | End: 2017-06-24 | Stop reason: SDUPTHER

## 2017-02-20 ENCOUNTER — CLINICAL SUPPORT (OUTPATIENT)
Dept: ELECTROPHYSIOLOGY | Facility: CLINIC | Age: 67
End: 2017-02-20
Payer: MEDICARE

## 2017-02-20 ENCOUNTER — LAB VISIT (OUTPATIENT)
Dept: LAB | Facility: HOSPITAL | Age: 67
End: 2017-02-20
Attending: INTERNAL MEDICINE
Payer: MEDICARE

## 2017-02-20 ENCOUNTER — OFFICE VISIT (OUTPATIENT)
Dept: HEPATOLOGY | Facility: CLINIC | Age: 67
End: 2017-02-20
Payer: MEDICARE

## 2017-02-20 VITALS
HEIGHT: 68 IN | BODY MASS INDEX: 39.42 KG/M2 | RESPIRATION RATE: 16 BRPM | SYSTOLIC BLOOD PRESSURE: 127 MMHG | TEMPERATURE: 98 F | HEART RATE: 68 BPM | OXYGEN SATURATION: 92 % | DIASTOLIC BLOOD PRESSURE: 57 MMHG | WEIGHT: 260.13 LBS

## 2017-02-20 DIAGNOSIS — R79.89 ELEVATED LFTS: Primary | ICD-10-CM

## 2017-02-20 DIAGNOSIS — R79.89 ELEVATED LFTS: ICD-10-CM

## 2017-02-20 DIAGNOSIS — K73.9 CHRONIC HEPATITIS: ICD-10-CM

## 2017-02-20 DIAGNOSIS — K31.89 PORTAL HYPERTENSIVE GASTROPATHY: ICD-10-CM

## 2017-02-20 DIAGNOSIS — N18.30 CHRONIC KIDNEY DISEASE, STAGE III (MODERATE): ICD-10-CM

## 2017-02-20 DIAGNOSIS — I50.30 HEART FAILURE WITH PRESERVED EJECTION FRACTION: ICD-10-CM

## 2017-02-20 DIAGNOSIS — I10 ESSENTIAL HYPERTENSION: ICD-10-CM

## 2017-02-20 DIAGNOSIS — Z95.818 STATUS POST PLACEMENT OF IMPLANTABLE LOOP RECORDER: ICD-10-CM

## 2017-02-20 DIAGNOSIS — R55 SYNCOPE: ICD-10-CM

## 2017-02-20 DIAGNOSIS — K76.6 PORTAL HYPERTENSIVE GASTROPATHY: ICD-10-CM

## 2017-02-20 LAB
AFP SERPL-MCNC: 2.3 NG/ML
ALBUMIN SERPL BCP-MCNC: 3.3 G/DL
ALP SERPL-CCNC: 105 U/L
ALP SERPL-CCNC: 105 U/L
ALT SERPL W/O P-5'-P-CCNC: 90 U/L
ALT SERPL W/O P-5'-P-CCNC: 90 U/L
ANION GAP SERPL CALC-SCNC: 9 MMOL/L
AST SERPL-CCNC: 87 U/L
AST SERPL-CCNC: 87 U/L
BASOPHILS # BLD AUTO: 0.03 K/UL
BASOPHILS NFR BLD: 0.4 %
BILIRUB DIRECT SERPL-MCNC: 0.2 MG/DL
BILIRUB SERPL-MCNC: 0.5 MG/DL
BILIRUB SERPL-MCNC: 0.5 MG/DL
BUN SERPL-MCNC: 28 MG/DL
CALCIUM SERPL-MCNC: 9.7 MG/DL
CERULOPLASMIN SERPL-MCNC: 29 MG/DL
CHLORIDE SERPL-SCNC: 101 MMOL/L
CO2 SERPL-SCNC: 27 MMOL/L
CREAT SERPL-MCNC: 1.7 MG/DL
DIFFERENTIAL METHOD: ABNORMAL
EOSINOPHIL # BLD AUTO: 0.2 K/UL
EOSINOPHIL NFR BLD: 3 %
ERYTHROCYTE [DISTWIDTH] IN BLOOD BY AUTOMATED COUNT: 13.5 %
EST. GFR  (AFRICAN AMERICAN): 47.2 ML/MIN/1.73 M^2
EST. GFR  (NON AFRICAN AMERICAN): 40.8 ML/MIN/1.73 M^2
FERRITIN SERPL-MCNC: 167 NG/ML
GLUCOSE SERPL-MCNC: 188 MG/DL
HBV CORE AB SERPL QL IA: NEGATIVE
HBV SURFACE AB SER-ACNC: NEGATIVE M[IU]/ML
HCT VFR BLD AUTO: 34.6 %
HEPATITIS A ANTIBODY, IGG: POSITIVE
HGB BLD-MCNC: 11.8 G/DL
IGG SERPL-MCNC: 1565 MG/DL
INR PPP: 1
IRON SERPL-MCNC: 79 UG/DL
LYMPHOCYTES # BLD AUTO: 1.3 K/UL
LYMPHOCYTES NFR BLD: 18.2 %
MCH RBC QN AUTO: 33.3 PG
MCHC RBC AUTO-ENTMCNC: 34.1 %
MCV RBC AUTO: 98 FL
MONOCYTES # BLD AUTO: 0.8 K/UL
MONOCYTES NFR BLD: 10.7 %
NEUTROPHILS # BLD AUTO: 4.7 K/UL
NEUTROPHILS NFR BLD: 66.9 %
PHOSPHATE SERPL-MCNC: 3.8 MG/DL
PLATELET # BLD AUTO: 172 K/UL
PMV BLD AUTO: 9.6 FL
POTASSIUM SERPL-SCNC: 4.7 MMOL/L
PROT SERPL-MCNC: 8.1 G/DL
PROT SERPL-MCNC: 8.1 G/DL
PROTHROMBIN TIME: 11 SEC
RBC # BLD AUTO: 3.54 M/UL
SATURATED IRON: 19 %
SODIUM SERPL-SCNC: 137 MMOL/L
TOTAL IRON BINDING CAPACITY: 419 UG/DL
TRANSFERRIN SERPL-MCNC: 283 MG/DL
WBC # BLD AUTO: 7.08 K/UL

## 2017-02-20 PROCEDURE — 93299 LOOP RECORDER REMOTE: CPT | Mod: PBBFAC | Performed by: INTERNAL MEDICINE

## 2017-02-20 PROCEDURE — 99999 PR PBB SHADOW E&M-EST. PATIENT-LVL V: CPT | Mod: PBBFAC,,, | Performed by: NURSE PRACTITIONER

## 2017-02-20 PROCEDURE — 99215 OFFICE O/P EST HI 40 MIN: CPT | Mod: S$PBB,,, | Performed by: NURSE PRACTITIONER

## 2017-02-20 PROCEDURE — 93297 REM INTERROG DEV EVAL ICPMS: CPT | Mod: ,,, | Performed by: INTERNAL MEDICINE

## 2017-02-20 NOTE — PROGRESS NOTES
HEPATOLOGY CONSULTATION    Referring Physician: Shimon Salazar MD   Current Corresponding Physician: Shimon Salazar MD     Reason for Consultation: Consultation for evaluation of Elevated Hepatic Enzymes    History of Present Illness: Vinh Castro is a 66 y.o. male who presents for evaluation of   Chief Complaint   Patient presents with    Elevated Hepatic Enzymes     Per patient was told he had fatty liver 10 + years ago. He has additional PMH of DM uncontrolled, HTN, HLD, CKD, obesity w/ BMI 39.   Patient recalls hx of hepatitis B dx 1981 when he presented with jaundice and fatigue requiring hospitalization.  Never treated to his knowledge.    Denies symptoms of decompensation  Denies IVDU, intranasal drug use, tattoos, blood transfusions  Alcohol use, no heavy use  Denies family hx of liver disease     Review of available records reveal:  Intermittently elevated transaminases since at least 2012, normalized in 2014 but now with upward trend  Normal ALP and TB  Normal PLT and INR    Hep A Igm neg  Hep B Igm neg  Hep B sAg neg  Hep C Ab neg    ECHO 5/2016: EF 55  1 - Normal left ventricular systolic function (EF 55-60%).     2 - Normal right ventricular systolic function .     3 - Left ventricular diastolic dysfunction grade II.     4 - Moderate left atrial enlargement.     5. Inadequate TR envelope to assess PA pressure    EGD 2/2017: normal esophagus, congested and erythematous mucosa in the gastric fundus and gastric body, suspicious for PHTNG    CT 2014: normal liver, normal biliary ducts, pancreas replaced by fat,     Other noted hx:DM uncontrolled, CKD,  HLD, ED, GERD diabetic retinopathy, PAD, glaucoma, DDD, BPH, BMI 39, CAD w/ hx AFib   Past Medical History   Diagnosis Date    *Atrial fibrillation     Acquired hypothyroidism 5/4/2016    Allergy     Anticoagulant long-term use     Arthritis     CAD (coronary artery disease)      CABG 2002, PCI    CHF (congestive heart failure)     Chronic  anticoagulation     Coronary artery disease     Depression     Diabetic nephropathy     Diabetic retinopathy associated with type 2 diabetes mellitus     Diverticulosis     Glaucoma      patient denies having glaucoma    History of BPH     History of peripheral vascular disease      Bilateral Carotid Artery Stenosis < 40% on Carotid US 7/2012    Hyperlipidemia     Hypertension     Obesity     SEAN (obstructive sleep apnea)      uses C-PAP    Personal history of kidney stones     PN (peripheral neuropathy)     Syncope 5/23/2016    Type 2 diabetes mellitus not at goal      Outpatient Encounter Prescriptions as of 2/20/2017   Medication Sig Dispense Refill    alprazolam (XANAX) 0.5 MG tablet Take 0.5 mg by mouth daily as needed.       aspirin 81 MG Chew Take 81 mg by mouth once daily.      calcium carbonate-simethicone (MAALOX ADVANCED) 1,000-60 mg Chew Take 2 tablets by mouth 3 (three) times daily as needed.      cholecalciferol, vitamin D3, (VITAMIN D3) 5,000 unit Tab Take 5,000 Units by mouth. Takes one tablet every day except on Sun and Wed      ciprofloxacin HCl (CILOXAN) 0.3 % ophthalmic solution       CREON 24,000-76,000 -120,000 unit capsule TAKE 2 CAPSULES BY MOUTH THREE TIMES DAILY WITH MEALS, 2 WITH MEALS AND 1 WITH SNACK 210 capsule 0    DEXTROSE (GLUCOSE) Chew Take by mouth as needed.      ELIQUIS 5 mg Tab TAKE 1 TABLET BY MOUTH TWICE DAILY 60 tablet 0    ferrous sulfate 325 mg (65 mg iron) Tab tablet TAKE 1 TABLET BY MOUTH EVERY DAY 90 tablet 0    fexofenadine (ALLEGRA) 180 MG tablet Take 180 mg by mouth once daily.      fluocinolone-shower cap 0.01 % Oil       fluticasone (FLONASE) 50 mcg/actuation nasal spray SHAKE WELL AND USE 2 SPRAYS IN EACH NOSTRIL EVERY DAY 1 Bottle 5    furosemide (LASIX) 80 MG tablet Take 80 mg by mouth once daily.      guaifenesin (MUCINEX) 600 mg 12 hr tablet Take 600 mg by mouth continuous prn for Congestion. Prn        HUMULIN R U-500,  CONCENTRATED, 500 unit/mL Soln INJECT INTO SKIN 32 UNIT JORDY ON REGULAR U-100 SYRINGE BEFORE MEALS AND WITH PM SNACK 20 mL 3    hydrocodone-acetaminophen 10-325mg (NORCO)  mg Tab Take 1 tablet by mouth 4 (four) times daily. 120 tablet 0    isosorbide mononitrate (IMDUR) 30 MG 24 hr tablet Take 1 tablet (30 mg total) by mouth every evening. 30 tablet 11    KRILL/OM3/DHA/EPA/OM6/LIP/ASTX (KRILL OIL, OMEGA 3 & 6, ORAL) Take 1 tablet by mouth every evening.       levothyroxine (SYNTHROID) 25 MCG tablet Take 1 tablet (25 mcg total) by mouth once daily. 30 tablet 11    losartan (COZAAR) 50 MG tablet TAKE 1 TABLET BY MOUTH ONCE DAILY 90 tablet 3    metolazone (ZAROXOLYN) 5 MG tablet Take 1 tablet (5 mg total) by mouth daily as needed (Weight gain of 5 pounds or more). 30 tablet 6    metoprolol tartrate (LOPRESSOR) 50 MG tablet Take 1 tablet (50 mg total) by mouth 2 (two) times daily. 60 tablet 11    moxifloxacin (AVELOX) 400 mg tablet TK 1 T PO QD FOR 8 DAYS  0    MV-MN/FA/LYCOPENE/LUT/HB#178 (RAMEZ MULTIVITAMIN FOR MEN ORAL) Take by mouth.      NEVANAC 0.1 % ophthalmic suspension       nitroGLYCERIN (NITROSTAT) 0.4 MG SL tablet Place 1 tablet (0.4 mg total) under the tongue every 5 (five) minutes as needed. 25 tablet 6    ofloxacin (FLOXIN) 0.3 % otic solution Place 3 drops into both ears 2 (two) times daily.       ondansetron (ZOFRAN) 4 MG tablet Take 1 tablet (4 mg total) by mouth every 8 (eight) hours as needed for Nausea. 30 tablet 0    oxymetazoline (MUCINEX SINUS-MAX) 0.05 % nasal spray 2 sprays by Nasal route once daily.      pantoprazole (PROTONIX) 40 MG tablet TAKE 1 TABLET(40 MG) BY MOUTH TWICE DAILY 60 tablet 3    PATADAY 0.2 % Drop Place 1 drop into both eyes once daily.       potassium chloride SA (K-DUR,KLOR-CON) 20 MEQ tablet TAKE 1 TABLET(20 MEQ) BY MOUTH EVERY DAY 30 tablet 9    tamsulosin (FLOMAX) 0.4 mg Cp24 TAKE 1 CAPSULE BY MOUTH EVERY DAY 90 capsule 5    triamcinolone  "(NASACORT) 55 mcg nasal inhaler 2 sprays by Nasal route once daily.      triamcinolone acetonide 0.1% (KENALOG) 0.1 % cream       VITAMIN D2 50,000 unit capsule TAKE 1 CAPSULE BY MOUTH 2 TIMES A WEEK 24 capsule 2    zolpidem (AMBIEN) 5 MG Tab TAKE 1 TABLET BY MOUTH EVERY EVENING 30 tablet 0    blood sugar diagnostic (ACCU-CHEK JANEY PLUS TEST STRP) Strp Patient to check blood sugar 6 x's per day--Dx Code 250.62,  357.2 600 each 1    diclofenac sodium (VOLTAREN) 1 % Gel Apply 2 g topically 4 (four) times daily. Apply to both hand 3 Tube 2    gabapentin (NEURONTIN) 600 MG tablet Take 1 tablet (600 mg total) by mouth 4 (four) times daily with meals and nightly. 360 tablet 2    hydrocodone-acetaminophen 10-325mg (NORCO)  mg Tab Take 1 tablet by mouth 4 (four) times daily. 120 tablet 0    insulin needles, disposable, (BD ULTRA-FINE REJI PEN NEEDLES) 32 x 5/32 " Ndle Uses 5 daily, on multiple daily insulin injections 150 each 1    insulin syringe-needle U-100 1/2 mL 30 Syrg       insulin syringe-needle U-100 1/2 mL 30 Syrg USE WITH INSULIN THREE TIMES DAILY TO FOUR TIMES DAILY 400 each 2    lancets (BD ULTRA FINE LANCETS) Misc 1 Units by Misc.(Non-Drug; Combo Route) route 4 (four) times daily. 200 each 11    [DISCONTINUED] ASPIRIN/ACETAMINOPHEN/CAFFEINE (EXCEDRIN EXTRA STRENGTH ORAL) Take by mouth as needed.      [DISCONTINUED] hydrocodone-acetaminophen 10-325mg (NORCO)  mg Tab Take 1 tablet by mouth 4 (four) times daily. 120 tablet 0     No facility-administered encounter medications on file as of 2/20/2017.      Review of patient's allergies indicates:   Allergen Reactions    Cephalexin Hives and Shortness Of Breath    Penicillins Other (See Comments)     Unknown  Hardened skin    Pravastatin Other (See Comments)     myalgia    Sulfa (sulfonamide antibiotics)      Other reaction(s): Unknown    Adhesive Rash     Family History   Problem Relation Age of Onset    Cancer Mother      Rhumatoid " lung disease    Diabetes Father     Heart attack Father     Heart disease Father     Cancer Father      Lymphona    Obesity Sister     Diabetes Paternal Grandfather     Anesthesia problems Neg Hx        Social History     Social History    Marital status:      Spouse name: Sandra    Number of children: 1    Years of education: N/A     Occupational History    Not on file.     Social History Main Topics    Smoking status: Never Smoker    Smokeless tobacco: Never Used    Alcohol use No    Drug use: No    Sexual activity: No     Other Topics Concern    Not on file     Social History Narrative    Work: Manager at [employer]ical activity: None.Hobbies: Fishing.     Review of Systems  Vitals:    02/20/17 0957   BP: (!) 127/57   Pulse: 68   Resp: 16   Temp: 98.1 °F (36.7 °C)       Physical Exam    MELD-Na score: 16 at 5/24/2016  5:21 AM  MELD score: 13 at 5/24/2016  5:21 AM  Calculated from:  Serum Creatinine: 1.7 mg/dL at 5/24/2016  5:21 AM  Serum Sodium: 134 mmol/L at 5/24/2016  5:21 AM  Total Bilirubin: 0.6 mg/dL (Rounded to 1) at 5/23/2016 12:39 PM  INR(ratio): 1.1 at 5/23/2016 12:39 PM  Age: 65 years    Lab Results   Component Value Date     (H) 02/14/2017    BUN 32 (H) 02/14/2017    CREATININE 1.8 (H) 02/14/2017    CALCIUM 9.4 02/14/2017     02/14/2017    K 5.0 02/14/2017     02/14/2017    PROT 8.8 (H) 01/24/2017    CO2 25 02/14/2017    ANIONGAP 9 02/14/2017    WBC 6.12 02/14/2017    RBC 3.65 (L) 02/14/2017    HGB 12.0 (L) 02/14/2017    HCT 36.0 (L) 02/14/2017    HCT 24 (L) 08/27/2014    MCV 99 (H) 02/14/2017    MCH 32.9 (H) 02/14/2017    MCHC 33.3 02/14/2017     Lab Results   Component Value Date    RDW 13.2 02/14/2017     02/14/2017    MPV 10.0 02/14/2017    GRAN 4.1 02/14/2017    GRAN 67.7 02/14/2017    LYMPH 1.0 02/14/2017    LYMPH 16.0 (L) 02/14/2017    MONO 0.8 02/14/2017    MONO 12.4 02/14/2017    EOSINOPHIL 2.9 02/14/2017    BASOPHIL 1.0 02/14/2017    EOS 0.2  02/14/2017    BASO 0.06 02/14/2017    APTT 41.9 (H) 08/24/2015    GROUPTRH B POS 08/24/2015     (H) 05/23/2016    CHOL 205 (H) 04/30/2016    TRIG 248 (H) 04/30/2016    HDL 29 (L) 04/30/2016    CHOLHDL 14.1 (L) 04/30/2016    TOTALCHOLEST 7.1 (H) 04/30/2016    ALBUMIN 3.6 01/24/2017    BILIDIR 0.3 05/07/2016     (H) 01/24/2017     (H) 01/24/2017    ALKPHOS 114 01/24/2017    MG 2.2 12/17/2014    LABPROT 11.0 05/23/2016    INR 1.1 05/23/2016    INR 1.9 (A) 02/26/2014    PSA 0.17 07/10/2013       Assessment and Plan:  Elevated LFTs: w/o possible advanced liver disease as evidenced by portal hypertensive gastropathy seen in EGD  -liver w/u serologies  -fibrosure today to assess for cirrhosis  -update imaging: US abdomen complete  -recommend Hep A/B vaccine    Total duration of visit = 40 min, with > 50% spent counseling  RTC 4-6 weeks    Thank you for allowing me participate in this patient's care.   Patient Active Problem List   Diagnosis    SEAN (obstructive sleep apnea)    Hyperlipidemia    Erectile dysfunction    Peyronie's disease    GERD (gastroesophageal reflux disease)     (diabetic retinopathy)    History of colonic polyps    PAD (peripheral artery disease)    Chronic allergic rhinitis    Depression    Steatosis of liver    Glaucoma (increased eye pressure)    Mild vitamin D deficiency    DDD (degenerative disc disease), cervical    CKD (chronic kidney disease) stage 3, GFR 30-59 ml/min    BPH (benign prostatic hyperplasia)    Long term current use of anticoagulant therapy    Diabetic peripheral neuropathy    Morbid obesity due to excess calories    Preop cardiovascular exam    Morbid obesity with BMI of 40.0-44.9, adult    Diabetic nephropathy    DM (diabetes mellitus), type 2, uncontrolled w/neurologic complication    Status post ablation of atrial flutter    Heart failure with preserved left ventricular function (HFpEF)    Chronic back pain    Paroxysmal atrial  fibrillation    Coronary artery disease involving native coronary artery without angina pectoris    Essential hypertension    S/P ablation of atrial fibrillation    Acquired hypothyroidism    Type II diabetes mellitus with manifestations, uncontrolled    Chronic neck pain    Spondylitis, cervical    Primary osteoarthritis of hand     Vinh Castro is a 66 y.o. male withElevated Hepatic Enzymes

## 2017-02-20 NOTE — MR AVS SNAPSHOT
[facility]  [address]  [facility] [address]  Phone: [phone]  Fax: [phone]                  Vinh Castro   2017 10:40 AM   Office Visit    Description:  Male : 1950   Provider:  Suzanne Nielson NP   Department:  [facility]           Reason for Visit     Elevated Hepatic Enzymes           Diagnoses this Visit        Comments    Elevated LFTs    -  Primary     Chronic hepatitis                To Do List           Future Appointments        Provider Department Dept Phone    3/24/2017 2:30 PM LARRY Chaney [facility] - Endocrinology [phone]    3/27/2017 2:00 PM Kerwin Flower PA-C [facility] - Gastroenterology [phone]    2017 3:40 PM MD Pb Argueta [facility]-Physical Med & Rehab [phone]      Goals (5 Years of Data)     None      [facility]     [facility] -  Assistance  Registered nurses in the [facility] provide clinical advisement, health education, appointment booking, and other advisory services.  Call for this free service at [phone].             Medications           Message regarding Medications     Verify the changes and/or additions to your medication regime listed below are the same as discussed with your clinician today.  If any of these changes or additions are incorrect, please notify your healthcare provider.        STOP taking these medications     ASPIRIN/ACETAMINOPHEN/CAFFEINE (EXCEDRIN EXTRA STRENGTH ORAL) Take by mouth as needed.           Verify that the below list of medications is an accurate representation of the medications you are currently taking.  If none reported, the list may be blank. If incorrect, please contact your healthcare provider. Carry this list with you in case of emergency.           Current Medications     alprazolam (XANAX) 0.5 MG tablet Take 0.5 mg by mouth daily as needed.     aspirin 81 MG Chew Take 81 mg by mouth once daily.     "blood sugar diagnostic (ACCU-CHEK JANEY PLUS TEST STRP) Strp Patient to check blood sugar 6 x's per day--Dx Code 250.62,  357.2    calcium carbonate-simethicone (MAALOX ADVANCED) 1,000-60 mg Chew Take 2 tablets by mouth 3 (three) times daily as needed.    cholecalciferol, vitamin D3, (VITAMIN D3) 5,000 unit Tab Take 5,000 Units by mouth. Takes one tablet every day except on Sun and Wed    ciprofloxacin HCl (CILOXAN) 0.3 % ophthalmic solution     CREON 24,000-76,000 -120,000 unit capsule TAKE 2 CAPSULES BY MOUTH THREE TIMES DAILY WITH MEALS, 2 WITH MEALS AND 1 WITH SNACK    DEXTROSE (GLUCOSE) Chew Take by mouth as needed.    diclofenac sodium (VOLTAREN) 1 % Gel Apply 2 g topically 4 (four) times daily. Apply to both hand    ELIQUIS 5 mg Tab TAKE 1 TABLET BY MOUTH TWICE DAILY    ferrous sulfate 325 mg (65 mg iron) Tab tablet TAKE 1 TABLET BY MOUTH EVERY DAY    fexofenadine (ALLEGRA) 180 MG tablet Take 180 mg by mouth once daily.    fluocinolone-shower cap 0.01 % Oil     fluticasone (FLONASE) 50 mcg/actuation nasal spray SHAKE WELL AND USE 2 SPRAYS IN EACH NOSTRIL EVERY DAY    furosemide (LASIX) 80 MG tablet Take 80 mg by mouth once daily.    gabapentin (NEURONTIN) 600 MG tablet Take 1 tablet (600 mg total) by mouth 4 (four) times daily with meals and nightly.    guaifenesin (MUCINEX) 600 mg 12 hr tablet Take 600 mg by mouth continuous prn for Congestion. Prn      HUMULIN R U-500, CONCENTRATED, 500 unit/mL Soln INJECT INTO SKIN 32 UNIT JORDY ON REGULAR U-100 SYRINGE BEFORE MEALS AND WITH PM SNACK    hydrocodone-acetaminophen 10-325mg (NORCO)  mg Tab Take 1 tablet by mouth 4 (four) times daily.    hydrocodone-acetaminophen 10-325mg (NORCO)  mg Tab Take 1 tablet by mouth 4 (four) times daily.    insulin needles, disposable, (BD ULTRA-FINE REJI PEN NEEDLES) 32 x 5/32 " Ndle Uses 5 daily, on multiple daily insulin injections    insulin syringe-needle U-100 1/2 mL 30 Syrg     insulin syringe-needle U-100 1/2 mL " 30 Syrg USE WITH INSULIN THREE TIMES DAILY TO FOUR TIMES DAILY    isosorbide mononitrate (IMDUR) 30 MG 24 hr tablet Take 1 tablet (30 mg total) by mouth every evening.    KRILL/OM3/DHA/EPA/OM6/LIP/ASTX (KRILL OIL, OMEGA 3 & 6, ORAL) Take 1 tablet by mouth every evening.     lancets (BD ULTRA FINE LANCETS) Misc 1 Units by Misc.(Non-Drug; Combo Route) route 4 (four) times daily.    levothyroxine (SYNTHROID) 25 MCG tablet Take 1 tablet (25 mcg total) by mouth once daily.    losartan (COZAAR) 50 MG tablet TAKE 1 TABLET BY MOUTH ONCE DAILY    metolazone (ZAROXOLYN) 5 MG tablet Take 1 tablet (5 mg total) by mouth daily as needed (Weight gain of 5 pounds or more).    metoprolol tartrate (LOPRESSOR) 50 MG tablet Take 1 tablet (50 mg total) by mouth 2 (two) times daily.    moxifloxacin (AVELOX) 400 mg tablet TK 1 T PO QD FOR 8 DAYS    MV-MN/FA/LYCOPENE/LUT/HB#178 (RAMEZ MULTIVITAMIN FOR MEN ORAL) Take by mouth.    NEVANAC 0.1 % ophthalmic suspension     nitroGLYCERIN (NITROSTAT) 0.4 MG SL tablet Place 1 tablet (0.4 mg total) under the tongue every 5 (five) minutes as needed.    ofloxacin (FLOXIN) 0.3 % otic solution Place 3 drops into both ears 2 (two) times daily.     ondansetron (ZOFRAN) 4 MG tablet Take 1 tablet (4 mg total) by mouth every 8 (eight) hours as needed for Nausea.    oxymetazoline (MUCINEX SINUS-MAX) 0.05 % nasal spray 2 sprays by Nasal route once daily.    pantoprazole (PROTONIX) 40 MG tablet TAKE 1 TABLET(40 MG) BY MOUTH TWICE DAILY    PATADAY 0.2 % Drop Place 1 drop into both eyes once daily.     potassium chloride SA (K-DUR,KLOR-CON) 20 MEQ tablet TAKE 1 TABLET(20 MEQ) BY MOUTH EVERY DAY    tamsulosin (FLOMAX) 0.4 mg Cp24 TAKE 1 CAPSULE BY MOUTH EVERY DAY    triamcinolone (NASACORT) 55 mcg nasal inhaler 2 sprays by Nasal route once daily.    triamcinolone acetonide 0.1% (KENALOG) 0.1 % cream     VITAMIN D2 50,000 unit capsule TAKE 1 CAPSULE BY MOUTH 2 TIMES A WEEK    zolpidem (AMBIEN) 5 MG Tab TAKE 1  "TABLET BY MOUTH EVERY EVENING           Clinical Reference Information           Your Vitals Were     BP Pulse Temp Resp Height Weight    127/57 (BP Location: Left arm, Patient Position: Sitting, BP Method: Automatic) 68 98.1 °F (36.7 °C) (Oral) 16 5' 8" (1.727 m) 118 kg (260 lb 2.3 oz)    SpO2 BMI             92% 39.55 kg/m2         Blood Pressure          Most Recent Value    BP  (!)  127/57      Allergies as of 2/20/2017     Cephalexin    Penicillins    Pravastatin    Sulfa (Sulfonamide Antibiotics)    Adhesive      Immunizations Administered on Date of Encounter - 2/20/2017     None      Orders Placed During Today's Visit     Future Labs/Procedures Expected by Expires    Alpha 1 Antitrypsin Phenotype  2/20/2017 4/21/2018    RENZO  2/20/2017 4/21/2018    Anti-smooth muscle antibody  2/20/2017 4/21/2018    Antimitochondrial antibody  2/20/2017 4/21/2018    Ceruloplasmin  2/20/2017 4/21/2018    Ferritin  2/20/2017 4/21/2018    HCV FibroSURE  2/20/2017 4/21/2018    Hepatitis A antibody, IgG  2/20/2017 4/21/2018    Hepatitis B core antibody, total  2/20/2017 2/20/2018    Hepatitis B surface antibody  2/20/2017 4/21/2018    IgG  2/20/2017 4/21/2018    Iron and TIBC  2/20/2017 4/21/2018    Recurring Lab Work Interval Expires    AFP tumor marker   2/21/2018    CBC auto differential   2/21/2018    Comprehensive metabolic panel   2/21/2018    Hepatic function panel  Every 16 weeks until 4/21/2018 4/21/2018    Protime-INR   2/21/2018    US Abdomen Complete   2/21/2018      Language Assistance Services     ATTENTION: Language assistance services are available, free of charge. Please call [phone].      ATENCIÓN: Si habla sumanth, tiene a wilkerson disposición servicios gratuitos de asistencia lingüística. Llame al [phone].     CHÚ Ý: N?u b?n nói Ti?ng Vi?t, có các d?ch v? h? tr? ngôn ng? mi?n phí dành cho b?n. G?i s? [phone].         [facility] complies with applicable Federal civil rights laws and " does not discriminate on the basis of race, color, national origin, age, disability, or sex.

## 2017-02-20 NOTE — LETTER
February 20, 2017      Shimon Salazar MD  [address] [facility] [address]           [facility] - Hepatology  [address]  [place] [address]  Phone: [phone]  Fax: [phone]          Patient: Vinh Castro   MR Number: [id number]   YOB: 1950   Date of Visit: 2/20/2017       Dear Dr. Shimon Salazar:    Thank you for referring Vinh Castro to me for evaluation. Attached you will find relevant portions of my assessment and plan of care.    If you have questions, please do not hesitate to call me. I look forward to following Vinh Castro along with you.    Sincerely,    Suzanne Nielson, HERMILA    Enclosure  CC:  No Recipients    If you would like to receive this communication electronically, please contact [email or web address] or [phone] to request more information on [email or web address] Link access.    For providers and/or their staff who would like to refer a patient to [facility], please contact us through our one-stop-shop provider referral line, [facility], at [phone].    If you feel you have received this communication in error or would no longer like to receive these types of communications, please e-mail [email or web address]

## 2017-02-20 NOTE — Clinical Note
Needs to update imaging, schedule US abd complete.  Done preferably before next visit can be same day

## 2017-02-21 ENCOUNTER — TELEPHONE (OUTPATIENT)
Dept: INTERNAL MEDICINE | Facility: CLINIC | Age: 67
End: 2017-02-21

## 2017-02-21 ENCOUNTER — TELEPHONE (OUTPATIENT)
Dept: HEPATOLOGY | Facility: CLINIC | Age: 67
End: 2017-02-21

## 2017-02-21 ENCOUNTER — TELEPHONE (OUTPATIENT)
Dept: GASTROENTEROLOGY | Facility: CLINIC | Age: 67
End: 2017-02-21

## 2017-02-21 LAB
ANA SER QL IF: NORMAL
MITOCHONDRIA AB TITR SER IF: NORMAL {TITER}
SMOOTH MUSCLE AB TITR SER IF: NORMAL {TITER}

## 2017-02-21 RX ORDER — ONDANSETRON 4 MG/1
TABLET, FILM COATED ORAL
Qty: 30 TABLET | Refills: 0 | Status: SHIPPED | OUTPATIENT
Start: 2017-02-21 | End: 2017-03-20 | Stop reason: SDUPTHER

## 2017-02-21 NOTE — TELEPHONE ENCOUNTER
MA spoke to pt and scheduled f/u and u/s same day for 3/24 with kourtney. Pt confirmed. appt reminders mailed. EMS

## 2017-02-21 NOTE — TELEPHONE ENCOUNTER
----- Message from Kaila Eason sent at 2/21/2017  8:48 AM CST -----  Contact: self - [phone]  Martin - is calling for test results - please call patient at

## 2017-02-21 NOTE — TELEPHONE ENCOUNTER
----- Message from Suad Pereira sent at 2/21/2017  8:39 AM CST -----  Contact: Self/ [phone]   Type: Test Results    What test was performed? Endoscopy    Who ordered the test? Dr. Salazar     When and where were the test performed? 02/06/2017     Comments: pt is calling to have the test results. Please call and advise     Thank you

## 2017-02-22 ENCOUNTER — TELEPHONE (OUTPATIENT)
Dept: GASTROENTEROLOGY | Facility: CLINIC | Age: 67
End: 2017-02-22

## 2017-02-22 DIAGNOSIS — K21.9 GASTROESOPHAGEAL REFLUX DISEASE WITHOUT ESOPHAGITIS: ICD-10-CM

## 2017-02-22 DIAGNOSIS — R68.81 EARLY SATIETY: ICD-10-CM

## 2017-02-22 DIAGNOSIS — R14.0 ABDOMINAL BLOATING: ICD-10-CM

## 2017-02-22 DIAGNOSIS — R11.0 NAUSEA: Primary | ICD-10-CM

## 2017-02-22 LAB
A1AT PHENOTYP SERPL-IMP: ABNORMAL BANDS
A1AT SERPL NEPH-MCNC: 193 MG/DL

## 2017-02-22 NOTE — TELEPHONE ENCOUNTER
"Results given to patient per Dr. Salazar. Patient verbalized understanding. He did receive the message, but he did not understand it. Patient wanted a call to discuss symptoms he is still experiencing.     Patient stomach feels swollen and the lining of his stomach fell like "acid". Patient is currently taking pain medicine (Norco) and nausea medication. Patient is taking Norco for a different reason, but it does relieve his abdominal pain.    Please advise, thanks.  "

## 2017-02-22 NOTE — TELEPHONE ENCOUNTER
----- Message from Ava Muñoz MA sent at 2/21/2017 10:27 AM CST -----  Dr Salazar   Pt calling for test results.  Ava

## 2017-02-22 NOTE — TELEPHONE ENCOUNTER
The results were reviewed a week ago and released to him through [employer].  It does not appear that he got that information.  Please see result note for the biopsy specimens.  We will contact him by phone with the results.

## 2017-02-23 ENCOUNTER — TELEPHONE (OUTPATIENT)
Dept: ELECTROPHYSIOLOGY | Facility: CLINIC | Age: 67
End: 2017-02-23

## 2017-02-23 LAB
A2 MACROGLOB SERPL-MCNC: 404 MG/DL (ref 106–279)
ALT SERPL W P-5'-P-CCNC: 74 U/L (ref 9–46)
APO A-I SERPL-MCNC: 137 MG/DL (ref 94–176)
BILIRUB SERPL-MCNC: 0.4 MG/DL (ref 0.2–1.2)
FIBROSIS STAGE SERPL QL: ABNORMAL
FIBROTEST INTERPRETATION: ABNORMAL
FOOTNOTE: ABNORMAL
GGT SERPL-CCNC: 131 U/L (ref 3–70)
HAPTOGLOB SERPL-MCNC: 138 MG/DL (ref 43–212)
LIVER FIBR SCORE SERPL CALC.FIBROSURE: 0.76
NECROINFLAMMAT INTERP: ABNORMAL
NECROINFLAMMATORY ACT GRADE SERPL QL: ABNORMAL
NECROINFLAMMATORY ACT SCORE SERPL: 0.61
REFERENCE ID: ABNORMAL

## 2017-02-23 NOTE — TELEPHONE ENCOUNTER
"Nurse called and spoke with MR Castro and informed him per Dr Galloway 'he can have mri with the loop in". Patient acknowledged and thanked nurse.  ----- Message from Derrick Galloway MD sent at 2/22/2017  1:13 PM CST -----  Contact: patient  He can have MRI with the loop in. Not a contraindication.  ----- Message -----     From: Adonay Peña RN     Sent: 2/21/2017   4:43 PM       To: Derrick Galloway MD    Doc   Patient informs me that loop has been in for long time and he would like it removed so he can have mri.   Would you like me to schedule loop removal??     ----- Message -----     From: Dominique Sifuentes MA     Sent: 2/21/2017   9:04 AM       To: Adonay Peña RN    Please advise.  ----- Message -----     From: Zeus Villegas     Sent: 2/21/2017   8:51 AM       To: [facility] Staff    Please call pt at [phone]. Need to discuss possibly of removing the heart device in order to get an MRI scan done    Thank you          "

## 2017-02-24 ENCOUNTER — TELEPHONE (OUTPATIENT)
Dept: ENDOCRINOLOGY | Facility: CLINIC | Age: 67
End: 2017-02-24

## 2017-02-24 NOTE — TELEPHONE ENCOUNTER
BG logs reviewed.   Pt requested return call to discuss logs.   BG AC/HS.  Readings mostly 200s though logs do indicate BG variability 100s- 300s, some higher, and + missed doses.    No hypoglycemia.   He notes that +missed doses contributing to BG fluctuations as well as dietary choices.     He is now setting alarm for doses and has modified diet and reports BGs have been at goal.  Declines dose adjustments and would like to work on dose adherence first.

## 2017-03-01 NOTE — TELEPHONE ENCOUNTER
Spoke with patient, CT scan scheduled and instructions reviewed with patient. Patient verbalized understanding.

## 2017-03-03 DIAGNOSIS — K86.81 EXOCRINE PANCREATIC INSUFFICIENCY: ICD-10-CM

## 2017-03-06 RX ORDER — PANCRELIPASE 24000; 76000; 120000 [USP'U]/1; [USP'U]/1; [USP'U]/1
CAPSULE, DELAYED RELEASE PELLETS ORAL
Qty: 210 CAPSULE | Refills: 3 | Status: SHIPPED | OUTPATIENT
Start: 2017-03-06 | End: 2017-08-23 | Stop reason: SDUPTHER

## 2017-03-07 ENCOUNTER — TELEPHONE (OUTPATIENT)
Dept: GASTROENTEROLOGY | Facility: CLINIC | Age: 67
End: 2017-03-07

## 2017-03-07 NOTE — TELEPHONE ENCOUNTER
Spoke with zayra in the ct dept and informed him humberto wants the test without contrast since patient have abnormal labs

## 2017-03-07 NOTE — TELEPHONE ENCOUNTER
----- Message from Kaila Eason sent at 3/7/2017  2:28 PM CST -----  Contact: [facility] - [phone]  Mundo - states patient's labs are bad - is it ok to give patient contrast - please call [facility] - [phone]

## 2017-03-08 RX ORDER — INSULIN HUMAN 500 [IU]/ML
INJECTION, SOLUTION SUBCUTANEOUS
Qty: 20 ML | Refills: 3 | Status: SHIPPED | OUTPATIENT
Start: 2017-03-08 | End: 2017-04-18 | Stop reason: SDUPTHER

## 2017-03-09 ENCOUNTER — PATIENT MESSAGE (OUTPATIENT)
Dept: GASTROENTEROLOGY | Facility: CLINIC | Age: 67
End: 2017-03-09

## 2017-03-10 DIAGNOSIS — Z86.79 ATRIAL FIBRILLATION, CURRENTLY IN SINUS RHYTHM: ICD-10-CM

## 2017-03-10 RX ORDER — APIXABAN 5 MG/1
TABLET, FILM COATED ORAL
Qty: 60 TABLET | Refills: 0 | Status: SHIPPED | OUTPATIENT
Start: 2017-03-10 | End: 2017-04-16 | Stop reason: SDUPTHER

## 2017-03-13 ENCOUNTER — PATIENT MESSAGE (OUTPATIENT)
Dept: INTERNAL MEDICINE | Facility: CLINIC | Age: 67
End: 2017-03-13

## 2017-03-13 ENCOUNTER — TELEPHONE (OUTPATIENT)
Dept: INTERNAL MEDICINE | Facility: CLINIC | Age: 67
End: 2017-03-13

## 2017-03-13 NOTE — TELEPHONE ENCOUNTER
Mr. Castro,  It is best you speak with MsRamya Arevalo in Hepatology  And GI to get appropriate detailed explanation of your test results. I advise you to make an appointment to discuss. My office can help you with these appts if you have a problem. Results need to be discussed with the specialists who ordered them for clarification.

## 2017-03-13 NOTE — TELEPHONE ENCOUNTER
Spoke to patient and states that he is not feeling well, nausea, indigestion, constipation, had bowel movement yesterday, not sleeping at night---patient had all kinds of test, endoscopy, CT and wants Dr. Thapa to call him with explanation---also look at all medications to see if any is duplicate, he is anxious-----pls call patient  Advised that he should contact the gastro department on who ordered the test for explanation but patient wants Dr. Thapa to call him

## 2017-03-13 NOTE — TELEPHONE ENCOUNTER
----- Message from Alberta Levine sent at 3/13/2017 10:28 AM CDT -----  Contact: pt [phone]  Pt would like call back regarding continuing stomach issues, constipation.

## 2017-03-13 NOTE — TELEPHONE ENCOUNTER
Please call patient:  I have sent him an e-mail.  It is best he speak with Ms. Arevalo in Hepatology and Ms. Flower in GI to get appropriate detailed explanation of his test results to his satifaction. I advised him to make an appointment with these providers to discuss. Please assist him with help making these appts with these appts if needed.  Results need to be discussed with the specialists who ordered them for clarification.

## 2017-03-14 ENCOUNTER — TELEPHONE (OUTPATIENT)
Dept: GASTROENTEROLOGY | Facility: CLINIC | Age: 67
End: 2017-03-14

## 2017-03-14 RX ORDER — ALPRAZOLAM 0.5 MG/1
0.5 TABLET ORAL DAILY PRN
Qty: 30 TABLET | Refills: 0 | Status: SHIPPED | OUTPATIENT
Start: 2017-03-14 | End: 2018-07-24

## 2017-03-14 NOTE — TELEPHONE ENCOUNTER
----- Message from Supriya Mauricio sent at 3/14/2017 11:18 AM CDT -----  Contact: self/[phone]  Pt called in regards to getting a Rx refill for alprazolam (XANAX) 0.5 MG tablet.        [facility]  Please advise

## 2017-03-15 ENCOUNTER — TELEPHONE (OUTPATIENT)
Dept: GASTROENTEROLOGY | Facility: CLINIC | Age: 67
End: 2017-03-15

## 2017-03-15 NOTE — TELEPHONE ENCOUNTER
----- Message from Kaila Jenaro sent at 3/15/2017  1:50 PM CDT -----  Contact: [phone] - self  marlene - returning your call - please call patient [phone]

## 2017-03-16 ENCOUNTER — TELEPHONE (OUTPATIENT)
Dept: ENDOSCOPY | Facility: HOSPITAL | Age: 67
End: 2017-03-16

## 2017-03-16 NOTE — TELEPHONE ENCOUNTER
----- Message from Jud Hubbard sent at 3/16/2017  1:16 PM CDT -----  Contact: Pt# [phone]  Pt states he was calling to speak to the nurse about scheduling his surgery.

## 2017-03-20 ENCOUNTER — PATIENT MESSAGE (OUTPATIENT)
Dept: INTERNAL MEDICINE | Facility: CLINIC | Age: 67
End: 2017-03-20

## 2017-03-20 RX ORDER — ONDANSETRON 4 MG/1
4 TABLET, FILM COATED ORAL EVERY 8 HOURS PRN
Qty: 30 TABLET | Refills: 0 | Status: SHIPPED | OUTPATIENT
Start: 2017-03-20 | End: 2017-05-01 | Stop reason: SDUPTHER

## 2017-03-22 ENCOUNTER — CLINICAL SUPPORT (OUTPATIENT)
Dept: ELECTROPHYSIOLOGY | Facility: CLINIC | Age: 67
End: 2017-03-22
Payer: MEDICARE

## 2017-03-22 DIAGNOSIS — Z95.818 STATUS POST PLACEMENT OF IMPLANTABLE LOOP RECORDER: ICD-10-CM

## 2017-03-22 DIAGNOSIS — R55 SYNCOPE: ICD-10-CM

## 2017-03-22 PROCEDURE — 93297 REM INTERROG DEV EVAL ICPMS: CPT | Mod: ,,, | Performed by: INTERNAL MEDICINE

## 2017-03-22 PROCEDURE — 93299 LOOP RECORDER REMOTE: CPT | Mod: PBBFAC | Performed by: INTERNAL MEDICINE

## 2017-03-24 ENCOUNTER — OFFICE VISIT (OUTPATIENT)
Dept: GASTROENTEROLOGY | Facility: CLINIC | Age: 67
End: 2017-03-24
Payer: MEDICARE

## 2017-03-24 ENCOUNTER — HOSPITAL ENCOUNTER (OUTPATIENT)
Dept: RADIOLOGY | Facility: HOSPITAL | Age: 67
Discharge: HOME | End: 2017-03-24
Attending: NURSE PRACTITIONER
Payer: MEDICARE

## 2017-03-24 ENCOUNTER — OFFICE VISIT (OUTPATIENT)
Dept: HEPATOLOGY | Facility: CLINIC | Age: 67
End: 2017-03-24
Payer: MEDICARE

## 2017-03-24 ENCOUNTER — OFFICE VISIT (OUTPATIENT)
Dept: ENDOCRINOLOGY | Facility: CLINIC | Age: 67
End: 2017-03-24
Payer: MEDICARE

## 2017-03-24 VITALS
BODY MASS INDEX: 39.29 KG/M2 | SYSTOLIC BLOOD PRESSURE: 156 MMHG | WEIGHT: 259.25 LBS | DIASTOLIC BLOOD PRESSURE: 62 MMHG | HEART RATE: 62 BPM | HEIGHT: 68 IN

## 2017-03-24 VITALS
HEART RATE: 61 BPM | TEMPERATURE: 98 F | HEIGHT: 68 IN | SYSTOLIC BLOOD PRESSURE: 143 MMHG | BODY MASS INDEX: 39.53 KG/M2 | DIASTOLIC BLOOD PRESSURE: 66 MMHG | RESPIRATION RATE: 18 BRPM | OXYGEN SATURATION: 95 % | WEIGHT: 260.81 LBS

## 2017-03-24 DIAGNOSIS — R79.89 ELEVATED LFTS: ICD-10-CM

## 2017-03-24 DIAGNOSIS — K31.89 PORTAL HYPERTENSIVE GASTROPATHY: ICD-10-CM

## 2017-03-24 DIAGNOSIS — K86.89 PANCREATIC INSUFFICIENCY: Primary | ICD-10-CM

## 2017-03-24 DIAGNOSIS — K76.6 PORTAL HYPERTENSIVE GASTROPATHY: ICD-10-CM

## 2017-03-24 DIAGNOSIS — K74.60 HEPATIC CIRRHOSIS, UNSPECIFIED HEPATIC CIRRHOSIS TYPE: Primary | ICD-10-CM

## 2017-03-24 DIAGNOSIS — N18.30 CHRONIC KIDNEY DISEASE, STAGE 3 (MODERATE): ICD-10-CM

## 2017-03-24 DIAGNOSIS — R11.0 CHRONIC NAUSEA: ICD-10-CM

## 2017-03-24 PROCEDURE — 99213 OFFICE O/P EST LOW 20 MIN: CPT | Mod: PBBFAC,27 | Performed by: NURSE PRACTITIONER

## 2017-03-24 PROCEDURE — 99213 OFFICE O/P EST LOW 20 MIN: CPT | Mod: S$PBB,,, | Performed by: PHYSICIAN ASSISTANT

## 2017-03-24 PROCEDURE — 99214 OFFICE O/P EST MOD 30 MIN: CPT | Mod: S$PBB,,, | Performed by: NURSE PRACTITIONER

## 2017-03-24 PROCEDURE — 99499 UNLISTED E&M SERVICE: CPT | Mod: S$PBB,,, | Performed by: NURSE PRACTITIONER

## 2017-03-24 PROCEDURE — 99999 PR PBB SHADOW E&M-EST. PATIENT-LVL V: CPT | Mod: PBBFAC,,, | Performed by: PHYSICIAN ASSISTANT

## 2017-03-24 PROCEDURE — 99999 PR PBB SHADOW E&M-EST. PATIENT-LVL V: CPT | Mod: PBBFAC,,, | Performed by: NURSE PRACTITIONER

## 2017-03-24 PROCEDURE — 99215 OFFICE O/P EST HI 40 MIN: CPT | Mod: PBBFAC,27 | Performed by: PHYSICIAN ASSISTANT

## 2017-03-24 PROCEDURE — 76700 US EXAM ABDOM COMPLETE: CPT | Mod: 26,,, | Performed by: RADIOLOGY

## 2017-03-24 PROCEDURE — 99999 PR PBB SHADOW E&M-EST. PATIENT-LVL III: CPT | Mod: PBBFAC,,, | Performed by: NURSE PRACTITIONER

## 2017-03-24 NOTE — PROGRESS NOTES
[facility] Consultation Note    Reason for Consult:  The primary encounter diagnosis was Pancreatic insufficiency. A diagnosis of Chronic kidney disease, stage 3 (moderate) was also pertinent to this visit.    PCP:   Natalee Thapa       Referring MD:  No referring provider defined for this encounter.    HPI:  This is a 66 y.o. male with PMH pancreatic insufficiency and DM here to follow up on his nausea.     His 2/2017 EGD revealed possible portal gastropathy, multiple gastric polyps, and a few duodenal polyps/ possible Brunner's gland polyps    His 2/2017 CT scan revealed possible cirrhosis and fatty pancreas    He continues to have nausea.  Duration - 4 months  Taking his creon as directed - 2 with a meal, one with a snack,  However he thinks what he has been considering a snack is more like a meal.     Having intermittent nausea, lasts for several hrs, he has not noticed a relation to  meals  Having epigastric soreness, moderate,   + bloating  +Burping and belching  + early satiety  Taking ASA 81mg without food   Also taking Norco 10mg QID for hip pain, says he would n ot be able to stop them for a GES  Take  1-2  Tablets of creon 4 times a day  S/p cholecystectomy  DM with intermittent Bs > 200. If he does not take his insulin BS can increase to 500.     Has noticed some difference since starting the protonix 40mg BID    ROS:  Constitutional: No fevers, chills, No weight loss  ENT: No allergies  CV: No chest pain  Pulm: No cough, No shortness of breath  Ophtho: No vision changes  GI: see HPI  Derm: No rash  Heme: No lymphadenopathy, No bruising  MSK: hip pain  : No dysuria, No hematuria  Endo: No hot or cold intolerance  Neuro: No syncope, No seizure  Psych: No anxiety, No depression    Medical History:  has a past medical history of *Atrial fibrillation; Acquired hypothyroidism (5/4/2016); Allergy; Anticoagulant long-term use; Arthritis; CAD (coronary artery disease); CHF  (congestive heart failure); Chronic anticoagulation; Coronary artery disease; Depression; Diabetic nephropathy; Diabetic retinopathy associated with type 2 diabetes mellitus; Diverticulosis; Glaucoma; History of BPH; History of peripheral vascular disease; Hyperlipidemia; Hypertension; Obesity; SEAN (obstructive sleep apnea); Personal history of kidney stones; PN (peripheral neuropathy); Syncope (5/23/2016); and Type 2 diabetes mellitus not at goal.    Surgical History:  has a past surgical history that includes Tonsillectomy; Gallbladder surgery; Hernia repair; Cataract extraction bilateral w/ anterior vitrectomy; Hand surgery; Coronary artery bypass graft (2001); Retinal laser procedure; Radiofrequency ablation (8/14); Coronary angioplasty (2014); Eye surgery; Skin biopsy; Joint replacement; and Abdominal surgery.    Family History: family history includes Cancer in his father and mother; Diabetes in his father and paternal grandfather; Heart attack in his father; Heart disease in his father; Obesity in his sister. There is no history of Anesthesia problems..     Social History:  reports that he has never smoked. He has never used smokeless tobacco. He reports that he does not drink alcohol or use illicit drugs.    Review of patient's allergies indicates:   Allergen Reactions    Cephalexin Hives and Shortness Of Breath    Penicillins Other (See Comments)     Unknown  Hardened skin    Pravastatin Other (See Comments)     myalgia    Sulfa (sulfonamide antibiotics)      Other reaction(s): Unknown       Current Outpatient Prescriptions on File Prior to Visit   Medication Sig Dispense Refill    alprazolam (XANAX) 0.5 MG tablet Take 1 tablet (0.5 mg total) by mouth daily as needed. 30 tablet 0    aspirin 81 MG Chew Take 81 mg by mouth once daily.      blood sugar diagnostic (ACCU-CHEK JANEY PLUS TEST STRP) Strp Patient to check blood sugar 6 x's per day--Dx Code 250.62,  357.2 600 each 1    calcium  "carbonate-simethicone (MAALOX ADVANCED) 1,000-60 mg Chew Take 2 tablets by mouth 3 (three) times daily as needed.      cholecalciferol, vitamin D3, (VITAMIN D3) 5,000 unit Tab Take 5,000 Units by mouth. Takes one tablet every day except on Sun and Wed      ciprofloxacin HCl (CILOXAN) 0.3 % ophthalmic solution       CREON 24,000-76,000 -120,000 unit capsule TAKE 2 CAPSULES BY MOUTH THREE TIMES DAILY WITH MEALS AND 1 CAPSULE WITH A SNACK 210 capsule 3    DEXTROSE (GLUCOSE) Chew Take by mouth as needed.      ELIQUIS 5 mg Tab TAKE 1 TABLET BY MOUTH TWICE DAILY 60 tablet 0    ferrous sulfate 325 mg (65 mg iron) Tab tablet TAKE 1 TABLET BY MOUTH EVERY DAY 90 tablet 0    fexofenadine (ALLEGRA) 180 MG tablet Take 180 mg by mouth once daily.      fluocinolone-shower cap 0.01 % Oil       fluticasone (FLONASE) 50 mcg/actuation nasal spray SHAKE WELL AND USE 2 SPRAYS IN EACH NOSTRIL EVERY DAY 1 Bottle 5    furosemide (LASIX) 80 MG tablet Take 80 mg by mouth once daily.      guaifenesin (MUCINEX) 600 mg 12 hr tablet Take 600 mg by mouth continuous prn for Congestion. Prn        HUMULIN R U-500, CONCENTRATED, 500 unit/mL Soln INJECT INTO SKIN 32 UNIT JORDY ON REGULAR U-100 SYRINGE BEFORE MEALS AND WITH EVENING SNACK 20 mL 3    insulin needles, disposable, (BD ULTRA-FINE REJI PEN NEEDLES) 32 x 5/32 " Ndle Uses 5 daily, on multiple daily insulin injections 150 each 1    insulin syringe-needle U-100 1/2 mL 30 Syrg       insulin syringe-needle U-100 1/2 mL 30 Syrg USE WITH INSULIN THREE TIMES DAILY TO FOUR TIMES DAILY 400 each 2    isosorbide mononitrate (IMDUR) 30 MG 24 hr tablet Take 1 tablet (30 mg total) by mouth every evening. 30 tablet 11    KRILL/OM3/DHA/EPA/OM6/LIP/ASTX (KRILL OIL, OMEGA 3 & 6, ORAL) Take 1 tablet by mouth every evening.       lancets (BD ULTRA FINE LANCETS) Misc 1 Units by Misc.(Non-Drug; Combo Route) route 4 (four) times daily. 200 each 11    levothyroxine (SYNTHROID) 25 MCG tablet Take 1 " tablet (25 mcg total) by mouth once daily. 30 tablet 11    losartan (COZAAR) 50 MG tablet TAKE 1 TABLET BY MOUTH ONCE DAILY 90 tablet 3    metolazone (ZAROXOLYN) 5 MG tablet Take 1 tablet (5 mg total) by mouth daily as needed (Weight gain of 5 pounds or more). 30 tablet 6    metoprolol tartrate (LOPRESSOR) 50 MG tablet Take 1 tablet (50 mg total) by mouth 2 (two) times daily. 60 tablet 11    moxifloxacin (AVELOX) 400 mg tablet TK 1 T PO QD FOR 8 DAYS  0    MV-MN/FA/LYCOPENE/LUT/HB#178 (RAMEZ MULTIVITAMIN FOR MEN ORAL) Take by mouth.      NEVANAC 0.1 % ophthalmic suspension       nitroGLYCERIN (NITROSTAT) 0.4 MG SL tablet Place 1 tablet (0.4 mg total) under the tongue every 5 (five) minutes as needed. 25 tablet 6    ofloxacin (FLOXIN) 0.3 % otic solution Place 3 drops into both ears 2 (two) times daily.       ondansetron (ZOFRAN) 4 MG tablet Take 1 tablet (4 mg total) by mouth every 8 (eight) hours as needed for Nausea. 30 tablet 0    oxymetazoline (MUCINEX SINUS-MAX) 0.05 % nasal spray 2 sprays by Nasal route once daily.      pantoprazole (PROTONIX) 40 MG tablet TAKE 1 TABLET(40 MG) BY MOUTH TWICE DAILY 60 tablet 3    PATADAY 0.2 % Drop Place 1 drop into both eyes once daily.       potassium chloride SA (K-DUR,KLOR-CON) 20 MEQ tablet TAKE 1 TABLET(20 MEQ) BY MOUTH EVERY DAY 30 tablet 9    tamsulosin (FLOMAX) 0.4 mg Cp24 TAKE 1 CAPSULE BY MOUTH EVERY DAY 90 capsule 5    triamcinolone (NASACORT) 55 mcg nasal inhaler 2 sprays by Nasal route once daily.      triamcinolone acetonide 0.1% (KENALOG) 0.1 % cream       VITAMIN D2 50,000 unit capsule TAKE 1 CAPSULE BY MOUTH 2 TIMES A WEEK 24 capsule 2    zolpidem (AMBIEN) 5 MG Tab TAKE 1 TABLET BY MOUTH EVERY EVENING 30 tablet 0    diclofenac sodium (VOLTAREN) 1 % Gel Apply 2 g topically 4 (four) times daily. Apply to both hand 3 Tube 2    gabapentin (NEURONTIN) 600 MG tablet Take 1 tablet (600 mg total) by mouth 4 (four) times daily with meals and nightly.  "360 tablet 2    hydrocodone-acetaminophen 10-325mg (NORCO)  mg Tab Take 1 tablet by mouth 4 (four) times daily. 120 tablet 0     No current facility-administered medications on file prior to visit.          Objective Findings:    Vital Signs:  BP (!) 156/62  Pulse 62  Ht 5' 8" (1.727 m)  Wt 117.6 kg (259 lb 4.2 oz)  BMI 39.42 kg/m2  Body mass index is 39.42 kg/(m^2).    Physical Exam:  General Appearance: Well appearing in no acute distress   Head:   Normocephalic, without obvious abnormality  Eyes:    No scleral icterus  ENT: Neck supple, Lips, mucosa, and tongue normal  Lungs: CTA bilaterally in anterior and posterior fields, no wheezes, no crackles.  Heart:  Regular rate and rhythm, S1, S2 normal, no murmurs heard  Abdomen: Soft, moderate epigastric and LUQ, non-distended with positive bowel sounds in all four quadrants.   Extremities: 2+ pulses, no edema  Skin: No rash  Neurologic: AAO x 3      Labs:  Lab Results   Component Value Date    WBC 7.08 02/20/2017    HGB 11.8 (L) 02/20/2017    HCT 34.6 (L) 02/20/2017     02/20/2017    CHOL 205 (H) 04/30/2016    TRIG 248 (H) 04/30/2016    HDL 29 (L) 04/30/2016    ALT 90 (H) 02/20/2017    ALT 90 (H) 02/20/2017    AST 87 (H) 02/20/2017    AST 87 (H) 02/20/2017     02/20/2017     02/20/2017     02/20/2017    K 4.7 02/20/2017    K 4.7 02/20/2017    K 4.7 02/20/2017     02/20/2017     02/20/2017     02/20/2017    CREATININE 1.7 (H) 02/20/2017    CREATININE 1.7 (H) 02/20/2017    CREATININE 1.7 (H) 02/20/2017    BUN 28 (H) 02/20/2017    BUN 28 (H) 02/20/2017    BUN 28 (H) 02/20/2017    CO2 27 02/20/2017    CO2 27 02/20/2017    CO2 27 02/20/2017    TSH 0.724 01/24/2017    PSA 0.17 07/10/2013    INR 1.0 02/20/2017    HGBA1C 11.1 (H) 02/14/2017       Imaging:  His 2/2017 CT scan revealed possible cirrhosis and fatty pancreas    Endoscopy:    2/2015 EUS - pancreatic parechyma abnormalities, pancreaus divisum.  2014 EGD - " gastric and duodenal polyps, esophagitis    His 2/2017 EGD revealed possible portal gastropathy, multiple gastric polyps, and a few duodenal polyps/ possible Brunner's gland polyps    Assessment:  1. Pancreatic insufficiency    2. Chronic kidney disease, stage 3 (moderate)    ASA 81mg use    Nausea x 4 months.Likely related to  include possible gastroparesis from DM and narcotics and pancreatic insufficiency       Recommendations:  1. Spoke with pt about a pancreatic insufficiency diet: 20g of fat per day. 64oz of water daily. Several small meals throughout the day.    2. Continue creon as directed    3. Gastroparesis diet. Will not order GES since he says he can not stop his Norco for 48hrs     4. referal made to nephrology at pt request    No Follow-up on file. f/u with pancreas specialist      Order summary:  Orders Placed This Encounter    Ambulatory Referral to Nephrology         Thank you so much for allowing me to participate in the care of Vinh Flower PA-C

## 2017-03-24 NOTE — PROGRESS NOTES
HEPATOLOGY CONSULTATION    Referring Physician: Shimon Salazar MD   Current Corresponding Physician: Shimon Salazar MD     Reason for Consultation: Consultation for evaluation of Follow-up    History of Present Illness: Vinh Castro is a 66 y.o. male who presents for evaluation of   Chief Complaint   Patient presents with    Follow-up     Per patient was told he had fatty liver 10 + years ago. He has additional PMH of DM uncontrolled, HTN, HLD, CKD, obesity w/ BMI 39.   Liver w/u serologies negative for A1AT, Scot's, HH, AIH  He has immunity to hepatitis A but not for B  Hepatitis C is negative  Patient with cirrhosis likely 2/2 to PARRISH and supported by presence Fibrosure: F4  EGD: PHTNG  US/CT: nodular liver w/ splenomegaly       EGD 2/2017: normal esophagus, congested and erythematous mucosa in the gastric fundus and gastric body, suspicious for PHTNG    US 3/24: Hepatosplenomegaly.     CT 3/7: nodular liver, normal spleen, near complete fatty replacement of the pancreas    CT 2014: normal liver, normal biliary ducts, pancreas replaced by fat,     Other noted hx:DM uncontrolled, CKD,  HLD, ED, GERD diabetic retinopathy, PAD, glaucoma, DDD, BPH, BMI 39, CAD w/ hx AFib   Past Medical History:   Diagnosis Date    *Atrial fibrillation     Acquired hypothyroidism 5/4/2016    Allergy     Anticoagulant long-term use     Arthritis     CAD (coronary artery disease)     CABG 2002, PCI    CHF (congestive heart failure)     Chronic anticoagulation     Coronary artery disease     Depression     Diabetic nephropathy     Diabetic retinopathy associated with type 2 diabetes mellitus     Diverticulosis     Glaucoma     patient denies having glaucoma    History of BPH     History of peripheral vascular disease     Bilateral Carotid Artery Stenosis < 40% on Carotid US 7/2012    Hyperlipidemia     Hypertension     Obesity     SEAN (obstructive sleep apnea)     uses C-PAP    Personal history of kidney stones  "    PN (peripheral neuropathy)     Syncope 5/23/2016    Type 2 diabetes mellitus not at goal      Outpatient Encounter Prescriptions as of 3/24/2017   Medication Sig Dispense Refill    alprazolam (XANAX) 0.5 MG tablet Take 1 tablet (0.5 mg total) by mouth daily as needed. 30 tablet 0    aspirin 81 MG Chew Take 81 mg by mouth once daily.      blood sugar diagnostic (ACCU-CHEK JANEY PLUS TEST STRP) Strp Patient to check blood sugar 6 x's per day--Dx Code 250.62,  357.2 600 each 1    calcium carbonate-simethicone (MAALOX ADVANCED) 1,000-60 mg Chew Take 2 tablets by mouth 3 (three) times daily as needed.      cholecalciferol, vitamin D3, (VITAMIN D3) 5,000 unit Tab Take 5,000 Units by mouth. Takes one tablet every day except on Sun and Wed      ciprofloxacin HCl (CILOXAN) 0.3 % ophthalmic solution       CREON 24,000-76,000 -120,000 unit capsule TAKE 2 CAPSULES BY MOUTH THREE TIMES DAILY WITH MEALS AND 1 CAPSULE WITH A SNACK 210 capsule 3    DEXTROSE (GLUCOSE) Chew Take by mouth as needed.      ELIQUIS 5 mg Tab TAKE 1 TABLET BY MOUTH TWICE DAILY 60 tablet 0    ferrous sulfate 325 mg (65 mg iron) Tab tablet TAKE 1 TABLET BY MOUTH EVERY DAY 90 tablet 0    fexofenadine (ALLEGRA) 180 MG tablet Take 180 mg by mouth once daily.      fluocinolone-shower cap 0.01 % Oil       fluticasone (FLONASE) 50 mcg/actuation nasal spray SHAKE WELL AND USE 2 SPRAYS IN EACH NOSTRIL EVERY DAY 1 Bottle 5    furosemide (LASIX) 80 MG tablet Take 80 mg by mouth once daily.      guaifenesin (MUCINEX) 600 mg 12 hr tablet Take 600 mg by mouth continuous prn for Congestion. Prn        HUMULIN R U-500, CONCENTRATED, 500 unit/mL Soln INJECT INTO SKIN 32 UNIT JORDY ON REGULAR U-100 SYRINGE BEFORE MEALS AND WITH EVENING SNACK 20 mL 3    insulin needles, disposable, (BD ULTRA-FINE REJI PEN NEEDLES) 32 x 5/32 " Ndle Uses 5 daily, on multiple daily insulin injections 150 each 1    insulin syringe-needle U-100 1/2 mL 30 Syrg       insulin " syringe-needle U-100 1/2 mL 30 Syrg USE WITH INSULIN THREE TIMES DAILY TO FOUR TIMES DAILY 400 each 2    isosorbide mononitrate (IMDUR) 30 MG 24 hr tablet Take 1 tablet (30 mg total) by mouth every evening. 30 tablet 11    KRILL/OM3/DHA/EPA/OM6/LIP/ASTX (KRILL OIL, OMEGA 3 & 6, ORAL) Take 1 tablet by mouth every evening.       lancets (BD ULTRA FINE LANCETS) Misc 1 Units by Misc.(Non-Drug; Combo Route) route 4 (four) times daily. 200 each 11    levothyroxine (SYNTHROID) 25 MCG tablet Take 1 tablet (25 mcg total) by mouth once daily. 30 tablet 11    losartan (COZAAR) 50 MG tablet TAKE 1 TABLET BY MOUTH ONCE DAILY 90 tablet 3    metolazone (ZAROXOLYN) 5 MG tablet Take 1 tablet (5 mg total) by mouth daily as needed (Weight gain of 5 pounds or more). 30 tablet 6    metoprolol tartrate (LOPRESSOR) 50 MG tablet Take 1 tablet (50 mg total) by mouth 2 (two) times daily. 60 tablet 11    moxifloxacin (AVELOX) 400 mg tablet TK 1 T PO QD FOR 8 DAYS  0    MV-MN/FA/LYCOPENE/LUT/HB#178 (RAMEZ MULTIVITAMIN FOR MEN ORAL) Take by mouth.      NEVANAC 0.1 % ophthalmic suspension       nitroGLYCERIN (NITROSTAT) 0.4 MG SL tablet Place 1 tablet (0.4 mg total) under the tongue every 5 (five) minutes as needed. 25 tablet 6    ofloxacin (FLOXIN) 0.3 % otic solution Place 3 drops into both ears 2 (two) times daily.       ondansetron (ZOFRAN) 4 MG tablet Take 1 tablet (4 mg total) by mouth every 8 (eight) hours as needed for Nausea. 30 tablet 0    oxymetazoline (MUCINEX SINUS-MAX) 0.05 % nasal spray 2 sprays by Nasal route once daily.      pantoprazole (PROTONIX) 40 MG tablet TAKE 1 TABLET(40 MG) BY MOUTH TWICE DAILY 60 tablet 3    PATADAY 0.2 % Drop Place 1 drop into both eyes once daily.       potassium chloride SA (K-DUR,KLOR-CON) 20 MEQ tablet TAKE 1 TABLET(20 MEQ) BY MOUTH EVERY DAY 30 tablet 9    tamsulosin (FLOMAX) 0.4 mg Cp24 TAKE 1 CAPSULE BY MOUTH EVERY DAY 90 capsule 5    triamcinolone (NASACORT) 55 mcg nasal  inhaler 2 sprays by Nasal route once daily.      triamcinolone acetonide 0.1% (KENALOG) 0.1 % cream       VITAMIN D2 50,000 unit capsule TAKE 1 CAPSULE BY MOUTH 2 TIMES A WEEK 24 capsule 2    zolpidem (AMBIEN) 5 MG Tab TAKE 1 TABLET BY MOUTH EVERY EVENING 30 tablet 0    diclofenac sodium (VOLTAREN) 1 % Gel Apply 2 g topically 4 (four) times daily. Apply to both hand 3 Tube 2    gabapentin (NEURONTIN) 600 MG tablet Take 1 tablet (600 mg total) by mouth 4 (four) times daily with meals and nightly. 360 tablet 2    hydrocodone-acetaminophen 10-325mg (NORCO)  mg Tab Take 1 tablet by mouth 4 (four) times daily. 120 tablet 0    [DISCONTINUED] alprazolam (XANAX) 0.5 MG tablet Take 0.5 mg by mouth daily as needed.       [DISCONTINUED] ELIQUIS 5 mg Tab TAKE 1 TABLET BY MOUTH TWICE DAILY 60 tablet 0    [DISCONTINUED] HUMULIN R U-500, CONCENTRATED, 500 unit/mL Soln INJECT INTO SKIN 32 UNIT JORDY ON REGULAR U-100 SYRINGE BEFORE MEALS AND WITH PM SNACK 20 mL 3    [DISCONTINUED] ondansetron (ZOFRAN) 4 MG tablet TAKE 1 TABLET(4 MG) BY MOUTH EVERY 8 HOURS AS NEEDED FOR NAUSEA 30 tablet 0     No facility-administered encounter medications on file as of 3/24/2017.      Review of patient's allergies indicates:   Allergen Reactions    Cephalexin Hives and Shortness Of Breath    Penicillins Other (See Comments)     Unknown  Hardened skin    Pravastatin Other (See Comments)     myalgia    Sulfa (sulfonamide antibiotics)      Other reaction(s): Unknown    Adhesive Rash     Family History   Problem Relation Age of Onset    Cancer Mother      Rhumatoid lung disease    Diabetes Father     Heart attack Father     Heart disease Father     Cancer Father      Lymphona    Obesity Sister     Diabetes Paternal Grandfather     Anesthesia problems Neg Hx        Social History     Social History    Marital status:      Spouse name: Sandra    Number of children: 1    Years of education: N/A     Occupational History     Not on file.     Social History Main Topics    Smoking status: Never Smoker    Smokeless tobacco: Never Used    Alcohol use No    Drug use: No    Sexual activity: No     Other Topics Concern    Not on file     Social History Narrative    Work: Manager at [employer] TripPhysical activity: None.Hobbies: Fishing.     Review of Systems   Constitutional: Negative for activity change, appetite change, chills, diaphoresis, fatigue, fever and unexpected weight change.   HENT: Negative for facial swelling.    Respiratory: Negative for cough, chest tightness and shortness of breath.    Cardiovascular: Negative for chest pain, palpitations and leg swelling.   Gastrointestinal: Negative for abdominal distention, abdominal pain, blood in stool, constipation, diarrhea, nausea and vomiting.   Musculoskeletal: Negative.  Negative for neck pain and neck stiffness.   Skin: Negative for color change, rash and wound.   Neurological: Negative for dizziness, tremors, weakness and light-headedness.   Hematological: Negative for adenopathy. Does not bruise/bleed easily.   Psychiatric/Behavioral: Negative for agitation and decreased concentration. The patient is not nervous/anxious.      Vitals:    03/24/17 1329   BP: (!) 143/66   Pulse: 61   Resp: 18   Temp: 98.2 °F (36.8 °C)       Physical Exam   Constitutional: He is oriented to person, place, and time. He appears well-developed and well-nourished. No distress.   HENT:   Head: Normocephalic and atraumatic.   Eyes: Conjunctivae are normal. Pupils are equal, round, and reactive to light. No scleral icterus.   Neck: Normal range of motion. Neck supple.   Cardiovascular: Normal rate.    Pulmonary/Chest: Effort normal.   Abdominal: Soft. He exhibits no distension and no mass. There is no tenderness. There is no rebound and no guarding.   Musculoskeletal: Normal range of motion.   Trace edema to BLEs   Neurological: He is alert and oriented to person, place, and time. No cranial nerve deficit.  He exhibits normal muscle tone. Coordination normal.   No asterixis   Skin: Skin is warm and dry. No rash noted. He is not diaphoretic. No erythema.   Psychiatric: He has a normal mood and affect. His behavior is normal. Judgment and thought content normal.       MELD-Na score: 12 at 2/20/2017 12:02 PM  MELD score: 12 at 2/20/2017 12:02 PM  Calculated from:  Serum Creatinine: 1.7 mg/dL at 2/20/2017 12:02 PM  Serum Sodium: 137 mmol/L at 2/20/2017 12:02 PM  Total Bilirubin: .4 mg/dL (Rounded to 1) at 2/20/2017 12:02 PM  INR(ratio): 1.0 at 2/20/2017 12:02 PM  Age: 66 years    Lab Results   Component Value Date     (H) 02/20/2017     (H) 02/20/2017     (H) 02/20/2017    BUN 28 (H) 02/20/2017    BUN 28 (H) 02/20/2017    BUN 28 (H) 02/20/2017    CREATININE 1.7 (H) 02/20/2017    CREATININE 1.7 (H) 02/20/2017    CREATININE 1.7 (H) 02/20/2017    CALCIUM 9.7 02/20/2017    CALCIUM 9.7 02/20/2017    CALCIUM 9.7 02/20/2017     02/20/2017     02/20/2017     02/20/2017    K 4.7 02/20/2017    K 4.7 02/20/2017    K 4.7 02/20/2017     02/20/2017     02/20/2017     02/20/2017    PROT 8.1 02/20/2017    PROT 8.1 02/20/2017    CO2 27 02/20/2017    CO2 27 02/20/2017    CO2 27 02/20/2017    ANIONGAP 9 02/20/2017    ANIONGAP 9 02/20/2017    ANIONGAP 9 02/20/2017    WBC 7.08 02/20/2017    RBC 3.54 (L) 02/20/2017    HGB 11.8 (L) 02/20/2017    HCT 34.6 (L) 02/20/2017    HCT 24 (L) 08/27/2014    MCV 98 02/20/2017    MCH 33.3 (H) 02/20/2017    MCHC 34.1 02/20/2017     Lab Results   Component Value Date    RDW 13.5 02/20/2017     02/20/2017    MPV 9.6 02/20/2017    GRAN 4.7 02/20/2017    GRAN 66.9 02/20/2017    LYMPH 1.3 02/20/2017    LYMPH 18.2 02/20/2017    MONO 0.8 02/20/2017    MONO 10.7 02/20/2017    EOSINOPHIL 3.0 02/20/2017    BASOPHIL 0.4 02/20/2017    EOS 0.2 02/20/2017    BASO 0.03 02/20/2017    APTT 41.9 (H) 08/24/2015    GROUPTRH B POS 08/24/2015     (H) 05/23/2016     CHOL 205 (H) 04/30/2016    TRIG 248 (H) 04/30/2016    HDL 29 (L) 04/30/2016    CHOLHDL 14.1 (L) 04/30/2016    TOTALCHOLEST 7.1 (H) 04/30/2016    ALBUMIN 3.3 (L) 02/20/2017    ALBUMIN 3.3 (L) 02/20/2017    ALBUMIN 3.3 (L) 02/20/2017    BILIDIR 0.2 02/20/2017    AST 87 (H) 02/20/2017    AST 87 (H) 02/20/2017    ALT 90 (H) 02/20/2017    ALT 90 (H) 02/20/2017    ALKPHOS 105 02/20/2017    ALKPHOS 105 02/20/2017    MG 2.2 12/17/2014    LABPROT 11.0 02/20/2017    INR 1.0 02/20/2017    PSA 0.17 07/10/2013       Assessment and Plan:  Liver cirrhosis: presumed PARRISH, well compensated w/ a MELD 12  -no immediate survival benefit to liver transplant  -recommend diet and exercise for the goal or weight loss  -HCC surveillance: next due August  -EV surveillance: EGD next due 2/2019  -positive immunity to hep A, recommend B vaccine  CKD: patient needs to establish with Nephrology    RTC 6 months w/ pre visit labs/US    Thank you for allowing me participate in this patient's care.   Patient Active Problem List   Diagnosis    SEAN (obstructive sleep apnea)    Hyperlipidemia    Erectile dysfunction    Peyronie's disease    GERD (gastroesophageal reflux disease)    DR (diabetic retinopathy)    History of colonic polyps    PAD (peripheral artery disease)    Chronic allergic rhinitis    Depression    Steatosis of liver    Glaucoma (increased eye pressure)    Mild vitamin D deficiency    DDD (degenerative disc disease), cervical    CKD (chronic kidney disease) stage 3, GFR 30-59 ml/min    BPH (benign prostatic hyperplasia)    Long term current use of anticoagulant therapy    Diabetic peripheral neuropathy    Morbid obesity due to excess calories    Preop cardiovascular exam    Morbid obesity with BMI of 40.0-44.9, adult    Diabetic nephropathy    DM (diabetes mellitus), type 2, uncontrolled w/neurologic complication    Status post ablation of atrial flutter    Heart failure with preserved left ventricular  function (HFpEF)    Chronic back pain    Paroxysmal atrial fibrillation    Coronary artery disease involving native coronary artery without angina pectoris    Essential hypertension    S/P ablation of atrial fibrillation    Acquired hypothyroidism    Type II diabetes mellitus with manifestations, uncontrolled    Chronic neck pain    Spondylitis, cervical    Primary osteoarthritis of hand    Elevated LFTs    Portal hypertensive gastropathy    Hepatic cirrhosis     Vinh Castro is a 66 y.o. male withFollow-up

## 2017-03-24 NOTE — MR AVS SNAPSHOT
[facility]  [address]  [facility] [address]  Phone: [phone]  Fax: [phone]                  Vinh Castro   3/24/2017 1:40 PM   Office Visit    Description:  Male : 1950   Provider:  Suzanne Nielson NP   Department:  [facility]           Reason for Visit     Follow-up           Diagnoses this Visit        Comments    Hepatic cirrhosis, unspecified hepatic cirrhosis type    -  Primary     Portal hypertensive gastropathy                To Do List           Future Appointments        Provider Department Dept Phone    3/24/2017 3:00 PM Kerwin Flower PA-C [facility] [phone]    2017 10:00 AM Samara Chris MD [facility] [phone]    2017 1:20 PM Rhea Gaviria MD [facility] [phone]      Goals (5 Years of Data)     None      Follow-Up and Disposition     Follow-up and Disposition History      [facility]     [facility]  Assistance  Registered nurses in the [facility] provide clinical advisement, health education, appointment booking, and other advisory services.  Call for this free service at [phone].             Medications           Message regarding Medications     Verify the changes and/or additions to your medication regime listed below are the same as discussed with your clinician today.  If any of these changes or additions are incorrect, please notify your healthcare provider.             Verify that the below list of medications is an accurate representation of the medications you are currently taking.  If none reported, the list may be blank. If incorrect, please contact your healthcare provider. Carry this list with you in case of emergency.           Current Medications     alprazolam (XANAX) 0.5 MG tablet Take 1 tablet (0.5 mg total) by mouth daily as needed.    aspirin 81 MG Chew Take 81 mg by mouth once daily.     "blood sugar diagnostic (ACCU-CHEK JANEY PLUS TEST STRP) Strp Patient to check blood sugar 6 x's per day--Dx Code 250.62,  357.2    calcium carbonate-simethicone (MAALOX ADVANCED) 1,000-60 mg Chew Take 2 tablets by mouth 3 (three) times daily as needed.    cholecalciferol, vitamin D3, (VITAMIN D3) 5,000 unit Tab Take 5,000 Units by mouth. Takes one tablet every day except on Sun and Wed    ciprofloxacin HCl (CILOXAN) 0.3 % ophthalmic solution     CREON 24,000-76,000 -120,000 unit capsule TAKE 2 CAPSULES BY MOUTH THREE TIMES DAILY WITH MEALS AND 1 CAPSULE WITH A SNACK    DEXTROSE (GLUCOSE) Chew Take by mouth as needed.    diclofenac sodium (VOLTAREN) 1 % Gel Apply 2 g topically 4 (four) times daily. Apply to both hand    ELIQUIS 5 mg Tab TAKE 1 TABLET BY MOUTH TWICE DAILY    ferrous sulfate 325 mg (65 mg iron) Tab tablet TAKE 1 TABLET BY MOUTH EVERY DAY    fexofenadine (ALLEGRA) 180 MG tablet Take 180 mg by mouth once daily.    fluocinolone-shower cap 0.01 % Oil     fluticasone (FLONASE) 50 mcg/actuation nasal spray SHAKE WELL AND USE 2 SPRAYS IN EACH NOSTRIL EVERY DAY    furosemide (LASIX) 80 MG tablet Take 80 mg by mouth once daily.    gabapentin (NEURONTIN) 600 MG tablet Take 1 tablet (600 mg total) by mouth 4 (four) times daily with meals and nightly.    guaifenesin (MUCINEX) 600 mg 12 hr tablet Take 600 mg by mouth continuous prn for Congestion. Prn      HUMULIN R U-500, CONCENTRATED, 500 unit/mL Soln INJECT INTO SKIN 32 UNIT JORDY ON REGULAR U-100 SYRINGE BEFORE MEALS AND WITH EVENING SNACK    hydrocodone-acetaminophen 10-325mg (NORCO)  mg Tab Take 1 tablet by mouth 4 (four) times daily.    insulin needles, disposable, (BD ULTRA-FINE REJI PEN NEEDLES) 32 x 5/32 " Ndle Uses 5 daily, on multiple daily insulin injections    insulin syringe-needle U-100 1/2 mL 30 Syrg     insulin syringe-needle U-100 1/2 mL 30 Syrg USE WITH INSULIN THREE TIMES DAILY TO FOUR TIMES DAILY    isosorbide mononitrate (IMDUR) 30 MG 24 " hr tablet Take 1 tablet (30 mg total) by mouth every evening.    KRILL/OM3/DHA/EPA/OM6/LIP/ASTX (KRILL OIL, OMEGA 3 & 6, ORAL) Take 1 tablet by mouth every evening.     lancets (BD ULTRA FINE LANCETS) Misc 1 Units by Misc.(Non-Drug; Combo Route) route 4 (four) times daily.    levothyroxine (SYNTHROID) 25 MCG tablet Take 1 tablet (25 mcg total) by mouth once daily.    losartan (COZAAR) 50 MG tablet TAKE 1 TABLET BY MOUTH ONCE DAILY    metolazone (ZAROXOLYN) 5 MG tablet Take 1 tablet (5 mg total) by mouth daily as needed (Weight gain of 5 pounds or more).    metoprolol tartrate (LOPRESSOR) 50 MG tablet Take 1 tablet (50 mg total) by mouth 2 (two) times daily.    moxifloxacin (AVELOX) 400 mg tablet TK 1 T PO QD FOR 8 DAYS    MV-MN/FA/LYCOPENE/LUT/HB#178 (RAMEZ MULTIVITAMIN FOR MEN ORAL) Take by mouth.    NEVANAC 0.1 % ophthalmic suspension     nitroGLYCERIN (NITROSTAT) 0.4 MG SL tablet Place 1 tablet (0.4 mg total) under the tongue every 5 (five) minutes as needed.    ofloxacin (FLOXIN) 0.3 % otic solution Place 3 drops into both ears 2 (two) times daily.     ondansetron (ZOFRAN) 4 MG tablet Take 1 tablet (4 mg total) by mouth every 8 (eight) hours as needed for Nausea.    oxymetazoline (MUCINEX SINUS-MAX) 0.05 % nasal spray 2 sprays by Nasal route once daily.    pantoprazole (PROTONIX) 40 MG tablet TAKE 1 TABLET(40 MG) BY MOUTH TWICE DAILY    PATADAY 0.2 % Drop Place 1 drop into both eyes once daily.     potassium chloride SA (K-DUR,KLOR-CON) 20 MEQ tablet TAKE 1 TABLET(20 MEQ) BY MOUTH EVERY DAY    tamsulosin (FLOMAX) 0.4 mg Cp24 TAKE 1 CAPSULE BY MOUTH EVERY DAY    triamcinolone (NASACORT) 55 mcg nasal inhaler 2 sprays by Nasal route once daily.    triamcinolone acetonide 0.1% (KENALOG) 0.1 % cream     VITAMIN D2 50,000 unit capsule TAKE 1 CAPSULE BY MOUTH 2 TIMES A WEEK    zolpidem (AMBIEN) 5 MG Tab TAKE 1 TABLET BY MOUTH EVERY EVENING           Clinical Reference Information           Your Vitals Were     BP Pulse  "Temp Resp Height Weight    143/66 (BP Location: Left arm, Patient Position: Sitting) 61 98.2 °F (36.8 °C) (Oral) 18 5' 8" (1.727 m) 118.3 kg (260 lb 12.9 oz)    SpO2 BMI             95% 39.66 kg/m2         Blood Pressure          Most Recent Value    BP  (!)  143/66      Allergies as of 3/24/2017     Cephalexin    Penicillins    Pravastatin    Sulfa (Sulfonamide Antibiotics)    Adhesive      Immunizations Administered on Date of Encounter - 3/24/2017     None      Orders Placed During Today's Visit     Future Labs/Procedures Expected by Expires    US Abdomen Limited  3/24/2017 3/25/2018      Language Assistance Services     ATTENTION: Language assistance services are available, free of charge. Please call [phone].      ATENCIÓN: Si georgela sumanth, tiene a wilkerson disposición servicios gratuitos de asistencia lingüística. Llame al [phone].     BAL Ý: N?u b?n nói Ti?ng Vi?t, có các d?ch v? h? tr? ngôn ng? mi?n phí dành cho b?n. G?i s? [phone].         [facility] complies with applicable Federal civil rights laws and does not discriminate on the basis of race, color, national origin, age, disability, or sex.        "

## 2017-03-24 NOTE — MR AVS SNAPSHOT
[facility]  [address]  [facility] [address]  Phone: [phone]  Fax: [phone]                  Vinh Castro   3/24/2017 3:00 PM   Office Visit    Description:  Male : 1950   Provider:  Kerwin Flower PA-C   Department:  [facility] - Gastroenterology           Reason for Visit     Follow-up           Diagnoses this Visit        Comments    Chronic kidney disease, stage 3 (moderate)    -  Primary     Pancreatic insufficiency                To Do List           Future Appointments        Provider Department Dept Phone    2017 8:30 AM Jo Zuñiga MD [facility] - Nephrology [phone]    2017 10:00 AM Samara Chris MD [facility] - Gastroenterology [phone]    2017 1:20 PM Rhea Gaviria MD [facility]-Physical Med & Rehab [phone]    2017 2:00 PM LARRY Cahney [facility] - Endocrinology [phone]      Goals (5 Years of Data)     None      [facility]     [facility] On Call Nurse [facility] -  Assistance  Registered nurses in the [facility] On Call Center provide clinical advisement, health education, appointment booking, and other advisory services.  Call for this free service at [phone].             Medications           Message regarding Medications     Verify the changes and/or additions to your medication regime listed below are the same as discussed with your clinician today.  If any of these changes or additions are incorrect, please notify your healthcare provider.             Verify that the below list of medications is an accurate representation of the medications you are currently taking.  If none reported, the list may be blank. If incorrect, please contact your healthcare provider. Carry this list with you in case of emergency.           Current Medications     alprazolam (XANAX) 0.5 MG tablet Take 1 tablet (0.5 mg total) by mouth daily as needed.    aspirin 81 MG Chew Take 81 mg by mouth once daily.     "blood sugar diagnostic (ACCU-CHEK JANEY PLUS TEST STRP) Strp Patient to check blood sugar 6 x's per day--Dx Code 250.62,  357.2    calcium carbonate-simethicone (MAALOX ADVANCED) 1,000-60 mg Chew Take 2 tablets by mouth 3 (three) times daily as needed.    cholecalciferol, vitamin D3, (VITAMIN D3) 5,000 unit Tab Take 5,000 Units by mouth. Takes one tablet every day except on Sun and Wed    ciprofloxacin HCl (CILOXAN) 0.3 % ophthalmic solution     CREON 24,000-76,000 -120,000 unit capsule TAKE 2 CAPSULES BY MOUTH THREE TIMES DAILY WITH MEALS AND 1 CAPSULE WITH A SNACK    DEXTROSE (GLUCOSE) Chew Take by mouth as needed.    diclofenac sodium (VOLTAREN) 1 % Gel Apply 2 g topically 4 (four) times daily. Apply to both hand    ELIQUIS 5 mg Tab TAKE 1 TABLET BY MOUTH TWICE DAILY    ferrous sulfate 325 mg (65 mg iron) Tab tablet TAKE 1 TABLET BY MOUTH EVERY DAY    fexofenadine (ALLEGRA) 180 MG tablet Take 180 mg by mouth once daily.    fluocinolone-shower cap 0.01 % Oil     fluticasone (FLONASE) 50 mcg/actuation nasal spray SHAKE WELL AND USE 2 SPRAYS IN EACH NOSTRIL EVERY DAY    furosemide (LASIX) 80 MG tablet Take 80 mg by mouth once daily.    gabapentin (NEURONTIN) 600 MG tablet Take 1 tablet (600 mg total) by mouth 4 (four) times daily with meals and nightly.    guaifenesin (MUCINEX) 600 mg 12 hr tablet Take 600 mg by mouth continuous prn for Congestion. Prn      HUMULIN R U-500, CONCENTRATED, 500 unit/mL Soln INJECT INTO SKIN 32 UNIT JORDY ON REGULAR U-100 SYRINGE BEFORE MEALS AND WITH EVENING SNACK    hydrocodone-acetaminophen 10-325mg (NORCO)  mg Tab Take 1 tablet by mouth 4 (four) times daily.    insulin needles, disposable, (BD ULTRA-FINE REJI PEN NEEDLES) 32 x 5/32 " Ndle Uses 5 daily, on multiple daily insulin injections    insulin syringe-needle U-100 1/2 mL 30 Syrg     insulin syringe-needle U-100 1/2 mL 30 Syrg USE WITH INSULIN THREE TIMES DAILY TO FOUR TIMES DAILY    isosorbide mononitrate (IMDUR) 30 MG 24 " hr tablet Take 1 tablet (30 mg total) by mouth every evening.    KRILL/OM3/DHA/EPA/OM6/LIP/ASTX (KRILL OIL, OMEGA 3 & 6, ORAL) Take 1 tablet by mouth every evening.     lancets (BD ULTRA FINE LANCETS) Misc 1 Units by Misc.(Non-Drug; Combo Route) route 4 (four) times daily.    levothyroxine (SYNTHROID) 25 MCG tablet Take 1 tablet (25 mcg total) by mouth once daily.    losartan (COZAAR) 50 MG tablet TAKE 1 TABLET BY MOUTH ONCE DAILY    metolazone (ZAROXOLYN) 5 MG tablet Take 1 tablet (5 mg total) by mouth daily as needed (Weight gain of 5 pounds or more).    metoprolol tartrate (LOPRESSOR) 50 MG tablet Take 1 tablet (50 mg total) by mouth 2 (two) times daily.    moxifloxacin (AVELOX) 400 mg tablet TK 1 T PO QD FOR 8 DAYS    MV-MN/FA/LYCOPENE/LUT/HB#178 (RAMEZ MULTIVITAMIN FOR MEN ORAL) Take by mouth.    NEVANAC 0.1 % ophthalmic suspension     nitroGLYCERIN (NITROSTAT) 0.4 MG SL tablet Place 1 tablet (0.4 mg total) under the tongue every 5 (five) minutes as needed.    ofloxacin (FLOXIN) 0.3 % otic solution Place 3 drops into both ears 2 (two) times daily.     ondansetron (ZOFRAN) 4 MG tablet Take 1 tablet (4 mg total) by mouth every 8 (eight) hours as needed for Nausea.    oxymetazoline (MUCINEX SINUS-MAX) 0.05 % nasal spray 2 sprays by Nasal route once daily.    pantoprazole (PROTONIX) 40 MG tablet TAKE 1 TABLET(40 MG) BY MOUTH TWICE DAILY    PATADAY 0.2 % Drop Place 1 drop into both eyes once daily.     potassium chloride SA (K-DUR,KLOR-CON) 20 MEQ tablet TAKE 1 TABLET(20 MEQ) BY MOUTH EVERY DAY    tamsulosin (FLOMAX) 0.4 mg Cp24 TAKE 1 CAPSULE BY MOUTH EVERY DAY    triamcinolone (NASACORT) 55 mcg nasal inhaler 2 sprays by Nasal route once daily.    triamcinolone acetonide 0.1% (KENALOG) 0.1 % cream     VITAMIN D2 50,000 unit capsule TAKE 1 CAPSULE BY MOUTH 2 TIMES A WEEK    zolpidem (AMBIEN) 5 MG Tab TAKE 1 TABLET BY MOUTH EVERY EVENING           Clinical Reference Information           Your Vitals Were     BP Pulse  "Height Weight BMI    156/62 62 5' 8" (1.727 m) 117.6 kg (259 lb 4.2 oz) 39.42 kg/m2      Blood Pressure          Most Recent Value    BP  (!)  156/62      Allergies as of 3/24/2017     Cephalexin    Penicillins    Pravastatin    Sulfa (Sulfonamide Antibiotics)    Adhesive      Immunizations Administered on Date of Encounter - 3/24/2017     None      Orders Placed During Today's Visit      Normal Orders This Visit    Ambulatory Referral to Nephrology       Language Assistance Services     ATTENTION: Language assistance services are available, free of charge. Please call [phone].      ATENCIÓN: Si habla español, tiene a wilkerson disposición servicios gratuitos de asistencia lingüística. Llame al [phone].     CHÚ Ý: N?u b?n nói Ti?ng Vi?t, có các d?ch v? h? tr? ngôn ng? mi?n phí dành cho b?n. G?i s? [phone].         [facility] complies with applicable Federal civil rights laws and does not discriminate on the basis of race, color, national origin, age, disability, or sex.        "

## 2017-03-25 PROBLEM — K86.89 PANCREATIC INSUFFICIENCY: Status: ACTIVE | Noted: 2017-03-25

## 2017-03-25 PROBLEM — R11.0 CHRONIC NAUSEA: Status: ACTIVE | Noted: 2017-03-25

## 2017-03-27 ENCOUNTER — TELEPHONE (OUTPATIENT)
Dept: HEPATOLOGY | Facility: CLINIC | Age: 67
End: 2017-03-27

## 2017-03-27 ENCOUNTER — TELEPHONE (OUTPATIENT)
Dept: INTERNAL MEDICINE | Facility: CLINIC | Age: 67
End: 2017-03-27

## 2017-03-27 NOTE — TELEPHONE ENCOUNTER
Patient called asking what vaccination he need Hep A? Hep B? Or both? He said he will get this done at [facility] locally.

## 2017-03-27 NOTE — TELEPHONE ENCOUNTER
----- Message from Suad Pereira sent at 3/27/2017 10:42 AM CDT -----  Contact: Self/   Type: Sooner appointment than  is able to schedule    When is the first available appointment? 05/11/2017     What is the nature of the appointment? Problems with medication and pain around the stomach     What appointment type: ep     Comments: pt want to see the doctor on 04/18. Please call and advise       Thank you

## 2017-03-30 NOTE — TELEPHONE ENCOUNTER
MA called patient inform patient that he will need Hep B vaccine, mailed external order to patient he will get his vaccine at [facility]. AYDE

## 2017-04-02 DIAGNOSIS — F51.01 PRIMARY INSOMNIA: ICD-10-CM

## 2017-04-02 RX ORDER — ZOLPIDEM TARTRATE 5 MG/1
TABLET ORAL
Qty: 30 TABLET | Refills: 0 | Status: SHIPPED | OUTPATIENT
Start: 2017-04-02 | End: 2018-03-22

## 2017-04-05 DIAGNOSIS — E03.9 ACQUIRED HYPOTHYROIDISM: ICD-10-CM

## 2017-04-05 RX ORDER — LEVOTHYROXINE SODIUM 25 UG/1
TABLET ORAL
Qty: 30 TABLET | Refills: 5 | Status: SHIPPED | OUTPATIENT
Start: 2017-04-05 | End: 2017-10-28 | Stop reason: SDUPTHER

## 2017-04-08 ENCOUNTER — DOCUMENTATION ONLY (OUTPATIENT)
Dept: INTERNAL MEDICINE | Facility: CLINIC | Age: 67
End: 2017-04-08

## 2017-04-08 ENCOUNTER — TELEPHONE (OUTPATIENT)
Dept: INTERNAL MEDICINE | Facility: CLINIC | Age: 67
End: 2017-04-08

## 2017-04-08 NOTE — PROGRESS NOTES
Pre-Visit Review & Summary:    67 y/o male with hx of Morbid Obesity, poorly controlled Type 2 DM, Diabetic Nephropathy (CKD-3), Diabetic Retinopathy, Diabetic Peripheral Sensory Neuropathy, Diabetic Autonomic Neuropathy (Syncope and Hypotension), HTN, SEAN, Paroxysmal Atrial Fib on Eliquis, CAD, HFpEF, Pancreatic Insufficiency, Hypothyroidism, and Chronic Venous insufficiency has a scheduled appt 4/18/17 to discuss his stomach issues.    He was last seen 1/23/17 complaining of 2 month history of chronic nausea unrelated to meals and occurring throughout the day.. He also noted indigestion, increasing gas and belching. He denied fever, regurgitation, vomiting, chest pain, blood in stool, melena, abdominal pain. He also noted intermittent constipation. He was switched from Nexium to Protonix 40 mg BID, given prescription for Zofran, CBC, CMP, Lipase, and EGD were ordered and he was referred to GI.    He has a prior hx of Chronic Nausea from Diabetic Gastroparesis, Pancreatic Insufficiency, and GERD.  EGD (4/2014): Hyperplastic Gastric and duodenal Polyps. Bx: Chronic Active Gastritis; (-) H.pylori. He has hx of Pancreatic Insufficiency and was taking Creon TID with meals. Colonoscopy 10/2012 - tubular adenoma; Repeat Colonoscopy is due 10/2017. EUS (2/2015): Pancreatic parenchymal abnormalities consisting of atrophy, hyperechoic strands and lobularity in the entire pancreas. No evidence of a focal mass or intraductal defects. No sign of significant pathology in CBD.    EGD (2/6/17):Congested and erythematous mucosa in the gastric fundus and gastric body. Suspicious for portal   hypertensive gastropathy. Multiple gastric polyps. Path: Chronic Gastritis, (-) H.pylori    He was seen by GI 2/2017 who recommended referral to Hepatology due to elevated LFS (AST-135, ALT-113) and possible portal hypertensive gastropathy findings on EGD. A CT Abdomen (2/23/17): nodularity of liver due to possibile underlying cirrhosis. Near  complete fatty replacement of pancreas, no pancreatic masses identified. GI recommended low fat diet, frequent small meals, and referral to Pancreatic GI specialist, Dr. Jacob Chris, appt scheduled for 4/18/17.    He has been followed by Hepatology for elevated Liver Enzymes. Fibrosure score (2/2017) was 0.76, F4 c/w severe cirrhosis.           PMH    1. Type 2 DM               Poorly controlled              Followed in Endocrine             Humulin U-500 Insulin - 33 QID    2. Diabetic Retinopathy     3. Diabetic Nephropathy with CKD-3              Sub-nephrotic range Proteinuria              Losartan 50 mg/day    4. Diabetic Peripheral Sensory Neuropathy             Gabapentin 600 mg TID     5. Cirrhosis of Liver - 2/2 PARRISH            Negative A1AT, Scot's, HH, AIH            Immunity to hepatitis A but not for B            Hepatitis C is negative            Fibrosure: F4            EGD (2/2017): c/w Portal Hypertensive Gastropathy            US/CT (2/2017): nodular liver w/ splenomegaly     6. Paroxysmal Atrial Fib             CHADS-4 - on Eliquis           S/P Ablation 8/2014 & 8/2015           Followed by Dr. MIKE Galloway           Loop Recorder Implantation 5/23/16, checked monthly (NSR with occ AT)    7. CAD with HFpEF           Lasix 80 mg/day           S/P CABG 2001, PCI 2002             PET Stress Scan 11/2012 - small perfusion defect                                                          In area of D2; < 1 % of myocardium           PET Stress Test 7/21/14: no clinically significant perfusion defects, no change             Cardiac Cath 12/2014 - patent coronary arteries    8. HTN              Losartan 50 mg/day, Lopressor 50 mg BID, Lasix 80 mg daily     9. Chronic Venous Insufficency             Lasix 80 mg daily    10. Morbid Obesity (BMI-40)    11. SEAN              CPAP     12. Hyperlipidemia              Intolerant Pravastatin 80 mg/day - severe myalgia             Fish Oil -3 Omega FA 2 g/day     13.  GERD              Protonix 40 mg/day    14. Pancreatic Insufficiency with Chronic Diarrhea             Confirmed on EUS 2/23/15             Creon TID with meals and snacks    15. (B) Mild ICA Stenosis                Carotid US 7/2013 < 40 % stenosis; no change from 2012     16. Hx of Kidney Stones     17. BPH, ED, Peyronie's Disease               Tamsulosin; followed in Urology clinic     18. (B) Sensorineural Hearing Loss     19. Chronic Dizziness                c/w Diabetic Autonomic Neuropathy - Neurology (Dr. Maria)                (+) Tilt Test; (-) MRA Brain 5/2014              ENT Evaluation(Dr. Marquez): (-) VNG 9/2012                CT and MRI Brain: chronic ischemic microvascular disease                Vascular Medicine (2/2013): Mild ICA stenosis                                     No evidence of VB insufficiency or Carotid Hypersensitivity     20. Allergic Rhinitis                Flonase Nasal Spray,  Allegra     21. DJD C/S, L/S, L-Hip              MRI C/S (10/2012): multilevel DJD; C5-6 Neuroforaminal Narrowing                Followed by Dr. Gaviria (Physical Medicine)              Norco  mg q 6 hrs, Gabapentin 600 mg QID    22. Chronic Anemia     23. Hx of Colonic Polyps             Colonoscopy 10/2012 - tubular adenoma; Repeat Colonoscopy 10/2017    24. Vitamin D Deficiency               Ergocalciferol 50,000 units 2 x a week    25. Depression, Anxiety, & Insomnia              Ambien 10 mg hs, Xanax     26. Glaucoma              Pataday Drops     27. Hx of Costochondritis (6/2013) - atypical chest pain - chest wall pain    28. Hypothyroidism              Synthroid 25 mcg/day      Meds:  (Unsure of accuracy of this list as he did not bring in with him)           Albuterol Inhaler prn             Allegra           Amlodipine 5 mg/day - unclear if taking           Cholecalciferol 400 units daily           Co-enzyme Q-10 200 mg BID            Creon - 2 with meals            Eliquis (Apixaban) 5 mg  BID           Ergocalciferol (ERGOCALCIFEROL) 50,000 units twice weekly             FLONASE 50 mcg/actuation nasal spray, BID                   Lasix 40 mg daily             Gabapentin 600 mg TID             Hydrocodone-acetaminophen (Norco)  mg q 6 h prn             Imdur 30 mg/day           Insulin (Humulin R) U-500 33-33-33-33           KCL 20 meq/day           Krill Oil/Heidrick 3 FA             Lasix 80 mg/day           Losartan 75 mg/day - unclear if taking           Metoprolol tartrate 100 mg BID - unclear if taking           Nexium 40 mg/day           NitroGLYCERIN (NITROSTAT) 0.4 MG SL prn             Pataday eye Drops             Synthroid 25 mcg/day           Tamsulosin (FLOMAX) 0.4 mg Cp24, daily           Triamcinolone (Kenalog) Cream 0.1%             Valium 2 mg - not taking           Xanax 0.5 mg prn - not taking           Zolpidem 10 mg hs prn       Allergies:           Cephalosporins         PCN           Sulfa     Surgical Hx:           Tonsillectomy           Cholecystectomy            Inguinal Hernia Repair            CABG - LAD & OM 2001            PCI Angioplasty 2002            Cardiac Ablation for AF - 8/2014          Hand Surgery            Eye Surgery            Left Knee Arthroscopic Sx - Meniscectomy    Social Hx:          Non-smoker          ETOH: none                    Retired Manager at [employer]     Recent Lab:          Annual PE: 5/3/16          CBC (2/2017): 11.8/34.6          S. Ferritin (5/3/16): 188          TSH (1/2017): 0.724          Hgb A1c (2/2017): 11.1          Lipids (4/3/16): Chol-205, TG-248, HDL-29, LDL-126          CMP (2/20/17): BUN-28, Cr-1.7, eGFR-40, Glu-188, Alb-3.3, AST-87, ALT-90           Fibrosure: F4           EGD (2/2017): c/w Portal Hypertensive Gastropathy           US/CT Abdomen (2/2017): nodular liver w/ splenomegaly     IMP:  1. Chronic Nausea due to GERD, Pancreatic Insufficiency, and Diabetic Gastroparesis   2. Cirrhosis of Liver - 2/2  PARRISH  3. Poorly controlled Type 2 DM with Diabetic Retinopathy, Nephropathy, Peripheral Sensory Neuropathy  4. Morbid Obesity  5. HTN  6. Paroxysmal Atrial Fib, on Eliquis  7. CAD without Angina  8. HFpEF  9. Pancreatic Insufficiency  10. GERD  11. CKD - Stage 3  12. SEAN   13. Chronic Venous Insufficiency  14. Hypothyroidism  15. Vit D Deficiency  16. Hx of Colon Polyps  17. Depression   18. AR  19. BPH, ED  20. Glaucoma  21. DJD Hip, C/S and L/S Spine  22. Chronic Opiate Use  23. Chronic Anemia  24. Hx of Kidney Stones

## 2017-04-08 NOTE — TELEPHONE ENCOUNTER
Please call patient and tell him to discontinue Meloxicam, which is a NSAID, that was prescribed by a non-[facility]. This is not good for his kidneys, heart, and GERD. Remind him not to take any NSAIDS as we discussed before.

## 2017-04-16 DIAGNOSIS — Z86.79 ATRIAL FIBRILLATION, CURRENTLY IN SINUS RHYTHM: ICD-10-CM

## 2017-04-16 RX ORDER — APIXABAN 5 MG/1
TABLET, FILM COATED ORAL
Qty: 60 TABLET | Refills: 5 | Status: SHIPPED | OUTPATIENT
Start: 2017-04-16 | End: 2017-10-03 | Stop reason: SDUPTHER

## 2017-04-18 ENCOUNTER — OFFICE VISIT (OUTPATIENT)
Dept: NEPHROLOGY | Facility: CLINIC | Age: 67
End: 2017-04-18
Payer: MEDICARE

## 2017-04-18 ENCOUNTER — OFFICE VISIT (OUTPATIENT)
Dept: PHYSICAL MEDICINE AND REHAB | Facility: CLINIC | Age: 67
End: 2017-04-18
Payer: MEDICARE

## 2017-04-18 ENCOUNTER — OFFICE VISIT (OUTPATIENT)
Dept: ENDOCRINOLOGY | Facility: CLINIC | Age: 67
End: 2017-04-18
Payer: MEDICARE

## 2017-04-18 ENCOUNTER — PATIENT OUTREACH (OUTPATIENT)
Dept: DIABETES | Facility: CLINIC | Age: 67
End: 2017-04-18
Payer: MEDICARE

## 2017-04-18 ENCOUNTER — OFFICE VISIT (OUTPATIENT)
Dept: GASTROENTEROLOGY | Facility: CLINIC | Age: 67
End: 2017-04-18
Payer: MEDICARE

## 2017-04-18 ENCOUNTER — OFFICE VISIT (OUTPATIENT)
Dept: INTERNAL MEDICINE | Facility: CLINIC | Age: 67
End: 2017-04-18
Payer: MEDICARE

## 2017-04-18 VITALS
SYSTOLIC BLOOD PRESSURE: 161 MMHG | BODY MASS INDEX: 38.46 KG/M2 | HEART RATE: 62 BPM | HEIGHT: 68 IN | DIASTOLIC BLOOD PRESSURE: 72 MMHG | WEIGHT: 253.75 LBS

## 2017-04-18 VITALS
SYSTOLIC BLOOD PRESSURE: 140 MMHG | OXYGEN SATURATION: 96 % | HEIGHT: 68 IN | DIASTOLIC BLOOD PRESSURE: 64 MMHG | WEIGHT: 256.81 LBS | HEART RATE: 68 BPM | BODY MASS INDEX: 38.92 KG/M2

## 2017-04-18 VITALS
HEART RATE: 70 BPM | BODY MASS INDEX: 38.41 KG/M2 | DIASTOLIC BLOOD PRESSURE: 65 MMHG | HEIGHT: 68 IN | SYSTOLIC BLOOD PRESSURE: 162 MMHG | WEIGHT: 253.44 LBS

## 2017-04-18 VITALS
HEIGHT: 68 IN | HEART RATE: 68 BPM | BODY MASS INDEX: 38.49 KG/M2 | WEIGHT: 254 LBS | DIASTOLIC BLOOD PRESSURE: 62 MMHG | SYSTOLIC BLOOD PRESSURE: 156 MMHG

## 2017-04-18 VITALS
WEIGHT: 255.06 LBS | SYSTOLIC BLOOD PRESSURE: 157 MMHG | DIASTOLIC BLOOD PRESSURE: 65 MMHG | HEIGHT: 68 IN | BODY MASS INDEX: 38.65 KG/M2 | HEART RATE: 68 BPM

## 2017-04-18 DIAGNOSIS — K74.60 CIRRHOSIS OF LIVER WITHOUT ASCITES, UNSPECIFIED HEPATIC CIRRHOSIS TYPE: ICD-10-CM

## 2017-04-18 DIAGNOSIS — N18.30 CKD (CHRONIC KIDNEY DISEASE), STAGE III: Primary | ICD-10-CM

## 2017-04-18 DIAGNOSIS — E78.5 HYPERLIPIDEMIA, UNSPECIFIED HYPERLIPIDEMIA TYPE: ICD-10-CM

## 2017-04-18 DIAGNOSIS — E11.21 DIABETIC NEPHROPATHY ASSOCIATED WITH TYPE 2 DIABETES MELLITUS: ICD-10-CM

## 2017-04-18 DIAGNOSIS — K86.89 PANCREATIC INSUFFICIENCY: ICD-10-CM

## 2017-04-18 DIAGNOSIS — M46.92 SPONDYLITIS, CERVICAL: ICD-10-CM

## 2017-04-18 DIAGNOSIS — E66.01 MORBID OBESITY WITH BMI OF 40.0-44.9, ADULT: ICD-10-CM

## 2017-04-18 DIAGNOSIS — K86.89 PANCREATIC INSUFFICIENCY: Primary | ICD-10-CM

## 2017-04-18 DIAGNOSIS — G89.29 CHRONIC LOW BACK PAIN WITH SCIATICA, SCIATICA LATERALITY UNSPECIFIED, UNSPECIFIED BACK PAIN LATERALITY: Primary | ICD-10-CM

## 2017-04-18 DIAGNOSIS — M54.2 CHRONIC NECK PAIN: ICD-10-CM

## 2017-04-18 DIAGNOSIS — E11.42 DIABETIC PERIPHERAL NEUROPATHY: ICD-10-CM

## 2017-04-18 DIAGNOSIS — M54.50 CHRONIC BILATERAL LOW BACK PAIN WITHOUT SCIATICA: ICD-10-CM

## 2017-04-18 DIAGNOSIS — R80.9 PROTEINURIA, UNSPECIFIED TYPE: ICD-10-CM

## 2017-04-18 DIAGNOSIS — E03.9 ACQUIRED HYPOTHYROIDISM: ICD-10-CM

## 2017-04-18 DIAGNOSIS — M50.30 DDD (DEGENERATIVE DISC DISEASE), CERVICAL: ICD-10-CM

## 2017-04-18 DIAGNOSIS — G56.01 CARPAL TUNNEL SYNDROME OF RIGHT WRIST: ICD-10-CM

## 2017-04-18 DIAGNOSIS — I12.9 HYPERTENSIVE CKD (CHRONIC KIDNEY DISEASE): ICD-10-CM

## 2017-04-18 DIAGNOSIS — M54.40 CHRONIC LOW BACK PAIN WITH SCIATICA, SCIATICA LATERALITY UNSPECIFIED, UNSPECIFIED BACK PAIN LATERALITY: Primary | ICD-10-CM

## 2017-04-18 DIAGNOSIS — G89.29 CHRONIC NECK PAIN: ICD-10-CM

## 2017-04-18 DIAGNOSIS — N18.30 CKD (CHRONIC KIDNEY DISEASE), STAGE III: ICD-10-CM

## 2017-04-18 DIAGNOSIS — K21.9 GASTROESOPHAGEAL REFLUX DISEASE, ESOPHAGITIS PRESENCE NOT SPECIFIED: ICD-10-CM

## 2017-04-18 DIAGNOSIS — E08.3299 MILD NONPROLIFERATIVE DIABETIC RETINOPATHY WITHOUT MACULAR EDEMA ASSOCIATED WITH DIABETES MELLITUS DUE TO UNDERLYING CONDITION, UNSPECIFIED LATERALITY: ICD-10-CM

## 2017-04-18 DIAGNOSIS — I10 ESSENTIAL HYPERTENSION: ICD-10-CM

## 2017-04-18 DIAGNOSIS — I48.0 PAROXYSMAL ATRIAL FIBRILLATION: Primary | ICD-10-CM

## 2017-04-18 DIAGNOSIS — M19.041 PRIMARY OSTEOARTHRITIS OF RIGHT HAND: ICD-10-CM

## 2017-04-18 DIAGNOSIS — G89.29 CHRONIC BILATERAL LOW BACK PAIN WITHOUT SCIATICA: ICD-10-CM

## 2017-04-18 PROCEDURE — 99215 OFFICE O/P EST HI 40 MIN: CPT | Mod: PBBFAC,27 | Performed by: NURSE PRACTITIONER

## 2017-04-18 PROCEDURE — 99999 PR PBB SHADOW E&M-EST. PATIENT-LVL III: CPT | Mod: PBBFAC,,, | Performed by: INTERNAL MEDICINE

## 2017-04-18 PROCEDURE — 99214 OFFICE O/P EST MOD 30 MIN: CPT | Mod: S$PBB,,, | Performed by: PHYSICAL MEDICINE & REHABILITATION

## 2017-04-18 PROCEDURE — 99999 PR PBB SHADOW E&M-EST. PATIENT-LVL V: CPT | Mod: PBBFAC,,, | Performed by: NURSE PRACTITIONER

## 2017-04-18 PROCEDURE — 99214 OFFICE O/P EST MOD 30 MIN: CPT | Mod: S$PBB,,, | Performed by: INTERNAL MEDICINE

## 2017-04-18 PROCEDURE — 99215 OFFICE O/P EST HI 40 MIN: CPT | Mod: S$PBB,,, | Performed by: INTERNAL MEDICINE

## 2017-04-18 PROCEDURE — 99214 OFFICE O/P EST MOD 30 MIN: CPT | Mod: S$PBB,,, | Performed by: NURSE PRACTITIONER

## 2017-04-18 PROCEDURE — 99213 OFFICE O/P EST LOW 20 MIN: CPT | Mod: PBBFAC,27 | Performed by: PHYSICAL MEDICINE & REHABILITATION

## 2017-04-18 PROCEDURE — 99999 PR PBB SHADOW E&M-EST. PATIENT-LVL V: CPT | Mod: PBBFAC,,, | Performed by: INTERNAL MEDICINE

## 2017-04-18 PROCEDURE — 99999 PR PBB SHADOW E&M-EST. PATIENT-LVL III: CPT | Mod: PBBFAC,,, | Performed by: PHYSICAL MEDICINE & REHABILITATION

## 2017-04-18 RX ORDER — POLYETHYLENE GLYCOL 3350 17 G/17G
17 POWDER, FOR SOLUTION ORAL DAILY
Qty: 1 BOTTLE | Refills: 3 | Status: SHIPPED | OUTPATIENT
Start: 2017-04-18 | End: 2017-08-14 | Stop reason: SDUPTHER

## 2017-04-18 RX ORDER — GABAPENTIN 600 MG/1
600 TABLET ORAL
Qty: 360 TABLET | Refills: 1 | Status: SHIPPED | OUTPATIENT
Start: 2017-04-18 | End: 2017-07-17

## 2017-04-18 RX ORDER — HYDROCODONE BITARTRATE AND ACETAMINOPHEN 10; 325 MG/1; MG/1
1 TABLET ORAL 4 TIMES DAILY
Refills: 0 | COMMUNITY
Start: 2017-03-26 | End: 2017-04-18 | Stop reason: SDUPTHER

## 2017-04-18 RX ORDER — HYDROCODONE BITARTRATE AND ACETAMINOPHEN 10; 325 MG/1; MG/1
1 TABLET ORAL EVERY 6 HOURS PRN
Qty: 120 TABLET | Refills: 0 | Status: SHIPPED | OUTPATIENT
Start: 2017-05-18 | End: 2017-08-24 | Stop reason: SDUPTHER

## 2017-04-18 RX ORDER — INSULIN HUMAN 500 [IU]/ML
INJECTION, SOLUTION SUBCUTANEOUS
Qty: 20 ML | Refills: 3 | Status: SHIPPED | OUTPATIENT
Start: 2017-04-18 | End: 2017-04-29 | Stop reason: SDUPTHER

## 2017-04-18 RX ORDER — HYDROCODONE BITARTRATE AND ACETAMINOPHEN 10; 325 MG/1; MG/1
1 TABLET ORAL EVERY 6 HOURS PRN
Qty: 120 TABLET | Refills: 0 | Status: SHIPPED | OUTPATIENT
Start: 2017-06-18 | End: 2017-06-06 | Stop reason: SDUPTHER

## 2017-04-18 RX ORDER — HYDROCODONE BITARTRATE AND ACETAMINOPHEN 10; 325 MG/1; MG/1
1 TABLET ORAL EVERY 6 HOURS PRN
Qty: 120 TABLET | Refills: 0 | Status: SHIPPED | OUTPATIENT
Start: 2017-04-18 | End: 2017-05-18

## 2017-04-18 NOTE — MR AVS SNAPSHOT
[facility] - Nephrology  [address]  [facility] [address]  Phone: [phone]  Fax: [phone]                  Vinh Castro   2017 8:30 AM   Office Visit    Description:  Male : 1950   Provider:  Jo Zuñiga MD   Department:  [facility] - Nephrology           Reason for Visit     Chronic Kidney Disease           Diagnoses this Visit        Comments    CKD (chronic kidney disease), stage III    -  Primary     Diabetic nephropathy associated with type 2 diabetes mellitus         Hypertensive CKD (chronic kidney disease)         Proteinuria, unspecified type                To Do List           Future Appointments        Provider Department Dept Phone    2017 10:00 AM Samara Chris MD [facility] - Gastroenterology [phone]    2017 11:20 AM Natalee Thapa MD [facility] - Internal Medicine [phone]    2017 1:20 PM Rhea Gaviria MD [facility]-Physical Med & Rehab [phone]    2017 2:00 PM LARRY Chaney [facility] - Endocrinology [phone]      Goals (5 Years of Data)     None      [facility]     [facility] Nurse Care Line -  Assistance  Unless otherwise directed by your provider, please contact [facility], our nurse care line that is available for  assistance.     Registered nurses in the [facility] provide: appointment scheduling, clinical advisement, health education, and other advisory services.  Call: [phone] (toll free)               Medications           Message regarding Medications     Verify the changes and/or additions to your medication regime listed below are the same as discussed with your clinician today.  If any of these changes or additions are incorrect, please notify your healthcare provider.        STOP taking these medications     potassium chloride SA (K-DUR,KLOR-CON) 20 MEQ tablet TAKE 1 TABLET(20 MEQ) BY MOUTH EVERY DAY           Verify that the below list of medications is an  accurate representation of the medications you are currently taking.  If none reported, the list may be blank. If incorrect, please contact your healthcare provider. Carry this list with you in case of emergency.           Current Medications     alprazolam (XANAX) 0.5 MG tablet Take 1 tablet (0.5 mg total) by mouth daily as needed.    aspirin 81 MG Chew Take 81 mg by mouth once daily.    blood sugar diagnostic (ACCU-CHEK JANEY PLUS TEST STRP) Strp Patient to check blood sugar 6 x's per day--Dx Code 250.62,  357.2    calcium carbonate-simethicone (MAALOX ADVANCED) 1,000-60 mg Chew Take 2 tablets by mouth 3 (three) times daily as needed.    cholecalciferol, vitamin D3, (VITAMIN D3) 5,000 unit Tab Take 5,000 Units by mouth. Takes one tablet every day except on Sun and Wed    ciprofloxacin HCl (CILOXAN) 0.3 % ophthalmic solution     CREON 24,000-76,000 -120,000 unit capsule TAKE 2 CAPSULES BY MOUTH THREE TIMES DAILY WITH MEALS AND 1 CAPSULE WITH A SNACK    DEXTROSE (GLUCOSE) Chew Take by mouth as needed.    diclofenac sodium (VOLTAREN) 1 % Gel Apply 2 g topically 4 (four) times daily. Apply to both hand    ELIQUIS 5 mg Tab TAKE 1 TABLET BY MOUTH TWICE DAILY    ferrous sulfate 325 mg (65 mg iron) Tab tablet TAKE 1 TABLET BY MOUTH EVERY DAY    fexofenadine (ALLEGRA) 180 MG tablet Take 180 mg by mouth once daily.    fluocinolone-shower cap 0.01 % Oil     fluticasone (FLONASE) 50 mcg/actuation nasal spray SHAKE WELL AND USE 2 SPRAYS IN EACH NOSTRIL EVERY DAY    furosemide (LASIX) 80 MG tablet Take 80 mg by mouth once daily.    gabapentin (NEURONTIN) 600 MG tablet Take 1 tablet (600 mg total) by mouth 4 (four) times daily with meals and nightly.    guaifenesin (MUCINEX) 600 mg 12 hr tablet Take 600 mg by mouth continuous prn for Congestion. Prn      HUMULIN R U-500, CONCENTRATED, 500 unit/mL Soln INJECT INTO SKIN 32 UNIT JORDY ON REGULAR U-100 SYRINGE BEFORE MEALS AND WITH EVENING SNACK    hydrocodone-acetaminophen 10-325mg  "(NORCO)  mg Tab Take 1 tablet by mouth 4 (four) times daily.    insulin needles, disposable, (BD ULTRA-FINE REJI PEN NEEDLES) 32 x 5/32 " Ndle Uses 5 daily, on multiple daily insulin injections    insulin syringe-needle U-100 1/2 mL 30 Syrg     insulin syringe-needle U-100 1/2 mL 30 Syrg USE WITH INSULIN THREE TIMES DAILY TO FOUR TIMES DAILY    isosorbide mononitrate (IMDUR) 30 MG 24 hr tablet Take 1 tablet (30 mg total) by mouth every evening.    KRILL/OM3/DHA/EPA/OM6/LIP/ASTX (KRILL OIL, OMEGA 3 & 6, ORAL) Take 1 tablet by mouth every evening.     lancets (BD ULTRA FINE LANCETS) Misc 1 Units by Misc.(Non-Drug; Combo Route) route 4 (four) times daily.    levothyroxine (SYNTHROID) 25 MCG tablet TAKE 1 TABLET(25 MCG) BY MOUTH EVERY DAY    losartan (COZAAR) 50 MG tablet TAKE 1 TABLET BY MOUTH ONCE DAILY    metolazone (ZAROXOLYN) 5 MG tablet Take 1 tablet (5 mg total) by mouth daily as needed (Weight gain of 5 pounds or more).    metoprolol tartrate (LOPRESSOR) 50 MG tablet Take 1 tablet (50 mg total) by mouth 2 (two) times daily.    moxifloxacin (AVELOX) 400 mg tablet TK 1 T PO QD FOR 8 DAYS    MV-MN/FA/LYCOPENE/LUT/HB#178 (RAMEZ MULTIVITAMIN FOR MEN ORAL) Take by mouth.    NEVANAC 0.1 % ophthalmic suspension     nitroGLYCERIN (NITROSTAT) 0.4 MG SL tablet Place 1 tablet (0.4 mg total) under the tongue every 5 (five) minutes as needed.    ofloxacin (FLOXIN) 0.3 % otic solution Place 3 drops into both ears 2 (two) times daily.     ondansetron (ZOFRAN) 4 MG tablet Take 1 tablet (4 mg total) by mouth every 8 (eight) hours as needed for Nausea.    oxymetazoline (MUCINEX SINUS-MAX) 0.05 % nasal spray 2 sprays by Nasal route once daily.    pantoprazole (PROTONIX) 40 MG tablet TAKE 1 TABLET(40 MG) BY MOUTH TWICE DAILY    PATADAY 0.2 % Drop Place 1 drop into both eyes once daily.     tamsulosin (FLOMAX) 0.4 mg Cp24 TAKE 1 CAPSULE BY MOUTH EVERY DAY    triamcinolone (NASACORT) 55 mcg nasal inhaler 2 sprays by Nasal route " "once daily.    triamcinolone acetonide 0.1% (KENALOG) 0.1 % cream     VITAMIN D2 50,000 unit capsule TAKE 1 CAPSULE BY MOUTH 2 TIMES A WEEK    zolpidem (AMBIEN) 5 MG Tab TAKE 1 TABLET BY MOUTH EVERY EVENING           Clinical Reference Information           Your Vitals Were     BP Pulse Height Weight SpO2 BMI    140/64 68 5' 8" (1.727 m) 116.5 kg (256 lb 13.4 oz) 96% 39.05 kg/m2      Blood Pressure          Most Recent Value    BP  (!)  140/64      Allergies as of 4/18/2017     Cephalexin    Penicillins    Pravastatin    Sulfa (Sulfonamide Antibiotics)    Adhesive      Immunizations Administered on Date of Encounter - 4/18/2017     None      Orders Placed During Today's Visit     Future Labs/Procedures Expected by Expires    CBC auto differential  4/18/2017 6/17/2018    Immunofixation electrophoresis  4/18/2017 6/17/2018    Protein / creatinine ratio, urine  4/18/2017 4/18/2018    Protein electrophoresis, serum  4/18/2017 6/17/2018    PTH, intact  4/18/2017 6/17/2018    Renal function panel  4/18/2017 6/17/2018    Urinalysis  4/18/2017 4/18/2018    US Retroperitoneal Complete (Kidney and  4/18/2017 4/18/2018    Vitamin D  4/18/2017 6/17/2018      Language Assistance Services     ATTENTION: Language assistance services are available, free of charge. Please call [phone].      ATENCIÓN: Si habla español, tiene a wilkerson disposición servicios gratuitos de asistencia lingüística. Llame al [phone].     BAL Ý: N?u b?n nói Ti?ng Vi?t, có các d?ch v? h? tr? ngôn ng? mi?n phí dành cho b?n. G?i s? [phone].         [facility] complies with applicable Federal civil rights laws and does not discriminate on the basis of race, color, national origin, age, disability, or sex.        "

## 2017-04-18 NOTE — MR AVS SNAPSHOT
[facility] - Endocrin[facility]  [address]  [facility] [address]  Phone: [phone]                  Vinh Castro   2017 2:00 PM   Office Visit    Description:  Male : 1950   Provider:  LARRY Chaney   Department:  [facility] End[facility]inology           Reason for Visit     Diabetes Mellitus           Diagnoses this Visit        Comments    Uncontrolled type 2 diabetes mellitus with complication, with long-term current use of insulin    -  Primary     Diabetic peripheral neuropathy         Diabetic nephropathy associated with type 2 diabetes mellitus         Mild nonproliferative diabetic retinopathy without macular edema associated with diabetes mellitus due to underlying condition, unspecified laterality         Essential hypertension         Hyperlipidemia, unspecified hyperlipidemia type         Acquired hypothyroidism         Morbid obesity with BMI of 40.0-44.9, adult                To Do List           Future Appointments        Provider Department Dept Phone    2017 4:00 PM LAB, APPOINTMENT [place] [facility] [phone]    2017 11:00 AM Jo Zuñiga MD [facility] - Nephrology [phone]    2017 8:00 AM LAB, [facility] [phone]    2017 9:30 AM LARRY Chaney [facility] Endocrinology [phone]      Goals (5 Years of Data)     None      Follow-Up and Disposition     Return in about 3 months (around 2017).       These Medications        Disp Refills Start End    HUMULIN R U-500, CONCENTRATED, 500 unit/mL Soln 20 mL 3 2017     32 unit edgar before breakfast, 33 edgar before lunch and dinner, 32 edgar before night snack on U100 syringe. Plus correction. Max TDD 130U.    Pharmacy: [facility] [address] [facility] [address] AT [facility] Ph #: [phone]         [facility]     [facility] Care [facility] -  Assistance  Unless  otherwise directed by your provider, please contact [facility], our nurse care line that is available for 24/7 assistance.     Registered nurses in the [facility] provide: appointment scheduling, clinical advisement, health education, and other advisory services.  Call: [phone] (toll free)               Medications           Message regarding Medications     Verify the changes and/or additions to your medication regime listed below are the same as discussed with your clinician today.  If any of these changes or additions are incorrect, please notify your healthcare provider.        CHANGE how you are taking these medications     Start Taking Instead of    HUMULIN R U-500, CONCENTRATED, 500 unit/mL Soln HUMULIN R U-500, CONCENTRATED, 500 unit/mL Soln    Dosage:  32 unit jordy before breakfast, 33 jordy before lunch and dinner, 32 jordy before night snack on U100 syringe. Plus correction. Max TDD 130U. Dosage:  INJECT INTO SKIN 32 UNIT JORDY ON REGULAR U-100 SYRINGE BEFORE MEALS AND WITH EVENING SNACK    Reason for Change:  Reorder            Verify that the below list of medications is an accurate representation of the medications you are currently taking.  If none reported, the list may be blank. If incorrect, please contact your healthcare provider. Carry this list with you in case of emergency.           Current Medications     alprazolam (XANAX) 0.5 MG tablet Take 1 tablet (0.5 mg total) by mouth daily as needed.    aspirin 81 MG Chew Take 81 mg by mouth once daily.    blood sugar diagnostic (ACCU-CHEK JANEY PLUS TEST STRP) Strp Patient to check blood sugar 6 x's per day--Dx Code 250.62,  357.2    calcium carbonate-simethicone (MAALOX ADVANCED) 1,000-60 mg Chew Take 2 tablets by mouth 3 (three) times daily as needed.    cholecalciferol, vitamin D3, (VITAMIN D3) 5,000 unit Tab Take 5,000 Units by mouth. Takes one tablet every day except on Sun and Wed    ciprofloxacin HCl (CILOXAN) 0.3 %  "ophthalmic solution     CREON 24,000-76,000 -120,000 unit capsule TAKE 2 CAPSULES BY MOUTH THREE TIMES DAILY WITH MEALS AND 1 CAPSULE WITH A SNACK    DEXTROSE (GLUCOSE) Chew Take by mouth as needed.    ELIQUIS 5 mg Tab TAKE 1 TABLET BY MOUTH TWICE DAILY    ferrous sulfate 325 mg (65 mg iron) Tab tablet TAKE 1 TABLET BY MOUTH EVERY DAY    fexofenadine (ALLEGRA) 180 MG tablet Take 180 mg by mouth once daily.    fluocinolone-shower cap 0.01 % Oil     fluticasone (FLONASE) 50 mcg/actuation nasal spray SHAKE WELL AND USE 2 SPRAYS IN EACH NOSTRIL EVERY DAY    furosemide (LASIX) 80 MG tablet Take 80 mg by mouth once daily.    guaifenesin (MUCINEX) 600 mg 12 hr tablet Take 600 mg by mouth continuous prn for Congestion. Prn      insulin needles, disposable, (BD ULTRA-FINE REJI PEN NEEDLES) 32 x 5/32 " Ndle Uses 5 daily, on multiple daily insulin injections    insulin syringe-needle U-100 1/2 mL 30 Syrg     insulin syringe-needle U-100 1/2 mL 30 Syrg USE WITH INSULIN THREE TIMES DAILY TO FOUR TIMES DAILY    isosorbide mononitrate (IMDUR) 30 MG 24 hr tablet Take 1 tablet (30 mg total) by mouth every evening.    KRILL/OM3/DHA/EPA/OM6/LIP/ASTX (KRILL OIL, OMEGA 3 & 6, ORAL) Take 1 tablet by mouth every evening.     lancets (BD ULTRA FINE LANCETS) Misc 1 Units by Misc.(Non-Drug; Combo Route) route 4 (four) times daily.    levothyroxine (SYNTHROID) 25 MCG tablet TAKE 1 TABLET(25 MCG) BY MOUTH EVERY DAY    losartan (COZAAR) 50 MG tablet TAKE 1 TABLET BY MOUTH ONCE DAILY    metolazone (ZAROXOLYN) 5 MG tablet Take 1 tablet (5 mg total) by mouth daily as needed (Weight gain of 5 pounds or more).    metoprolol tartrate (LOPRESSOR) 50 MG tablet Take 1 tablet (50 mg total) by mouth 2 (two) times daily.    MV-MN/FA/LYCOPENE/LUT/HB#178 (RAMEZ MULTIVITAMIN FOR MEN ORAL) Take by mouth.    NEVANAC 0.1 % ophthalmic suspension     nitroGLYCERIN (NITROSTAT) 0.4 MG SL tablet Place 1 tablet (0.4 mg total) under the tongue every 5 (five) minutes as " "needed.    ofloxacin (FLOXIN) 0.3 % otic solution Place 3 drops into both ears 2 (two) times daily.     ondansetron (ZOFRAN) 4 MG tablet Take 1 tablet (4 mg total) by mouth every 8 (eight) hours as needed for Nausea.    oxymetazoline (MUCINEX SINUS-MAX) 0.05 % nasal spray 2 sprays by Nasal route once daily.    pantoprazole (PROTONIX) 40 MG tablet TAKE 1 TABLET(40 MG) BY MOUTH TWICE DAILY    PATADAY 0.2 % Drop Place 1 drop into both eyes once daily.     tamsulosin (FLOMAX) 0.4 mg Cp24 TAKE 1 CAPSULE BY MOUTH EVERY DAY    triamcinolone (NASACORT) 55 mcg nasal inhaler 2 sprays by Nasal route once daily.    triamcinolone acetonide 0.1% (KENALOG) 0.1 % cream     VITAMIN D2 50,000 unit capsule TAKE 1 CAPSULE BY MOUTH 2 TIMES A WEEK    zolpidem (AMBIEN) 5 MG Tab TAKE 1 TABLET BY MOUTH EVERY EVENING    diclofenac sodium (VOLTAREN) 1 % Gel Apply 2 g topically 4 (four) times daily. Apply to both hand    gabapentin (NEURONTIN) 600 MG tablet Take 1 tablet (600 mg total) by mouth 4 (four) times daily with meals and nightly.    HUMULIN R U-500, CONCENTRATED, 500 unit/mL Soln 32 unit edgar before breakfast, 33 edgar before lunch and dinner, 32 edgar before night snack on U100 syringe. Plus correction. Max TDD 130U.    hydrocodone-acetaminophen 10-325mg (NORCO)  mg Tab Take 1 tablet by mouth every 6 (six) hours as needed for Pain.    hydrocodone-acetaminophen 10-325mg (NORCO)  mg Tab Starting on May 18, 2017. Take 1 tablet by mouth every 6 (six) hours as needed for Pain.    hydrocodone-acetaminophen 10-325mg (NORCO)  mg Tab Starting on Jun 18, 2017. Take 1 tablet by mouth every 6 (six) hours as needed for Pain.    polyethylene glycol (GLYCOLAX) 17 gram/dose powder Take 17 g by mouth once daily.           Clinical Reference Information           Your Vitals Were     BP Pulse Height Weight BMI    156/62 68 5' 8" (1.727 m) 115.2 kg (254 lb) 38.62 kg/m2      Blood Pressure          Most Recent Value    BP  (!)  156/62    "   Allergies as of 4/18/2017     Cephalexin    Penicillins    Pravastatin    Sulfa (Sulfonamide Antibiotics)    Adhesive      Immunizations Administered on Date of Encounter - 4/18/2017     None      Orders Placed During Today's Visit      Normal Orders This Visit    Ambulatory Referral to Diabetes Education     Future Labs/Procedures Expected by Expires    Hemoglobin A1c  4/18/2017 6/17/2018    Hemoglobin A1c  4/18/2017 6/17/2018    Lipid panel  4/18/2017 6/17/2018      Instructions    Snacks can be an important part of a balanced, healthy meal plan. They allow you to eat more frequently, feeling full and satisfied throughout the day. Also, they allow you to spread carbohydrates evenly, which may stabilize blood sugars.  Plus, snacks are enjoyable!     The amount of carbohydrate needed at snacks varies. Generally, about 15-30 grams of carbohydrate per snack is recommended.  Below you will find some tasty treats.       0-5 gm carb   Crystal Light   Vitamin Water Zero   Herbal tea, unsweetened   2 tsp peanut butter on celery   1./2 cup sugar-free jell-o   1 sugar-free popsicle   ¼ cup blueberries   8oz Blue Carin unsweetened almond milk   5 baby carrots & celery sticks, cucumbers, bell peppers dipped in ¼ cup salsa, 2Tbsp light ranch dressing or 2Tbsp plain Greek yogurt   10 Goldfish crackers   ½ oz low-fat cheese or string cheese   1 closed handful of nuts, unsalted   1 Tbsp of sunflower seeds, unsalted   1 cup Smart Pop popcorn   1 whole grain brown rice cake        15 gm carb   1 small piece of fruit or ½ banana or 1/2 cup lite canned fruit   3 lacy cracker squares   3 cups Smart Pop popcorn, top spray butter, Acosta lite salt or cinnamon and Truvia   5 Vanilla Wafers   ½ cup low fat, no added sugar ice cream or frozen yogurt (Blue bell, Blue Bunny, Weight Watchers, Skinny Cow)   ½ turkey, ham, or chicken sandwich   ½ c fruit with ½ c Cottage cheese   4-6 unsalted wheat crackers with 1 oz  low fat cheese or 1 tbsp peanut butter    30-45 goldfish crackers (depending on flavor)    7-8 Uatsdin mini brown rice cakes (caramel, apple cinnamon, chocolate)    12 Uatsdin mini brown rice cakes (cheddar, bbq, ranch)    1/3 cup hummus dip with raw veg   1/2 whole wheat jose miguel, 1Tbsp hummus   Mini Pizza (1/2 whole wheat English muffin, low-fat  cheese, tomato sauce)   100 calorie snack pack (Oreo, Chips Ahoy, Ritz Mix, Baked Cheetos)   4-6 oz. light or Greek Style yogurt (Chobani, Yoplait, Okios, [place])   ½ cup sugar-free pudding     6 in. wheat tortilla or jose miguel oven toasted chips (topped with spray butter flavoring, cinnamon, Truvia OR spray butter, garlic powder, chili powder)    18 BBQ Popchips (available at Target, Whole Foods, Fresh Market)                        Language Assistance Services     ATTENTION: Language assistance services are available, free of charge. Please call [phone].      ATENCIÓN: Si georgela español, tiene a wilkerson disposición servicios gratuitos de asistencia lingüística. Llame al [phone].     BAL Ý: N?u b?n nói Ti?ng Vi?t, có các d?ch v? h? tr? ngôn ng? mi?n phí dành cho b?n. G?i s? [phone].         [facility] complies with applicable Federal civil rights laws and does not discriminate on the basis of race, color, national origin, age, disability, or sex.

## 2017-04-18 NOTE — PATIENT INSTRUCTIONS
Snacks can be an important part of a balanced, healthy meal plan. They allow you to eat more frequently, feeling full and satisfied throughout the day. Also, they allow you to spread carbohydrates evenly, which may stabilize blood sugars.  Plus, snacks are enjoyable!     The amount of carbohydrate needed at snacks varies. Generally, about 15-30 grams of carbohydrate per snack is recommended.  Below you will find some tasty treats.       0-5 gm carb   Crystal Light   Vitamin Water Zero   Herbal tea, unsweetened   2 tsp peanut butter on celery   1./2 cup sugar-free jell-o   1 sugar-free popsicle   ¼ cup blueberries   8oz Blue Carin unsweetened almond milk   5 baby carrots & celery sticks, cucumbers, bell peppers dipped in ¼ cup salsa, 2Tbsp light ranch dressing or 2Tbsp plain Greek yogurt   10 Goldfish crackers   ½ oz low-fat cheese or string cheese   1 closed handful of nuts, unsalted   1 Tbsp of sunflower seeds, unsalted   1 cup Smart Pop popcorn   1 whole grain brown rice cake        15 gm carb   1 small piece of fruit or ½ banana or 1/2 cup lite canned fruit   3 lacy cracker squares   3 cups Smart Pop popcorn, top spray butter, Acosta lite salt or cinnamon and Truvia   5 Vanilla Wafers   ½ cup low fat, no added sugar ice cream or frozen yogurt (Blue bell, Blue Bunny, Weight Watchers, Skinny Cow)   ½ turkey, ham, or chicken sandwich   ½ c fruit with ½ c Cottage cheese   4-6 unsalted wheat crackers with 1 oz low fat cheese or 1 tbsp peanut butter    30-45 goldfish crackers (depending on flavor)    7-8 Worship mini brown rice cakes (caramel, apple cinnamon, chocolate)    12 Worship mini brown rice cakes (cheddar, bbq, ranch)    1/3 cup hummus dip with raw veg   1/2 whole wheat jose miguel, 1Tbsp hummus   Mini Pizza (1/2 whole wheat English muffin, low-fat  cheese, tomato sauce)   100 calorie snack pack (Oreo, Chips Ahoy, Ritz Mix, Baked Cheetos)   4-6 oz. light or Greek Style yogurt  (Shine, Chema, [facility], [facility])   ½ cup sugar-free pudding     6 in. wheat tortilla or jose miguel oven toasted chips (topped with spray butter flavoring, cinnamon, Truvia OR spray butter, garlic powder, chili powder)    18 BBQ Popchips (available at Target, Whole Foods, Fresh Market)

## 2017-04-18 NOTE — PROGRESS NOTES
Subjective:       Patient ID: Vinh Castro is a 66 y.o. male.    Chief Complaint: Follow-up    HPI Comments: 65 y/o male with hx of Morbid Obesity, poorly controlled Type 2 DM, Diabetic Nephropathy (CKD-3), Diabetic Retinopathy, Diabetic Peripheral Sensory Neuropathy, Diabetic Autonomic Neuropathy (Syncope and Hypotension), HTN, SEAN, Paroxysmal Atrial Fib on Eliquis, CAD, HFpEF, Pancreatic Insufficiency, Hypothyroidism, and Chronic Venous insufficiency comes in to discuss his stomach issues.    He was last seen 1/23/17 complaining of 2 month history of chronic nausea unrelated to meals and occurring throughout the day.. He also noted indigestion, increasing gas and belching. He denied fever, regurgitation, vomiting, chest pain, blood in stool, melena, abdominal pain. He also noted intermittent constipation. He was switched from Nexium to Protonix 40 mg BID, given prescription for Zofran, CBC, CMP, Lipase, and EGD were ordered and he was referred to GI.    He has a prior hx of Chronic Nausea from Diabetic Gastroparesis, Pancreatic Insufficiency, and GERD.  EGD (4/2014): Hyperplastic Gastric and duodenal Polyps. Bx: Chronic Active Gastritis; (-) H.pylori. He has hx of Pancreatic Insufficiency and was taking Creon TID with meals. Colonoscopy 10/2012 - tubular adenoma; Repeat Colonoscopy is due 10/2017. EUS (2/2015): Pancreatic parenchymal abnormalities consisting of atrophy, hyperechoic strands and lobularity in the entire pancreas. No evidence of a focal mass or intraductal defects. No sign of significant pathology in CBD.    EGD (2/6/17):Congested and erythematous mucosa in the gastric fundus and gastric body. Suspicious for portal hypertensive gastropathy. Multiple gastric polyps. Path: Chronic Gastritis, (-) H.pylori    He was seen by GI 2/2017 who recommended referral to Hepatology due to elevated LFS (AST-135, ALT-113) and possible portal hypertensive gastropathy findings on EGD. A CT Abdomen (2/23/17):  nodularity of liver due to possibile underlying cirrhosis. Near complete fatty replacement of pancreas, no pancreatic masses identified. GI recommended low fat diet, frequent small meals, and referral to Pancreatic GI specialist, Dr. Jacob Chris, who they saw today.    He has been followed by Hepatology for elevated Liver Enzymes. Fibrosure score (2/2017) was 0.76, F4 c/w severe cirrhosis.     He is here today with his wife and both say they are serious about losing weight and getting DM under control. They would like to see a dietician and have an appt with Endocrinology today.    BP is elevated today but he did not take his BP pills today.      PMH    1. Type 2 DM               Poorly controlled              Followed in Endocrine             Humulin U-500 Insulin - 33 QID    2. Diabetic Retinopathy     3. Diabetic Nephropathy with CKD-3              Sub-nephrotic range Proteinuria              Losartan 50 mg/day    4. Diabetic Peripheral Sensory Neuropathy             Gabapentin 600 mg TID     5. Cirrhosis of Liver - 2/2 PARRISH            Negative A1AT, Scot's, HH, AIH            Immunity to Hepatitis A but not for B            Hepatitis C is negative            Fibrosure: F4            EGD (2/2017): c/w Portal Hypertensive Gastropathy            US/CT (2/2017): nodular liver w/ splenomegaly     6. Paroxysmal Atrial Fib             CHADS-4 - on Eliquis           S/P Ablation 8/2014 & 8/2015           Followed by Dr. MIKE Galloway           Loop Recorder Implantation 5/23/16, checked monthly (NSR with occ AT)    7. CAD with HFpEF           Lasix 80 mg/day           S/P CABG 2001, PCI 2002             PET Stress Scan 11/2012 - small perfusion defect                                                          In area of D2; < 1 % of myocardium           PET Stress Test 7/21/14: no clinically significant perfusion defects, no change             Cardiac Cath 12/2014 - patent coronary arteries    8. HTN              Losartan 50  mg/day, Lopressor 50 mg BID, Lasix 80 mg daily     9. Chronic Venous Insufficency             Lasix 80 mg daily    10. Morbid Obesity (BMI-38)    11. SEAN              CPAP     12. Hyperlipidemia              Intolerant Pravastatin 80 mg/day - severe myalgia             Fish Oil -3 Omega FA 2 g/day     13. GERD              Protonix 40 mg/day    14. Pancreatic Insufficiency with Chronic Diarrhea             Confirmed on EUS 2/23/15             Creon TID with meals and snacks    15. (B) Mild ICA Stenosis                Carotid US 7/2013 < 40 % stenosis; no change from 2012     16. Hx of Kidney Stones     17. BPH, ED, Peyronie's Disease               Tamsulosin; followed in Urology clinic     18. (B) Sensorineural Hearing Loss     19. Chronic Dizziness                c/w Diabetic Autonomic Neuropathy - Neurology (Dr. Maria)                (+) Tilt Test; (-) MRA Brain 5/2014              ENT Evaluation(Dr. Marquez): (-) VNG 9/2012                CT and MRI Brain: chronic ischemic microvascular disease                Vascular Medicine (2/2013): Mild ICA stenosis                                     No evidence of VB insufficiency or Carotid Hypersensitivity     20. Allergic Rhinitis                Flonase Nasal Spray,  Allegra     21. DJD C/S, L/S, L-Hip              MRI C/S (10/2012): multilevel DJD; C5-6 Neuroforaminal Narrowing                Followed by Dr. Gaviria (Physical Medicine)              Norco  mg q 6 hrs, Gabapentin 600 mg QID    22. Chronic Anemia     23. Hx of Colonic Polyps             Colonoscopy 10/2012 - tubular adenoma; Repeat Colonoscopy 10/2017    24. Vitamin D Deficiency               Ergocalciferol 50,000 units 2 x a week    25. Depression, Anxiety, & Insomnia              Ambien 10 mg hs, Xanax     26. Glaucoma              Pataday Drops     27. Hx of Costochondritis (6/2013) - atypical chest pain - chest wall pain    28. Hypothyroidism              Synthroid 25 mcg/day      Meds:  (Unsure  of accuracy of this list as he did not bring in with him)           Albuterol Inhaler prn             Allegra           Amlodipine 5 mg/day - unclear if taking           Cholecalciferol 400 units daily           Co-enzyme Q-10 200 mg BID            Creon - 2 with meals            Eliquis (Apixaban) 5 mg BID           Ergocalciferol (ERGOCALCIFEROL) 50,000 units twice weekly             FLONASE 50 mcg/actuation nasal spray, BID                              Lasix 40 mg daily             Gabapentin 600 mg TID             Hydrocodone-acetaminophen (Norco)  mg q 6 h prn             Imdur 30 mg/day           Insulin (Humulin R) U-500 33-33-33-33           Krill Oil/[place] 3 FA             Lasix 80 mg/day           Losartan 75 mg/day - unclear if taking           Metoprolol tartrate 100 mg BID - unclear if taking           Nexium 40 mg/day           NitroGLYCERIN (NITROSTAT) 0.4 MG SL prn             Pataday eye Drops             Synthroid 25 mcg/day           Tamsulosin (FLOMAX) 0.4 mg Cp24, daily           Triamcinolone (Kenalog) Cream 0.1%             Valium 2 mg - not taking           Xanax 0.5 mg prn - not taking           Zolpidem 10 mg hs prn       Allergies:           Cephalosporins         PCN           Sulfa     Surgical Hx:           Tonsillectomy           Cholecystectomy            Inguinal Hernia Repair            CABG - LAD & OM 2001            PCI Angioplasty 2002            Cardiac Ablation for AF - 8/2014          Hand Surgery            Eye Surgery            Left Knee Arthroscopic Sx - Meniscectomy    Social Hx:          Non-smoker          ETOH: none                    Retired Manager at [employer]     Recent Lab:          Annual PE: 5/3/16          CBC (2/2017): 11.8/34.6          S. Ferritin (5/3/16): 188          TSH (1/2017): 0.724          Hgb A1c (2/2017): 11.1          Lipids (4/3/16): Chol-205, TG-248, HDL-29, LDL-126          CMP (2/20/17): BUN-28, Cr-1.7, eGFR-40, Glu-188, Alb-3.3,  AST-87, ALT-90           Fibrosure: F4           EGD (2/2017): c/w Portal Hypertensive Gastropathy           US/CT Abdomen (2/2017): nodular liver w/ splenomegaly           Review of Systems    Objective:      Physical Exam    Assessment:   1. Chronic Nausea due to GERD, Pancreatic Insufficiency, and Diabetic Gastroparesis   2. 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 Poorly controlled Type 2 DM with Diabetic Retinopathy, Nephropathy, Peripheral Sensory Neuropathy  4. Morbid Obesity  5. HTN - elevated today but did not take BP pills this morning  6. Paroxysmal Atrial Fib, on Eliquis  7. CAD without Angina  8. HFpEF  9. Pancreatic Insufficiency  10. GERD  11. CKD - Stage 3  12. SEAN   13. Chronic Venous Insufficiency  14. Hypothyroidism  15. Vit D Deficiency  16. Hx of Colon Polyps  17. Depression   18. AR  19. BPH, ED  20. Glaucoma  21. DJD Hip, C/S and L/S Spine  22. Chronic Opiate Use  23. Chronic Anemia  24. Hx of Kidney Stones  25. Chronic Constipation - aggravated by Norco, Maalox, and Zofran.  Plan:   1. D/C Maalox and Zofran. Add Metamucil BID and Colace BID. Miralax prn  2. Exercise & Weight Loss  3. Return visit to see Dr. Galloway for Atrial Fib Follow up and regarding Loop Recorder  4. Keep F/U appts with Nephrology, Endocrine (4/18/17), and Dr. Gaviria (4/18/17). Dietician appt to be scheduled by Endocrine today.  5. RTC in 6 months for follow up    45 minutes spent with patient discussing medical problems, recent diagnoses, and management.

## 2017-04-18 NOTE — MR AVS SNAPSHOT
[facility] Internal Medicine  [address]  [facility] [address]  Phone: [phone]  Fax: [phone]                  Vinh Castro   2017 11:20 AM   Office Visit    Description:  Male : 1950   Provider:  Natalee Thapa MD   Department:  [facility] - Internal Medicine           Reason for Visit     Follow-up           Diagnoses this Visit        Comments    Paroxysmal atrial fibrillation    -  Primary     Gastroesophageal reflux disease, esophagitis presence not specified         Morbid obesity with BMI of 40.0-44.9, adult         Uncontrolled type 2 diabetes mellitus with complication, with long-term current use of insulin         Cirrhosis of liver without ascites, unspecified hepatic cirrhosis type         CKD (chronic kidney disease), stage III         Pancreatic insufficiency                To Do List           Future Appointments        Provider Department Dept Phone    2017 1:20 PM Rhea Gaviria MD [facility]-Physical Med & Rehab [phone]    2017 2:00 PM LARRY Chaney [facility] - Endocrinology [phone]    2017 11:00 AM Jo Zuñiga MD [facility] - Nephrology [phone]      Goals (5 Years of Data)     None      Follow-Up and Disposition     Return in about 6 months (around 10/18/2017).      [facility]     [facility] -  Assistance  Unless otherwise directed by your provider, please contact [facility], our nurse care line that is available for  assistance.     Registered nurses in the [facility] provide: appointment scheduling, clinical advisement, health education, and other advisory services.  Call: [phone] (toll free)               Medications           Message regarding Medications     Verify the changes and/or additions to your medication regime listed below are the same as discussed with your clinician today.  If any of these changes or additions are incorrect, please notify your  "healthcare provider.        STOP taking these medications     moxifloxacin (AVELOX) 400 mg tablet TK 1 T PO QD FOR 8 DAYS           Verify that the below list of medications is an accurate representation of the medications you are currently taking.  If none reported, the list may be blank. If incorrect, please contact your healthcare provider. Carry this list with you in case of emergency.           Current Medications     alprazolam (XANAX) 0.5 MG tablet Take 1 tablet (0.5 mg total) by mouth daily as needed.    aspirin 81 MG Chew Take 81 mg by mouth once daily.    blood sugar diagnostic (ACCU-CHEK JANEY PLUS TEST STRP) Strp Patient to check blood sugar 6 x's per day--Dx Code 250.62,  357.2    calcium carbonate-simethicone (MAALOX ADVANCED) 1,000-60 mg Chew Take 2 tablets by mouth 3 (three) times daily as needed.    cholecalciferol, vitamin D3, (VITAMIN D3) 5,000 unit Tab Take 5,000 Units by mouth. Takes one tablet every day except on Sun and Wed    ciprofloxacin HCl (CILOXAN) 0.3 % ophthalmic solution     CREON 24,000-76,000 -120,000 unit capsule TAKE 2 CAPSULES BY MOUTH THREE TIMES DAILY WITH MEALS AND 1 CAPSULE WITH A SNACK    DEXTROSE (GLUCOSE) Chew Take by mouth as needed.    ELIQUIS 5 mg Tab TAKE 1 TABLET BY MOUTH TWICE DAILY    ferrous sulfate 325 mg (65 mg iron) Tab tablet TAKE 1 TABLET BY MOUTH EVERY DAY    fexofenadine (ALLEGRA) 180 MG tablet Take 180 mg by mouth once daily.    fluocinolone-shower cap 0.01 % Oil     fluticasone (FLONASE) 50 mcg/actuation nasal spray SHAKE WELL AND USE 2 SPRAYS IN EACH NOSTRIL EVERY DAY    furosemide (LASIX) 80 MG tablet Take 80 mg by mouth once daily.    guaifenesin (MUCINEX) 600 mg 12 hr tablet Take 600 mg by mouth continuous prn for Congestion. Prn      HUMULIN R U-500, CONCENTRATED, 500 unit/mL Soln INJECT INTO SKIN 32 UNIT JORDY ON REGULAR U-100 SYRINGE BEFORE MEALS AND WITH EVENING SNACK    insulin needles, disposable, (BD ULTRA-FINE REJI PEN NEEDLES) 32 x 5/32 " Ndle " Uses 5 daily, on multiple daily insulin injections    insulin syringe-needle U-100 1/2 mL 30 Syrg     insulin syringe-needle U-100 1/2 mL 30 Syrg USE WITH INSULIN THREE TIMES DAILY TO FOUR TIMES DAILY    isosorbide mononitrate (IMDUR) 30 MG 24 hr tablet Take 1 tablet (30 mg total) by mouth every evening.    KRILL/OM3/DHA/EPA/OM6/LIP/ASTX (KRILL OIL, OMEGA 3 & 6, ORAL) Take 1 tablet by mouth every evening.     lancets (BD ULTRA FINE LANCETS) Misc 1 Units by Misc.(Non-Drug; Combo Route) route 4 (four) times daily.    levothyroxine (SYNTHROID) 25 MCG tablet TAKE 1 TABLET(25 MCG) BY MOUTH EVERY DAY    losartan (COZAAR) 50 MG tablet TAKE 1 TABLET BY MOUTH ONCE DAILY    metolazone (ZAROXOLYN) 5 MG tablet Take 1 tablet (5 mg total) by mouth daily as needed (Weight gain of 5 pounds or more).    metoprolol tartrate (LOPRESSOR) 50 MG tablet Take 1 tablet (50 mg total) by mouth 2 (two) times daily.    MV-MN/FA/LYCOPENE/LUT/HB#178 (RAMEZ MULTIVITAMIN FOR MEN ORAL) Take by mouth.    NEVANAC 0.1 % ophthalmic suspension     nitroGLYCERIN (NITROSTAT) 0.4 MG SL tablet Place 1 tablet (0.4 mg total) under the tongue every 5 (five) minutes as needed.    ofloxacin (FLOXIN) 0.3 % otic solution Place 3 drops into both ears 2 (two) times daily.     ondansetron (ZOFRAN) 4 MG tablet Take 1 tablet (4 mg total) by mouth every 8 (eight) hours as needed for Nausea.    oxymetazoline (MUCINEX SINUS-MAX) 0.05 % nasal spray 2 sprays by Nasal route once daily.    pantoprazole (PROTONIX) 40 MG tablet TAKE 1 TABLET(40 MG) BY MOUTH TWICE DAILY    PATADAY 0.2 % Drop Place 1 drop into both eyes once daily.     tamsulosin (FLOMAX) 0.4 mg Cp24 TAKE 1 CAPSULE BY MOUTH EVERY DAY    triamcinolone (NASACORT) 55 mcg nasal inhaler 2 sprays by Nasal route once daily.    triamcinolone acetonide 0.1% (KENALOG) 0.1 % cream     VITAMIN D2 50,000 unit capsule TAKE 1 CAPSULE BY MOUTH 2 TIMES A WEEK    zolpidem (AMBIEN) 5 MG Tab TAKE 1 TABLET BY MOUTH EVERY EVENING     "diclofenac sodium (VOLTAREN) 1 % Gel Apply 2 g topically 4 (four) times daily. Apply to both hand    gabapentin (NEURONTIN) 600 MG tablet Take 1 tablet (600 mg total) by mouth 4 (four) times daily with meals and nightly.    hydrocodone-acetaminophen 10-325mg (NORCO)  mg Tab Take 1 tablet by mouth 4 (four) times daily.    polyethylene glycol (GLYCOLAX) 17 gram/dose powder Take 17 g by mouth once daily.           Clinical Reference Information           Your Vitals Were     BP Pulse Height Weight BMI    161/72 (BP Location: Left arm, Patient Position: Sitting, BP Method: Automatic) 62 5' 8" (1.727 m) 115.1 kg (253 lb 12 oz) 38.58 kg/m2      Blood Pressure          Most Recent Value    BP  (!)  161/72      Allergies as of 4/18/2017     Cephalexin    Penicillins    Pravastatin    Sulfa (Sulfonamide Antibiotics)    Adhesive      Immunizations Administered on Date of Encounter - 4/18/2017     None      Orders Placed During Today's Visit      Normal Orders This Visit    Ambulatory consult to Electrophysiology       Language Assistance Services     ATTENTION: Language assistance services are available, free of charge. Please call [phone].      ATENCIÓN: Si habla sumanth, tiene a wilkerson disposición servicios gratuitos de asistencia lingüística. Llame al [phone].     BAL Ý: N?u b?n nói Ti?ng Vi?t, có các d?ch v? h? tr? ngôn ng? mi?n phí dành cho b?n. G?i s? [phone].         [facility] complies with applicable Federal civil rights laws and does not discriminate on the basis of race, color, national origin, age, disability, or sex.        "

## 2017-04-18 NOTE — PROGRESS NOTES
CC: Mr. Vinh Castro arrives today for management of Type 2DM and review of chronic medical conditions.     HPI: Mr. Vinh Castro was diagnosed with Type 2 DM in his 40s on routine lab work.   He started treatment on Glucophage only and within a few years was using insulin 70/30, then MDI.  He had completed DM Empowerment, where he was placed on U-500 concentrated insulin, and was referred to DM specialty clinic.    He denies any hospitalizations related to DM.   DM complicated by neuropathy, NPDR, and nephropathy most likely d/t DM.     He has a long-standing h/o of poorly controlled DM; A1c most often in 9% range.   He has consistently reported compliance with concentrated insulin doses, though timing and meal sizes vary.     Mr. Castro is a complex patient with multiple co-morbidities: DM with multiple manifestations as noted, HTN, HLD, CAD, post-PCI and CABG 2001, h/o atrial arrhythmias w/ ablations, PAD, HF with preserved LV function, PARRISH, pancreatic insufficieny with chronic diarrhea, SEAN, chronic orthopedic issues, chronic sinusitis, anxiety, and morbid obesity. He has had multiple cardiac admissions.  Follows with hepatology, cardiology, gastroenterology, and nephrology.      + hypothyroidism as well. On LT4 per PCP.    HLD- Pravastatin d/c'd 2 months ago for myalgias. States has has also tried Lipitor and another statin with the same effect. Does not want another statin.     BG readings are checked 4x/day.     He mentions that his BG are better when he takes his insulin.   Fasting 60, 101, 106, 170s- 210s  Lunch 76, [id number],   Dinner 73, 220s- 366, 443  HS 190s, 240s- 300s    Hypoglycemia: Infrequent,.  Hypoglycemic Symptoms: sweaty, jitteriness  Hypoglycemia Treatment: glucose tabs    Missing Insulin/PO medication doses: Denies  Prandial Insulin Injections Taken with Meals: Yes    Exercise: No    Dietary Habits: 3 meals a day, + snacks. Attests to large variability in CHO content of meals and snacks.  "    Polyuria: No  Polydipsia: No  Polyphagia: No    CURRENT DIABETIC MEDS: U-500 32 units QID. Takes more at times, as high as 40 units depending on BG or meal.  Type of Glucose Meter: Accu-check Verena    Last Eye Exam: UTD, outside facility, reports +DR    REVIEW OF SYSTEMS  Personally reviewed past medical, social, and family history.   General: wt is stable. No fever  Eyes: denies blurry vision, eye pain, or redness.   ENT: Recurrent sinus infections.   Cardiac: denies palpitations or any chest pain.   Respiratory:  denies cough or dyspnea.  GI: reports chronic bloating and intermittent nausea for which he follows with hepatology.   Skin: no rashes, or itching  Neuro: + numbness, tingling in feet bilaterally.  MS: + chronic back pain    Vital Signs  Personally reviewed the labs noted below.     BP (!) 156/62  Pulse 68  Ht 5' 8" (1.727 m)  Wt 115.2 kg (254 lb)  BMI 38.62 kg/m2  Hemoglobin A1C   Date Value Ref Range Status   02/14/2017 11.1 (H) 4.5 - 6.2 % Final     Comment:     According to ADA guidelines, hemoglobin A1C <7.0% represents  optimal control in non-pregnant diabetic patients.  Different  metrics may apply to specific populations.   Standards of Medical Care in Diabetes - 2016.  For the purpose of screening for the presence of diabetes:  <5.7%     Consistent with the absence of diabetes  5.7-6.4%  Consistent with increasing risk for diabetes   (prediabetes)  >or=6.5%  Consistent with diabetes  Currently no consensus exists for use of hemoglobin A1C  for diagnosis of diabetes for children.     01/24/2017 11.3 (H) 4.5 - 6.2 % Final     Comment:     According to ADA guidelines, hemoglobin A1C <7.0% represents  optimal control in non-pregnant diabetic patients.  Different  metrics may apply to specific populations.   Standards of Medical Care in Diabetes - 2016.  For the purpose of screening for the presence of diabetes:  <5.7%     Consistent with the absence of diabetes  5.7-6.4%  Consistent with " increasing risk for diabetes   (prediabetes)  >or=6.5%  Consistent with diabetes  Currently no consensus exists for use of hemoglobin A1C  for diagnosis of diabetes for children.     10/18/2016 10.8 (H) 4.5 - 6.2 % Final     Comment:     According to ADA guidelines, hemoglobin A1C <7.0% represents  optimal control in non-pregnant diabetic patients.  Different  metrics may apply to specific populations.   Standards of Medical Care in Diabetes - 2016.  For the purpose of screening for the presence of diabetes:  <5.7%     Consistent with the absence of diabetes  5.7-6.4%  Consistent with increasing risk for diabetes   (prediabetes)  >or=6.5%  Consistent with diabetes  Currently no consensus exists for use of hemoglobin A1C  for diagnosis of diabetes for children.         Lab Results   Component Value Date    CHOL 205 (H) 04/30/2016    CHOL 153 02/25/2016    CHOL 165 10/28/2015     Lab Results   Component Value Date    HDL 29 (L) 04/30/2016    HDL 35 (L) 02/25/2016    HDL 36 (L) 10/28/2015     Lab Results   Component Value Date    LDLCALC 126.4 04/30/2016    LDLCALC 51.4 (L) 02/25/2016    LDLCALC 56.0 (L) 10/28/2015     Lab Results   Component Value Date    TRIG 248 (H) 04/30/2016    TRIG 333 (H) 02/25/2016    TRIG 365 (H) 10/28/2015     Lab Results   Component Value Date    CHOLHDL 14.1 (L) 04/30/2016    CHOLHDL 22.9 02/25/2016    CHOLHDL 21.8 10/28/2015     Lab Results   Component Value Date    TSH 0.724 01/24/2017     PHYSICAL EXAMINATION  Constitutional: Appears well, no distress, obese  Neck: Supple, trachea midline, no thyromegaly  Respiratory: CTA, even and unlabored, no wheezes or rales  Cardiovascular: RRR, no murmurs  Lymph: DP pulses  2+ bilaterally; trace edema  Skin: warm and dry; no rash, petechiae, ecchymosis, or acanthosis nigracans observed.   Neuro: cranial nerves 2-12 grossly intact; alert and cooperative.  Feet:  Protective Sensation (w/ 10 gram monofilament):  Right: Decreased  Left:  Decreased    Visual Inspection:  Normal -  Bilateral    Pedal Pulses:   Right: Present  Left: Present    Posterior tibialis:   Right:Present  Left: Present    Assessment/Plan  Type II diabetes mellitus with CV, neuro, renal, other specified manifestations, uncontrolled  - Lenient A1c goal of < 8% given multiple co-morbidities. Avoid hypoglycemia.   - A1c today.     - Reviewed BG variability at length. Stressed CHO portion control. Reviewed CHO portion recommendation.   DM education for related CHO education. Discussed that he will need dedicated follow with DM ed as diet is the foundation of BG management.     - U500: 32-33-33-32  Plus correction: > 200 +1, > 300 +3, > 400 +4  - BG AC/HS.     - Hold consideration of DPP4, GLP given bloating and chronic diarrhea. Consider RTC.   - No SGLT2i given renal function.     - Encouraged pt to contact office with any questions concerns or persistent BG elevations or hypoglycemia.  - On ASA, ARB, statin       Mild non-proliferative diabetic retinopathy - Follows with Ophth  - Optimize BG management.  - f/u appointment tomorrow,.        Diabetic nephropathy; CKD 3.  Elevated serum creatinine - Follows with nephrology  - Optimize BG control.       Diabetic neuropathy - On gabapentin.   - Optimize BG control       Hypothyroid - On LT4 per PCP.   Clinically/chemically euthyroid.         HTN (hypertension)  - Above goal, continue BP medications as directed       Hyperlipidemia  - On fish oil per PCP.   - Myalgias with statins. Last taking pravastatin- d/c'd 2 months ago.    - triglycerides lower, but remain elevated: optimize BG control to address.     Morbid obesity with BMI of 40.0-44.9, adult  - Body mass index is 38.62 kg/(m^2). Increases insulin resistance.   He declines wt management referral.       PARRISH presumed - Follows with Hepatology.      Orders Placed This Encounter   Procedures    Hemoglobin A1c    Hemoglobin A1c    Lipid panel    Ambulatory Referral to Diabetes  Education     FOLLOW UP  Return in about 3 months (around 7/18/2017).

## 2017-04-18 NOTE — PROGRESS NOTES
"Pt here for endocrine NP visit today. Received request to see pt for basic diet education today d/t pt lives 2 hours away. Reviewed importance of consistent CHO diet to compliment insulin therapy and reducing BG variability after/between meals. Provided brief education on consistent CHO diet - sources of CHO, portion sizes demonstrated with measuring cups and food models, label reading, meal planning, and rec'd eating pattern of 45-60g CHO/meal, 3 meals daily. Encouraged limiting snacking on CHO between meals, choosing more 0-5g CHO during the day. Encouraged pt to have a 15-20g CHO snack + protein at night before bed d/t U-500 evening dose and reports sometimes having BG readings in 60s in am. Also reviewed appropriate hypoglycemia treatment with "rule of 15" and eating 15g CHO snack + protein after BG back above 70 if not eating meal for >1 hour. Discussed also 2000mg sodium diet guidelines and limiting high fat and fried foods d/t kidney and liver complications.  "

## 2017-04-18 NOTE — PROGRESS NOTES
Subjective:      Patient ID: Vinh Castro is a 66 y.o. male.    Chief Complaint: GI Problem (pancreatic insufficiency)      HPI:  Vinh Castro is a 66 y.o. male who is diagnosed with pancreatic atrophy on EUS and CT with symptoms suggestive of pancreatic insufficiency who has been on creon for few years now. Patient is a poor historian. He knows that creon has helped him but not sure in what ways. He complains of constipation, nausea and epigastric pain. Zofran does help his nausea. He believes that when he has a BM his symptoms improve. EGD was performed last month with no PUD. No weight loss.     Review of patient's allergies indicates:   Allergen Reactions    Cephalexin Hives and Shortness Of Breath    Penicillins Other (See Comments)     Unknown  Hardened skin    Pravastatin Other (See Comments)     myalgia    Sulfa (sulfonamide antibiotics)      Other reaction(s): Unknown    Adhesive Rash       Current Outpatient Prescriptions   Medication Sig Dispense Refill    alprazolam (XANAX) 0.5 MG tablet Take 1 tablet (0.5 mg total) by mouth daily as needed. 30 tablet 0    aspirin 81 MG Chew Take 81 mg by mouth once daily.      blood sugar diagnostic (ACCU-CHEK JANEY PLUS TEST STRP) Strp Patient to check blood sugar 6 x's per day--Dx Code 250.62,  357.2 600 each 1    calcium carbonate-simethicone (MAALOX ADVANCED) 1,000-60 mg Chew Take 2 tablets by mouth 3 (three) times daily as needed.      cholecalciferol, vitamin D3, (VITAMIN D3) 5,000 unit Tab Take 5,000 Units by mouth. Takes one tablet every day except on Sun and Wed      ciprofloxacin HCl (CILOXAN) 0.3 % ophthalmic solution       CREON 24,000-76,000 -120,000 unit capsule TAKE 2 CAPSULES BY MOUTH THREE TIMES DAILY WITH MEALS AND 1 CAPSULE WITH A SNACK 210 capsule 3    DEXTROSE (GLUCOSE) Chew Take by mouth as needed.      ELIQUIS 5 mg Tab TAKE 1 TABLET BY MOUTH TWICE DAILY 60 tablet 5    ferrous sulfate 325 mg (65 mg iron) Tab tablet TAKE 1 TABLET BY  "MOUTH EVERY DAY 90 tablet 0    fexofenadine (ALLEGRA) 180 MG tablet Take 180 mg by mouth once daily.      fluocinolone-shower cap 0.01 % Oil       fluticasone (FLONASE) 50 mcg/actuation nasal spray SHAKE WELL AND USE 2 SPRAYS IN EACH NOSTRIL EVERY DAY 1 Bottle 5    furosemide (LASIX) 80 MG tablet Take 80 mg by mouth once daily.      guaifenesin (MUCINEX) 600 mg 12 hr tablet Take 600 mg by mouth continuous prn for Congestion. Prn        HUMULIN R U-500, CONCENTRATED, 500 unit/mL Soln INJECT INTO SKIN 32 UNIT JORDY ON REGULAR U-100 SYRINGE BEFORE MEALS AND WITH EVENING SNACK 20 mL 3    insulin needles, disposable, (BD ULTRA-FINE REJI PEN NEEDLES) 32 x 5/32 " Ndle Uses 5 daily, on multiple daily insulin injections 150 each 1    insulin syringe-needle U-100 1/2 mL 30 Syrg       insulin syringe-needle U-100 1/2 mL 30 Syrg USE WITH INSULIN THREE TIMES DAILY TO FOUR TIMES DAILY 400 each 2    isosorbide mononitrate (IMDUR) 30 MG 24 hr tablet Take 1 tablet (30 mg total) by mouth every evening. 30 tablet 11    KRILL/OM3/DHA/EPA/OM6/LIP/ASTX (KRILL OIL, OMEGA 3 & 6, ORAL) Take 1 tablet by mouth every evening.       lancets (BD ULTRA FINE LANCETS) Misc 1 Units by Misc.(Non-Drug; Combo Route) route 4 (four) times daily. 200 each 11    levothyroxine (SYNTHROID) 25 MCG tablet TAKE 1 TABLET(25 MCG) BY MOUTH EVERY DAY 30 tablet 5    losartan (COZAAR) 50 MG tablet TAKE 1 TABLET BY MOUTH ONCE DAILY 90 tablet 3    metolazone (ZAROXOLYN) 5 MG tablet Take 1 tablet (5 mg total) by mouth daily as needed (Weight gain of 5 pounds or more). 30 tablet 6    metoprolol tartrate (LOPRESSOR) 50 MG tablet Take 1 tablet (50 mg total) by mouth 2 (two) times daily. 60 tablet 11    moxifloxacin (AVELOX) 400 mg tablet TK 1 T PO QD FOR 8 DAYS  0    MV-MN/FA/LYCOPENE/LUT/HB#178 (RAMEZ MULTIVITAMIN FOR MEN ORAL) Take by mouth.      NEVANAC 0.1 % ophthalmic suspension       nitroGLYCERIN (NITROSTAT) 0.4 MG SL tablet Place 1 tablet (0.4 mg " total) under the tongue every 5 (five) minutes as needed. 25 tablet 6    ofloxacin (FLOXIN) 0.3 % otic solution Place 3 drops into both ears 2 (two) times daily.       ondansetron (ZOFRAN) 4 MG tablet Take 1 tablet (4 mg total) by mouth every 8 (eight) hours as needed for Nausea. 30 tablet 0    oxymetazoline (MUCINEX SINUS-MAX) 0.05 % nasal spray 2 sprays by Nasal route once daily.      pantoprazole (PROTONIX) 40 MG tablet TAKE 1 TABLET(40 MG) BY MOUTH TWICE DAILY 60 tablet 3    PATADAY 0.2 % Drop Place 1 drop into both eyes once daily.       tamsulosin (FLOMAX) 0.4 mg Cp24 TAKE 1 CAPSULE BY MOUTH EVERY DAY 90 capsule 5    triamcinolone (NASACORT) 55 mcg nasal inhaler 2 sprays by Nasal route once daily.      triamcinolone acetonide 0.1% (KENALOG) 0.1 % cream       VITAMIN D2 50,000 unit capsule TAKE 1 CAPSULE BY MOUTH 2 TIMES A WEEK 24 capsule 2    zolpidem (AMBIEN) 5 MG Tab TAKE 1 TABLET BY MOUTH EVERY EVENING 30 tablet 0    diclofenac sodium (VOLTAREN) 1 % Gel Apply 2 g topically 4 (four) times daily. Apply to both hand 3 Tube 2    gabapentin (NEURONTIN) 600 MG tablet Take 1 tablet (600 mg total) by mouth 4 (four) times daily with meals and nightly. 360 tablet 2    hydrocodone-acetaminophen 10-325mg (NORCO)  mg Tab Take 1 tablet by mouth 4 (four) times daily. 120 tablet 0    polyethylene glycol (GLYCOLAX) 17 gram/dose powder Take 17 g by mouth once daily. 1 Bottle 3     No current facility-administered medications for this visit.        Review of Systems    Objective:     Physical Exam   Constitutional: He is oriented to person, place, and time. No distress.   HENT:   Head: Normocephalic.   Eyes: Conjunctivae and EOM are normal. Pupils are equal, round, and reactive to light. No scleral icterus.   Neck: Normal range of motion. No thyromegaly present.   Cardiovascular: Normal rate, regular rhythm and normal heart sounds.  Exam reveals no gallop and no friction rub.    No murmur  heard.  Pulmonary/Chest: Breath sounds normal. No respiratory distress. He has no wheezes.   Abdominal: Soft. Bowel sounds are normal. He exhibits no distension. There is no rebound and no guarding.   Epigastric tenderness   Lymphadenopathy:     He has no cervical adenopathy.   Neurological: He is alert and oriented to person, place, and time. No cranial nerve deficit.   Skin: Skin is warm and dry. No rash noted. He is not diaphoretic.   Psychiatric: He has a normal mood and affect. His behavior is normal.       Assessment:   Pancreatic insufficiency    Patient on creon  Symptoms of nausea, epigastric pain and constipation    Plan:   Patient is on creon, pantoprazole and zofran which seem to help his symptoms.  His constipation (may be worsened by zofran) could also be contributing since patient does feel better with BM. He will take miralax daily.  No need for further endoscopic workup at this point.  Dexa scan given panc insuff and body habitus.  Follow with GI.

## 2017-04-18 NOTE — PROGRESS NOTES
""This note will not be shared with the patient."Subjective:       Patient ID: Vinh Castro is a 66 y.o. male.    Chief Complaint: Back Pain; Hip Pain; and Hand Pain    Back Pain   Associated symptoms include leg pain. Pertinent negatives include no abdominal pain, chest pain, fever or headaches.   Hip Pain      Hand Pain    Pertinent negatives include no chest pain.   Neck Pain    Associated symptoms include leg pain. Pertinent negatives include no chest pain, fever, headaches or trouble swallowing.   Leg Pain      Shoulder Pain    Pertinent negatives include no fever or headaches.      Subjective:       Patient ID: Vinh Castro is a 66 y.o. male.    Chief Complaint: Back Pain; Hip Pain; and Hand Pain  Mr. Castro returns  to clinic for  chronic neck, back and Rt hand pain.  LCV 1/20/17.    #1 Back pain.  Current low back pain is 5, worst pain 7-8.   Back pain runs to left hip and does not radiate that much down the left leg to knee and bellow the knee, at outside aspect of leg, but not to ankle/ foot.   As before. Pain in left hip is still present as  burning pain. He has diabetic polyneuropathy.  He reports back and hip pain being constant, burning pain.   Sitting  makes pain worse, lying down improves the pain.  Also pain with walking, able to walk  ~ 2 blocks.  No true weakness in legs, no numbness, paraesthesias.  No BB incontinence., no saddle anesthesia.    #2 Rt hand pain is difficult to close.   Not that much of pain, how much is partial hand weakness in pinky and ring finger flexion.  NO numbness, no burning sensation.  The weakness he is showing is more c/w tenosynovitis.  He has DM2 polyneuropathy.  He states that he was told at some time that he might have a CTS.  Has brace at home, that does not help.   Things are dropping out of his hand.       Takes Gabapentin 600 mg po TID, that is helping. Asking if he can get a 4th one, if his pain is   Tolerates well Hydrocodone,and wants to continue the same " medications.   He completed Physical therapy, and now doing exercises at home.   Here for follow up, re evaluation, and medications adjustment.    Past Medical History:   Diagnosis Date    *Atrial fibrillation     Acquired hypothyroidism 5/4/2016    Allergy     Anticoagulant long-term use     Arthritis     CAD (coronary artery disease)     CABG 2002, PCI    CHF (congestive heart failure)     Chronic anticoagulation     Coronary artery disease     Depression     Diabetic nephropathy     Diabetic retinopathy associated with type 2 diabetes mellitus     Diverticulosis     Glaucoma     patient denies having glaucoma    History of BPH     History of peripheral vascular disease     Bilateral Carotid Artery Stenosis < 40% on Carotid US 7/2012    Hyperlipidemia     Hypertension     Obesity     SEAN (obstructive sleep apnea)     uses C-PAP    Pancreatic insufficiency     Personal history of kidney stones     PN (peripheral neuropathy)     Syncope 5/23/2016    Type 2 diabetes mellitus not at goal        Past Surgical History:   Procedure Laterality Date    ABDOMINAL SURGERY      CATARACT EXTRACTION BILATERAL W/ ANTERIOR VITRECTOMY      COLONOSCOPY      CORONARY ANGIOPLASTY  2014    Patent LIMA to LAD, SVG to OM, occluded LAD, 60% Circ-x1cv stent placed    CORONARY ARTERY BYPASS GRAFT  2001    iyer LAD SVG OM1    EYE SURGERY      retina repair    GALLBLADDER SURGERY      HAND SURGERY      LOST THREE FINGERS/left 1979    HERNIA REPAIR      JOINT REPLACEMENT      RADIOFREQUENCY ABLATION  8/14    atrial flutter    RETINAL LASER PROCEDURE      SKIN BIOPSY      head    TONSILLECTOMY      UPPER GASTROINTESTINAL ENDOSCOPY         Family History   Problem Relation Age of Onset    Cancer Mother      Rhumatoid lung disease    Diabetes Father     Heart attack Father     Heart disease Father     Cancer Father      Lymphona    Obesity Sister     Diabetes Paternal Grandfather     Anesthesia  problems Neg Hx        Social History     Social History    Marital status:      Spouse name: Sandra    Number of children: 1    Years of education: N/A     Social History Main Topics    Smoking status: Never Smoker    Smokeless tobacco: Never Used    Alcohol use No    Drug use: No    Sexual activity: No     Other Topics Concern    None     Social History Narrative    Work: Manager at [employer] Mail activity: None.Hobbies: Fishing.       Current Outpatient Prescriptions   Medication Sig Dispense Refill    alprazolam (XANAX) 0.5 MG tablet Take 1 tablet (0.5 mg total) by mouth daily as needed. 30 tablet 0    aspirin 81 MG Chew Take 81 mg by mouth once daily.      blood sugar diagnostic (ACCU-CHEK JANEY PLUS TEST STRP) Strp Patient to check blood sugar 6 x's per day--Dx Code 250.62,  357.2 600 each 1    calcium carbonate-simethicone (MAALOX ADVANCED) 1,000-60 mg Chew Take 2 tablets by mouth 3 (three) times daily as needed.      cholecalciferol, vitamin D3, (VITAMIN D3) 5,000 unit Tab Take 5,000 Units by mouth. Takes one tablet every day except on Sun and Wed      ciprofloxacin HCl (CILOXAN) 0.3 % ophthalmic solution       CREON 24,000-76,000 -120,000 unit capsule TAKE 2 CAPSULES BY MOUTH THREE TIMES DAILY WITH MEALS AND 1 CAPSULE WITH A SNACK 210 capsule 3    DEXTROSE (GLUCOSE) Chew Take by mouth as needed.      diclofenac sodium (VOLTAREN) 1 % Gel Apply 2 g topically 4 (four) times daily. Apply to both hand 3 Tube 2    ELIQUIS 5 mg Tab TAKE 1 TABLET BY MOUTH TWICE DAILY 60 tablet 5    ferrous sulfate 325 mg (65 mg iron) Tab tablet TAKE 1 TABLET BY MOUTH EVERY DAY 90 tablet 0    fexofenadine (ALLEGRA) 180 MG tablet Take 180 mg by mouth once daily.      fluocinolone-shower cap 0.01 % Oil       fluticasone (FLONASE) 50 mcg/actuation nasal spray SHAKE WELL AND USE 2 SPRAYS IN EACH NOSTRIL EVERY DAY 1 Bottle 5    furosemide (LASIX) 80 MG tablet Take 80 mg by mouth once daily.      gabapentin  "(NEURONTIN) 600 MG tablet Take 1 tablet (600 mg total) by mouth 4 (four) times daily with meals and nightly. 360 tablet 1    guaifenesin (MUCINEX) 600 mg 12 hr tablet Take 600 mg by mouth continuous prn for Congestion. Prn        hydrocodone-acetaminophen 10-325mg (NORCO)  mg Tab Take 1 tablet by mouth every 6 (six) hours as needed for Pain. 120 tablet 0    [START ON 5/18/2017] hydrocodone-acetaminophen 10-325mg (NORCO)  mg Tab Take 1 tablet by mouth every 6 (six) hours as needed for Pain. 120 tablet 0    [START ON 6/18/2017] hydrocodone-acetaminophen 10-325mg (NORCO)  mg Tab Take 1 tablet by mouth every 6 (six) hours as needed for Pain. 120 tablet 0    insulin needles, disposable, (BD ULTRA-FINE REJI PEN NEEDLES) 32 x 5/32 " Ndle Uses 5 daily, on multiple daily insulin injections 150 each 1    insulin syringe-needle U-100 1/2 mL 30 Syrg       insulin syringe-needle U-100 1/2 mL 30 Syrg USE WITH INSULIN THREE TIMES DAILY TO FOUR TIMES DAILY 400 each 2    isosorbide mononitrate (IMDUR) 30 MG 24 hr tablet Take 1 tablet (30 mg total) by mouth every evening. 30 tablet 11    KRILL/OM3/DHA/EPA/OM6/LIP/ASTX (KRILL OIL, OMEGA 3 & 6, ORAL) Take 1 tablet by mouth every evening.       lancets (BD ULTRA FINE LANCETS) Misc 1 Units by Misc.(Non-Drug; Combo Route) route 4 (four) times daily. 200 each 11    levothyroxine (SYNTHROID) 25 MCG tablet TAKE 1 TABLET(25 MCG) BY MOUTH EVERY DAY 30 tablet 5    losartan (COZAAR) 50 MG tablet TAKE 1 TABLET BY MOUTH ONCE DAILY 90 tablet 3    metolazone (ZAROXOLYN) 5 MG tablet Take 1 tablet (5 mg total) by mouth daily as needed (Weight gain of 5 pounds or more). 30 tablet 6    metoprolol tartrate (LOPRESSOR) 50 MG tablet Take 1 tablet (50 mg total) by mouth 2 (two) times daily. 60 tablet 11    MV-MN/FA/LYCOPENE/LUT/HB#178 (RAMEZ MULTIVITAMIN FOR MEN ORAL) Take by mouth.      NEVANAC 0.1 % ophthalmic suspension       nitroGLYCERIN (NITROSTAT) 0.4 MG SL tablet " Place 1 tablet (0.4 mg total) under the tongue every 5 (five) minutes as needed. 25 tablet 6    ofloxacin (FLOXIN) 0.3 % otic solution Place 3 drops into both ears 2 (two) times daily.       ondansetron (ZOFRAN) 4 MG tablet Take 1 tablet (4 mg total) by mouth every 8 (eight) hours as needed for Nausea. 30 tablet 0    oxymetazoline (MUCINEX SINUS-MAX) 0.05 % nasal spray 2 sprays by Nasal route once daily.      pantoprazole (PROTONIX) 40 MG tablet TAKE 1 TABLET(40 MG) BY MOUTH TWICE DAILY 60 tablet 3    PATADAY 0.2 % Drop Place 1 drop into both eyes once daily.       polyethylene glycol (GLYCOLAX) 17 gram/dose powder Take 17 g by mouth once daily. 1 Bottle 3    tamsulosin (FLOMAX) 0.4 mg Cp24 TAKE 1 CAPSULE BY MOUTH EVERY DAY 90 capsule 5    triamcinolone (NASACORT) 55 mcg nasal inhaler 2 sprays by Nasal route once daily.      triamcinolone acetonide 0.1% (KENALOG) 0.1 % cream       VITAMIN D2 50,000 unit capsule TAKE 1 CAPSULE BY MOUTH 2 TIMES A WEEK 24 capsule 2    zolpidem (AMBIEN) 5 MG Tab TAKE 1 TABLET BY MOUTH EVERY EVENING 30 tablet 0    HUMULIN R U-500, CONCENTRATED, 500 unit/mL Soln 32 unit edgar before breakfast, 33 edgar before lunch and dinner, 32 edgar before night snack on U100 syringe. Plus correction. Max TDD 130U. 20 mL 3     No current facility-administered medications for this visit.        Review of patient's allergies indicates:   Allergen Reactions    Cephalexin Hives and Shortness Of Breath    Penicillins Other (See Comments)     Unknown  Hardened skin    Pravastatin Other (See Comments)     myalgia    Sulfa (sulfonamide antibiotics)      Other reaction(s): Unknown    Adhesive Rash           Review of Systems   Constitutional: Negative for activity change, appetite change, chills, diaphoresis, fatigue, fever and unexpected weight change.   HENT: Negative for trouble swallowing and voice change.    Eyes: Negative for pain and visual disturbance.   Respiratory: Negative for chest  tightness, shortness of breath and wheezing.    Cardiovascular: Negative for chest pain, palpitations and leg swelling.   Gastrointestinal: Negative for abdominal pain, constipation and diarrhea.   Genitourinary: Negative for difficulty urinating, frequency and urgency.   Musculoskeletal: Positive for back pain and neck pain. Negative for arthralgias, joint swelling, myalgias and neck stiffness.   Skin: Negative for rash and wound.   Neurological: Negative for dizziness, facial asymmetry, speech difficulty, light-headedness and headaches.   Hematological: Negative for adenopathy.   Psychiatric/Behavioral: Negative for agitation, behavioral problems, confusion, decreased concentration, dysphoric mood and sleep disturbance.         Objective:      Physical Exam    GENERAL: The patient is alert, oriented, pleasant, obese.   MUSCULOSKELETAL:   Gait is improved, stronger, walks without cane, on wide basis, small steps, improved balance.  Cervical spine full range of motion in all planes.   Lumbar spine, decreased active range of motion in all planes, flexion to 90 degrees,    Side bending and rotation to 20-25 degrees bilateral.   Positive facet loading bilateral..  Straight leg raising positive bilaterally.   Full range of motion in all joints x4 extremities.   Muscle strength 5/5 throughout x4 extremities.   No  joint laxity throughout x4 extremities.   Rt  hand  incomplete, weak, can bend all fingers but pinky and ring finger flexion is delayed.  NEUROLOGIC: Cranial nerves II through XII intact.   Deep tendon reflexes is normal, +2 in the upper and lower extremities bilaterally.   Muscle tone is normal.   Sensory is intact to light touch and pinprick throughout x 4 extremities.     Xray of Lumbar spine.   Lumbar spine demonstrates mild to moderate DJD. Marginal osteophytes seen at multiple levels.   Facet hypertrophy seen at the lower levels. Vertebral body heights are fairly well maintained.   Flexion-extension  views show no evidence of instability     MRI of Lumbar spine showed ( 10/23/14):   L4-5: Circumferential disk bulge, bilateral facet arthropathy, and ligamentum flavum hypertrophy results in moderate bilateral neural foraminal narrowing    and no significant spinal canal narrowing.  L5-S1: Severe bilateral facet arthropathy and ligament flavum hypertrophy resulting in significant spinal canal or neural foraminal narrowing.   Impression:  Multilevel lumbar spondylosis as detailed above resulting in moderate bilateral neural foraminal narrowing at L4-5 and mild bilateral neural foraminal narrowing at L3-4.    Assessment:           1. Diabetic peripheral neuropathy    2. Chronic neck pain    3. Chronic low back pain with sciatica, sciatica laterality unspecified, unspecified back pain laterality    4. Chronic bilateral low back pain without sciatica    5. Carpal tunnel syndrome of right wrist        Plan:       Chronic low back pain with sciatica, sciatica laterality unspecified, unspecified back pain laterality  -     gabapentin (NEURONTIN) 600 MG tablet; Take 1 tablet (600 mg total) by mouth 4 (four) times daily with meals and nightly.  Dispense: 360 tablet; Refill: 1  -     hydrocodone-acetaminophen 10-325mg (NORCO)  mg Tab; Take 1 tablet by mouth every 6 (six) hours as needed for Pain.  Dispense: 120 tablet; Refill: 0  -     hydrocodone-acetaminophen 10-325mg (NORCO)  mg Tab; Take 1 tablet by mouth every 6 (six) hours as needed for Pain.  Dispense: 120 tablet; Refill: 0  -     hydrocodone-acetaminophen 10-325mg (NORCO)  mg Tab; Take 1 tablet by mouth every 6 (six) hours as needed for Pain.  Dispense: 120 tablet; Refill: 0    Diabetic peripheral neuropathy  -     gabapentin (NEURONTIN) 600 MG tablet; Take 1 tablet (600 mg total) by mouth 4 (four) times daily with meals and nightly.  Dispense: 360 tablet; Refill: 1  -     hydrocodone-acetaminophen 10-325mg (NORCO)  mg Tab; Take 1 tablet by  mouth every 6 (six) hours as needed for Pain.  Dispense: 120 tablet; Refill: 0  -     hydrocodone-acetaminophen 10-325mg (NORCO)  mg Tab; Take 1 tablet by mouth every 6 (six) hours as needed for Pain.  Dispense: 120 tablet; Refill: 0  -     hydrocodone-acetaminophen 10-325mg (NORCO)  mg Tab; Take 1 tablet by mouth every 6 (six) hours as needed for Pain.  Dispense: 120 tablet; Refill: 0    Chronic neck pain  -     gabapentin (NEURONTIN) 600 MG tablet; Take 1 tablet (600 mg total) by mouth 4 (four) times daily with meals and nightly.  Dispense: 360 tablet; Refill: 1  -     hydrocodone-acetaminophen 10-325mg (NORCO)  mg Tab; Take 1 tablet by mouth every 6 (six) hours as needed for Pain.  Dispense: 120 tablet; Refill: 0  -     hydrocodone-acetaminophen 10-325mg (NORCO)  mg Tab; Take 1 tablet by mouth every 6 (six) hours as needed for Pain.  Dispense: 120 tablet; Refill: 0  -     hydrocodone-acetaminophen 10-325mg (NORCO)  mg Tab; Take 1 tablet by mouth every 6 (six) hours as needed for Pain.  Dispense: 120 tablet; Refill: 0    Chronic bilateral low back pain without sciatica  -     gabapentin (NEURONTIN) 600 MG tablet; Take 1 tablet (600 mg total) by mouth 4 (four) times daily with meals and nightly.  Dispense: 360 tablet; Refill: 1  -     hydrocodone-acetaminophen 10-325mg (NORCO)  mg Tab; Take 1 tablet by mouth every 6 (six) hours as needed for Pain.  Dispense: 120 tablet; Refill: 0  -     hydrocodone-acetaminophen 10-325mg (NORCO)  mg Tab; Take 1 tablet by mouth every 6 (six) hours as needed for Pain.  Dispense: 120 tablet; Refill: 0  -     hydrocodone-acetaminophen 10-325mg (NORCO)  mg Tab; Take 1 tablet by mouth every 6 (six) hours as needed for Pain.  Dispense: 120 tablet; Refill: 0    Carpal tunnel syndrome of right wrist  -     gabapentin (NEURONTIN) 600 MG tablet; Take 1 tablet (600 mg total) by mouth 4 (four) times daily with meals and nightly.  Dispense: 360  tablet; Refill: 1  -     hydrocodone-acetaminophen 10-325mg (NORCO)  mg Tab; Take 1 tablet by mouth every 6 (six) hours as needed for Pain.  Dispense: 120 tablet; Refill: 0  -     hydrocodone-acetaminophen 10-325mg (NORCO)  mg Tab; Take 1 tablet by mouth every 6 (six) hours as needed for Pain.  Dispense: 120 tablet; Refill: 0  -     hydrocodone-acetaminophen 10-325mg (NORCO)  mg Tab; Take 1 tablet by mouth every 6 (six) hours as needed for Pain.  Dispense: 120 tablet; Refill: 0    DDD (degenerative disc disease), cervical  -     gabapentin (NEURONTIN) 600 MG tablet; Take 1 tablet (600 mg total) by mouth 4 (four) times daily with meals and nightly.  Dispense: 360 tablet; Refill: 1  -     hydrocodone-acetaminophen 10-325mg (NORCO)  mg Tab; Take 1 tablet by mouth every 6 (six) hours as needed for Pain.  Dispense: 120 tablet; Refill: 0  -     hydrocodone-acetaminophen 10-325mg (NORCO)  mg Tab; Take 1 tablet by mouth every 6 (six) hours as needed for Pain.  Dispense: 120 tablet; Refill: 0  -     hydrocodone-acetaminophen 10-325mg (NORCO)  mg Tab; Take 1 tablet by mouth every 6 (six) hours as needed for Pain.  Dispense: 120 tablet; Refill: 0    Spondylitis, cervical  -     gabapentin (NEURONTIN) 600 MG tablet; Take 1 tablet (600 mg total) by mouth 4 (four) times daily with meals and nightly.  Dispense: 360 tablet; Refill: 1  -     hydrocodone-acetaminophen 10-325mg (NORCO)  mg Tab; Take 1 tablet by mouth every 6 (six) hours as needed for Pain.  Dispense: 120 tablet; Refill: 0  -     hydrocodone-acetaminophen 10-325mg (NORCO)  mg Tab; Take 1 tablet by mouth every 6 (six) hours as needed for Pain.  Dispense: 120 tablet; Refill: 0  -     hydrocodone-acetaminophen 10-325mg (NORCO)  mg Tab; Take 1 tablet by mouth every 6 (six) hours as needed for Pain.  Dispense: 120 tablet; Refill: 0    Primary osteoarthritis of right hand  -     gabapentin (NEURONTIN) 600 MG tablet; Take  1 tablet (600 mg total) by mouth 4 (four) times daily with meals and nightly.  Dispense: 360 tablet; Refill: 1  -     hydrocodone-acetaminophen 10-325mg (NORCO)  mg Tab; Take 1 tablet by mouth every 6 (six) hours as needed for Pain.  Dispense: 120 tablet; Refill: 0  -     hydrocodone-acetaminophen 10-325mg (NORCO)  mg Tab; Take 1 tablet by mouth every 6 (six) hours as needed for Pain.  Dispense: 120 tablet; Refill: 0  -     hydrocodone-acetaminophen 10-325mg (NORCO)  mg Tab; Take 1 tablet by mouth every 6 (six) hours as needed for Pain.  Dispense: 120 tablet; Refill: 0    Patient with worsening of chronic back pain, secondary to lumbar spondylosis, facet arthropathy, and  possible lumbar radiculopathy.  Also with Rt hand pain,     1. Discussed the back and left hip/leg pain pain, its course and treatment.   Questions answered.   Discussed appropriate home exercise program.    2. Diagnostics/Imaging:   Xrays of lumbar spine and MRI of lumbar spine results discussed again in details with patient and his wife.    3. Pain management:   Hydrocodone 10/325 mg po q6 hrs prn pain, #120,  2 refills.  Will resume  Neurontin 600 mg po tid- qid if tolerated ( one in am / noon/ at 2 at bedtime).  I will stopped Valium for prn muscle spasms ( since patient was using more for sleeping that for muscle spasm).     4. He completed outpatient PT for back pain.  Offered OT for Rt hand weakness, or inability to close hand.   He was shown some strengthening exercises, to improve fine motor activity in hand.   I recommend him to Ortho hand, but he defers at this time.    Follow up in 3 months    Total time spent face to face with patient was 25 minutes.   More than 50% of that time was spent in counseling on diagnosis , prognosis and treatment options.   I also caunsel patient  on common and most usual side effect of prescribed medications.   Risk and benefits of opiates, possible risk of developing opiate dependence and  tolerance, need of strict compliance with prescribed medications.  I reviewed Primary care , and other specialty's notes to better coordinate patient's  care.   All questions were answered, and patient voiced understanding.

## 2017-04-18 NOTE — MR AVS SNAPSHOT
[facility]  [address]  [facility] [address]  Phone: [phone]  Fax: [phone]                  Vinh Castro   2017 10:00 AM   Office Visit    Description:  Male : 1950   Provider:  Samara Chris MD   Department:  [facility]           Reason for Visit     GI Problem           Diagnoses this Visit        Comments    Pancreatic insufficiency    -  Primary            To Do List           Future Appointments        Provider Department Dept Phone    2017 11:20 AM MD Pb [facility] - Internal Medicine [phone]    2017 1:20 PM MD Pb [facility]-Physical Med & Rehab [phone]    2017 2:00 PM LARRY [facility] - Endocrinology [phone]      Goals (5 Years of Data)     None       These Medications        Disp Refills Start End    polyethylene glycol (GLYCOLAX) 17 gram/dose powder 1 Bottle 3 2017     Take 17 g by mouth once daily. - Oral    Pharmacy: [id number] - [place], [place] - [address] AT [address] [place] [facility] Ph #: [phone]         [facility] On [facility]     [facility] Nurse Care Line -  Assistance  Unless otherwise directed by your provider, please contact [facility], our nurse care line that is available for  assistance.     Registered nurses in the [facility] provide: appointment scheduling, clinical advisement, health education, and other advisory services.  Call: [phone] (toll free)               Medications           Message regarding Medications     Verify the changes and/or additions to your medication regime listed below are the same as discussed with your clinician today.  If any of these changes or additions are incorrect, please notify your healthcare provider.        START taking these NEW medications        Refills    polyethylene glycol (GLYCOLAX) 17 gram/dose powder 3    Sig: Take 17 g by mouth once  "daily.    Class: Normal    Route: Oral           Verify that the below list of medications is an accurate representation of the medications you are currently taking.  If none reported, the list may be blank. If incorrect, please contact your healthcare provider. Carry this list with you in case of emergency.           Current Medications     alprazolam (XANAX) 0.5 MG tablet Take 1 tablet (0.5 mg total) by mouth daily as needed.    aspirin 81 MG Chew Take 81 mg by mouth once daily.    blood sugar diagnostic (ACCU-CHEK JANEY PLUS TEST STRP) Strp Patient to check blood sugar 6 x's per day--Dx Code 250.62,  357.2    calcium carbonate-simethicone (MAALOX ADVANCED) 1,000-60 mg Chew Take 2 tablets by mouth 3 (three) times daily as needed.    cholecalciferol, vitamin D3, (VITAMIN D3) 5,000 unit Tab Take 5,000 Units by mouth. Takes one tablet every day except on Sun and Wed    ciprofloxacin HCl (CILOXAN) 0.3 % ophthalmic solution     CREON 24,000-76,000 -120,000 unit capsule TAKE 2 CAPSULES BY MOUTH THREE TIMES DAILY WITH MEALS AND 1 CAPSULE WITH A SNACK    DEXTROSE (GLUCOSE) Chew Take by mouth as needed.    ELIQUIS 5 mg Tab TAKE 1 TABLET BY MOUTH TWICE DAILY    ferrous sulfate 325 mg (65 mg iron) Tab tablet TAKE 1 TABLET BY MOUTH EVERY DAY    fexofenadine (ALLEGRA) 180 MG tablet Take 180 mg by mouth once daily.    fluocinolone-shower cap 0.01 % Oil     fluticasone (FLONASE) 50 mcg/actuation nasal spray SHAKE WELL AND USE 2 SPRAYS IN EACH NOSTRIL EVERY DAY    furosemide (LASIX) 80 MG tablet Take 80 mg by mouth once daily.    guaifenesin (MUCINEX) 600 mg 12 hr tablet Take 600 mg by mouth continuous prn for Congestion. Prn      HUMULIN R U-500, CONCENTRATED, 500 unit/mL Soln INJECT INTO SKIN 32 UNIT JORDY ON REGULAR U-100 SYRINGE BEFORE MEALS AND WITH EVENING SNACK    insulin needles, disposable, (BD ULTRA-FINE REJI PEN NEEDLES) 32 x 5/32 " Ndle Uses 5 daily, on multiple daily insulin injections    insulin syringe-needle U-100 " 1/2 mL 30 Syrg     insulin syringe-needle U-100 1/2 mL 30 Syrg USE WITH INSULIN THREE TIMES DAILY TO FOUR TIMES DAILY    isosorbide mononitrate (IMDUR) 30 MG 24 hr tablet Take 1 tablet (30 mg total) by mouth every evening.    KRILL/OM3/DHA/EPA/OM6/LIP/ASTX (KRILL OIL, OMEGA 3 & 6, ORAL) Take 1 tablet by mouth every evening.     lancets (BD ULTRA FINE LANCETS) Misc 1 Units by Misc.(Non-Drug; Combo Route) route 4 (four) times daily.    levothyroxine (SYNTHROID) 25 MCG tablet TAKE 1 TABLET(25 MCG) BY MOUTH EVERY DAY    losartan (COZAAR) 50 MG tablet TAKE 1 TABLET BY MOUTH ONCE DAILY    metolazone (ZAROXOLYN) 5 MG tablet Take 1 tablet (5 mg total) by mouth daily as needed (Weight gain of 5 pounds or more).    metoprolol tartrate (LOPRESSOR) 50 MG tablet Take 1 tablet (50 mg total) by mouth 2 (two) times daily.    moxifloxacin (AVELOX) 400 mg tablet TK 1 T PO QD FOR 8 DAYS    MV-MN/FA/LYCOPENE/LUT/HB#178 (RAMEZ MULTIVITAMIN FOR MEN ORAL) Take by mouth.    NEVANAC 0.1 % ophthalmic suspension     nitroGLYCERIN (NITROSTAT) 0.4 MG SL tablet Place 1 tablet (0.4 mg total) under the tongue every 5 (five) minutes as needed.    ofloxacin (FLOXIN) 0.3 % otic solution Place 3 drops into both ears 2 (two) times daily.     ondansetron (ZOFRAN) 4 MG tablet Take 1 tablet (4 mg total) by mouth every 8 (eight) hours as needed for Nausea.    oxymetazoline (MUCINEX SINUS-MAX) 0.05 % nasal spray 2 sprays by Nasal route once daily.    pantoprazole (PROTONIX) 40 MG tablet TAKE 1 TABLET(40 MG) BY MOUTH TWICE DAILY    PATADAY 0.2 % Drop Place 1 drop into both eyes once daily.     tamsulosin (FLOMAX) 0.4 mg Cp24 TAKE 1 CAPSULE BY MOUTH EVERY DAY    triamcinolone (NASACORT) 55 mcg nasal inhaler 2 sprays by Nasal route once daily.    triamcinolone acetonide 0.1% (KENALOG) 0.1 % cream     VITAMIN D2 50,000 unit capsule TAKE 1 CAPSULE BY MOUTH 2 TIMES A WEEK    zolpidem (AMBIEN) 5 MG Tab TAKE 1 TABLET BY MOUTH EVERY EVENING    diclofenac sodium  "(VOLTAREN) 1 % Gel Apply 2 g topically 4 (four) times daily. Apply to both hand    gabapentin (NEURONTIN) 600 MG tablet Take 1 tablet (600 mg total) by mouth 4 (four) times daily with meals and nightly.    hydrocodone-acetaminophen 10-325mg (NORCO)  mg Tab Take 1 tablet by mouth 4 (four) times daily.    polyethylene glycol (GLYCOLAX) 17 gram/dose powder Take 17 g by mouth once daily.           Clinical Reference Information           Your Vitals Were     BP Pulse Height Weight BMI    157/65 68 5' 8" (1.727 m) 115.7 kg (255 lb 1.2 oz) 38.78 kg/m2      Blood Pressure          Most Recent Value    BP  (!)  157/65      Allergies as of 4/18/2017     Cephalexin    Penicillins    Pravastatin    Sulfa (Sulfonamide Antibiotics)    Adhesive      Immunizations Administered on Date of Encounter - 4/18/2017     None      Language Assistance Services     ATTENTION: Language assistance services are available, free of charge. Please call [phone].      ATENCIÓN: Si habla sumanth, tiene a wilkerson disposición servicios gratuitos de asistencia lingüística. Llame al [phone].     BAL Ý: N?u b?n nói Ti?ng Vi?t, có các d?ch v? h? tr? ngôn ng? mi?n phí dành cho b?n. G?i s? [phone].         [facility] complies with applicable Federal civil rights laws and does not discriminate on the basis of race, color, national origin, age, disability, or sex.        "

## 2017-04-18 NOTE — PROGRESS NOTES
Subjective:       Patient ID: Vinh Castro is a 66 y.o. White male who presents for new evaluation of Chronic Kidney Disease    HPI     Mr. Castro was seen in nephrology clinic in new patient evaluation for CKD. He was accompanied by his wife. Pt had prior nephrology clinic visits in 2013 and then 2015 with Dr. Buckley. Since then, however, he was lost for follow up.    Prior pertinent chart was reviewed. Pt has longstanding and very poorly controlled diabetes, CKD, hypertension, proteinuria, CAD, A fib, pancreatic insufficiency, SEAN, PVD, obesity, cirrhosis presumed to be due to PARRISH and other medical problems. He denies NSAID use. He has diabetic neuropathy and retinopathy.    Pt has been on losartan based regimen. He has had prior hyperkalemia. Of note he has been on K supplement. He also reports taking vit D OTC daily and another preparation every 2 weeks.     He denies any recent nausea/ vomiting/ diarrhea/ flank pain/ leg swelling. He denies any urinary symptoms.     Prior serial labs noted for creatinine of 1.6 since 2012. His most recent baseline has been around 1.7 to 1.8. He has chronic anemia. He has proteinuria of around 2 grams from 2013.     Review of Systems   Constitutional: Negative for appetite change, chills, fatigue and fever.   HENT: Negative for hearing loss, sneezing and sore throat.    Eyes: Negative for pain and discharge.   Respiratory: Negative for cough, shortness of breath and wheezing.    Cardiovascular: Negative for chest pain and leg swelling.   Gastrointestinal: Negative for abdominal pain, diarrhea, nausea and vomiting.   Endocrine: Negative for polydipsia and polyuria.   Genitourinary: Negative for dysuria, flank pain, frequency and hematuria.   Musculoskeletal: Negative for back pain and myalgias.   Skin: Negative for pallor and rash.   Allergic/Immunologic: Negative for environmental allergies.   Neurological: Negative for dizziness, light-headedness and headaches.    Psychiatric/Behavioral: Negative for behavioral problems. The patient is not nervous/anxious.        Objective:      Physical Exam   Constitutional: He is oriented to person, place, and time. He appears well-developed. No distress.   Abdominal obesity    HENT:   Head: Normocephalic and atraumatic.   Nose: Nose normal.   Mouth/Throat: Oropharynx is clear and moist.   Eyes: Right eye exhibits no discharge. Left eye exhibits no discharge. No scleral icterus.   Neck: Normal range of motion. Neck supple.   Large thick neck   Cardiovascular: Normal rate, regular rhythm and normal heart sounds.    Pulmonary/Chest: Effort normal and breath sounds normal. No respiratory distress. He has no wheezes. He has no rales.   Abdominal: Soft.   Morbid obesity, cannot assess for organomegaly    Musculoskeletal: He exhibits no edema.   Neurological: He is alert and oriented to person, place, and time.   Speech and cognition normal   Skin: Skin is warm and dry. He is not diaphoretic.   Psychiatric: He has a normal mood and affect. His behavior is normal.   Vitals reviewed.      Assessment:       1. CKD (chronic kidney disease), stage III    2. Diabetic nephropathy associated with type 2 diabetes mellitus    3. Hypertensive CKD (chronic kidney disease)    4. Proteinuria, unspecified type        Plan:     Mr. Castro has longstanding and poorly controlled diabetes with A1C above 10 for a long time, end organ damage due to diabetes causing neuropathy, retinopathy, hypertension, morbid obesity, atherosclerotic vascular disease and multiple other medical problems. He has CKD III with proteinuria which is most likely due to uncontrolled diabetes for a long time along with hypertension and morbid obesity. He has had prior hyperkalemia. I have advised him to stop K supplements. I discussed with pt and his wife in detail about his CKD diagnosis, onset, serial labs, CKD staging, low salt diet, hydration with water, avoiding K supplements  etc.    Plan  - periodically monitor renal panel for electrolytes, acid base status, eGFR  - potential risk of CKD progression d/w patient, especially in light of poorly controlled diabetes  - continue losartan based regimen  - monitor BP at home, his goal should be less than 130/80  - strict low salt diet  - obtain PTH levels, vit D, he can stop taking 2 different preparations of vit D  - follow urine studies for proteinuria  - labs to rule out paraprotein  - obtain US kidney for kidney size, rule out mass/ cyst  - avoid NSAID/ bactrim/ IV contrast/ gadolinium/ aminoglycoside where possible  - stop K supplement for now given episodes of hyperkalemia, K supplement to be used prn if he develops hypokalemia in the future     Plan, labs, recommendations were discussed with patient and his wife, their questions were answered to their satisfaction.   RTC 3 months

## 2017-04-18 NOTE — LETTER
April 18, 2017      Shimon Salazar MD  [address] [facility] [address]           [facility]  [address]  [place] [address]  Phone: [phone]  Fax: [phone]          Patient: Vinh Castro   MR Number: [id number]   YOB: 1950   Date of Visit: 4/18/2017       Dear Dr. Shimon Salazar:    Thank you for referring Vinh Castro to me for evaluation. Attached you will find relevant portions of my assessment and plan of care.    If you have questions, please do not hesitate to call me. I look forward to following Vinh Castro along with you.    Sincerely,    Jo Zuñiga MD    Enclosure  CC:  No Recipients    If you would like to receive this communication electronically, please contact [email or web address] or [phone] to request more information on [employer] Link access.    For providers and/or their staff who would like to refer a patient to [facility], please contact us through our one-stop-shop provider referral line, [facility], at [phone].    If you feel you have received this communication in error or would no longer like to receive these types of communications, please e-mail [email or web address]

## 2017-04-19 ENCOUNTER — PATIENT MESSAGE (OUTPATIENT)
Dept: INTERNAL MEDICINE | Facility: CLINIC | Age: 67
End: 2017-04-19

## 2017-04-24 ENCOUNTER — PATIENT MESSAGE (OUTPATIENT)
Dept: DIABETES | Facility: CLINIC | Age: 67
End: 2017-04-24

## 2017-04-24 ENCOUNTER — CLINICAL SUPPORT (OUTPATIENT)
Dept: ELECTROPHYSIOLOGY | Facility: CLINIC | Age: 67
End: 2017-04-24
Payer: MEDICARE

## 2017-04-24 DIAGNOSIS — Z95.818 STATUS POST PLACEMENT OF IMPLANTABLE LOOP RECORDER: ICD-10-CM

## 2017-04-24 DIAGNOSIS — R55 SYNCOPE: ICD-10-CM

## 2017-04-24 PROCEDURE — 93297 REM INTERROG DEV EVAL ICPMS: CPT | Mod: ,,, | Performed by: INTERNAL MEDICINE

## 2017-04-24 PROCEDURE — 93299 LOOP RECORDER REMOTE: CPT | Mod: PBBFAC | Performed by: INTERNAL MEDICINE

## 2017-04-26 ENCOUNTER — TELEPHONE (OUTPATIENT)
Dept: INTERNAL MEDICINE | Facility: CLINIC | Age: 67
End: 2017-04-26

## 2017-04-26 DIAGNOSIS — M54.10 RADICULAR LOW BACK PAIN: Primary | ICD-10-CM

## 2017-04-26 DIAGNOSIS — M15.9 PRIMARY OSTEOARTHRITIS INVOLVING MULTIPLE JOINTS: ICD-10-CM

## 2017-04-26 NOTE — TELEPHONE ENCOUNTER
----- Message from Marycarmen Lara sent at 4/26/2017  4:04 PM CDT -----  Contact: self/[phone]  Type: Referral to a Specialist    Where would you like a referral to? Physical Therapy    Have you previously requested?    Is the physician within the [facility]? No    Name and phone number of specialist: Dr. Eugenio Wakefield  # [phone]    Reason for appointment:     Is an appointment scheduled with specialist? When? Today    Comments: Patient saw the doctor today, and was told that because of his insurance he needs a referral from his PCP. Please call and advise.        Thank you.

## 2017-04-28 DIAGNOSIS — I10 HTN (HYPERTENSION): ICD-10-CM

## 2017-05-01 RX ORDER — ONDANSETRON 4 MG/1
TABLET, FILM COATED ORAL
Qty: 30 TABLET | Refills: 0 | Status: SHIPPED | OUTPATIENT
Start: 2017-05-01 | End: 2017-05-31 | Stop reason: SDUPTHER

## 2017-05-01 RX ORDER — LOSARTAN POTASSIUM 50 MG/1
TABLET ORAL
Qty: 90 TABLET | Refills: 3 | Status: SHIPPED | OUTPATIENT
Start: 2017-05-01 | End: 2018-04-12 | Stop reason: SDUPTHER

## 2017-05-03 RX ORDER — ISOSORBIDE MONONITRATE 30 MG/1
TABLET, EXTENDED RELEASE ORAL
Qty: 30 TABLET | Refills: 5 | Status: SHIPPED | OUTPATIENT
Start: 2017-05-03 | End: 2018-02-15

## 2017-05-25 ENCOUNTER — CLINICAL SUPPORT (OUTPATIENT)
Dept: ELECTROPHYSIOLOGY | Facility: CLINIC | Age: 67
End: 2017-05-25
Payer: MEDICARE

## 2017-05-25 DIAGNOSIS — Z95.818 STATUS POST PLACEMENT OF IMPLANTABLE LOOP RECORDER: ICD-10-CM

## 2017-05-25 DIAGNOSIS — R55 SYNCOPE: ICD-10-CM

## 2017-05-25 PROCEDURE — 93297 REM INTERROG DEV EVAL ICPMS: CPT | Mod: ,,, | Performed by: INTERNAL MEDICINE

## 2017-06-01 RX ORDER — ONDANSETRON 4 MG/1
TABLET, FILM COATED ORAL
Qty: 30 TABLET | Refills: 0 | Status: SHIPPED | OUTPATIENT
Start: 2017-06-01 | End: 2017-07-03 | Stop reason: SDUPTHER

## 2017-06-02 DIAGNOSIS — I50.30 HEART FAILURE WITH PRESERVED LEFT VENTRICULAR FUNCTION (HFPEF): ICD-10-CM

## 2017-06-02 RX ORDER — FUROSEMIDE 80 MG/1
TABLET ORAL
Qty: 30 TABLET | Refills: 0 | Status: SHIPPED | OUTPATIENT
Start: 2017-06-02 | End: 2017-07-27 | Stop reason: SDUPTHER

## 2017-06-05 ENCOUNTER — TELEPHONE (OUTPATIENT)
Dept: INTERNAL MEDICINE | Facility: CLINIC | Age: 67
End: 2017-06-05

## 2017-06-05 NOTE — TELEPHONE ENCOUNTER
If he is having stomach and chest pains, I recommend he come in for an urgent visit.  Sounds like he has refused that, but please let him know that I strongly recommend he come in to see someone and get this all sorted out.

## 2017-06-05 NOTE — TELEPHONE ENCOUNTER
----- Message from Ava Collins sent at 6/5/2017 12:18 PM CDT -----  Contact: Home: [phone]   Radha patient want to speak with you about a lot of  stomach problems. He will tell you the rest when you call him.

## 2017-06-05 NOTE — TELEPHONE ENCOUNTER
Spoke to patient and states that he is having stomach issues, sickness to the stomach, more than usual. Pain in the center of chest, like a tightness, and takes mucinex and seems to help somewhat-----he also states that he messed up with the Lasix and took 40 mg instead of 80 mg and not sure if he has fluid-----patient wants to see Dr. Thapa only----pls advise

## 2017-06-06 ENCOUNTER — OFFICE VISIT (OUTPATIENT)
Dept: INTERNAL MEDICINE | Facility: CLINIC | Age: 67
End: 2017-06-06
Payer: MEDICARE

## 2017-06-06 ENCOUNTER — HOSPITAL ENCOUNTER (OUTPATIENT)
Dept: RADIOLOGY | Facility: HOSPITAL | Age: 67
Discharge: HOME | End: 2017-06-06
Attending: INTERNAL MEDICINE
Payer: MEDICARE

## 2017-06-06 VITALS
WEIGHT: 247.56 LBS | DIASTOLIC BLOOD PRESSURE: 54 MMHG | RESPIRATION RATE: 14 BRPM | HEIGHT: 68 IN | SYSTOLIC BLOOD PRESSURE: 128 MMHG | OXYGEN SATURATION: 98 % | BODY MASS INDEX: 37.52 KG/M2 | HEART RATE: 57 BPM

## 2017-06-06 DIAGNOSIS — R07.9 CHEST PAIN, UNSPECIFIED: ICD-10-CM

## 2017-06-06 DIAGNOSIS — F32.A DEPRESSION, UNSPECIFIED DEPRESSION TYPE: ICD-10-CM

## 2017-06-06 DIAGNOSIS — I50.30 HEART FAILURE WITH PRESERVED LEFT VENTRICULAR FUNCTION (HFPEF): ICD-10-CM

## 2017-06-06 DIAGNOSIS — R07.9 CHEST PAIN, UNSPECIFIED: Primary | ICD-10-CM

## 2017-06-06 DIAGNOSIS — I25.10 CORONARY ARTERY DISEASE INVOLVING NATIVE CORONARY ARTERY OF NATIVE HEART WITHOUT ANGINA PECTORIS: ICD-10-CM

## 2017-06-06 DIAGNOSIS — K21.9 GASTROESOPHAGEAL REFLUX DISEASE, ESOPHAGITIS PRESENCE NOT SPECIFIED: ICD-10-CM

## 2017-06-06 PROCEDURE — 71020 XR CHEST PA AND LATERAL: CPT | Mod: 26,,, | Performed by: RADIOLOGY

## 2017-06-06 PROCEDURE — 99999 PR PBB SHADOW E&M-EST. PATIENT-LVL III: CPT | Mod: PBBFAC,,, | Performed by: INTERNAL MEDICINE

## 2017-06-06 PROCEDURE — 71020 XR CHEST PA AND LATERAL: CPT | Mod: TC

## 2017-06-06 RX ORDER — SUCRALFATE 1 G/10ML
1 SUSPENSION ORAL 4 TIMES DAILY
Qty: 1200 ML | Refills: 5 | Status: SHIPPED | OUTPATIENT
Start: 2017-06-06 | End: 2017-07-06

## 2017-06-06 NOTE — PROGRESS NOTES
Subjective:       Patient ID: Vinh Castro is a 66 y.o. male.    Chief Complaint: Chest Pain; Abdominal Pain; Depression; and Gastroesophageal Reflux    Vinh Castro is a 66 y.o. male  With long cardiac history here with long term chest pains .  Reports for more than 2 years; reports pains are getting worse;   He has been getting less lasix and feels that his breathing is getting worse.  Reports ulcer like pain in epigastrium ; Protonix is not helping . Continue to have heart burn ;indigestion .      C/o depression feeling 6-12 weeks.  C/o nervousness ; low mood, not sleeping well  ++day time sleepiness; he is taking zolpidem; and norco             Chest Pain    Associated symptoms include abdominal pain and shortness of breath. Pertinent negatives include no back pain, dizziness, fever, numbness or vomiting.   Pertinent negatives for past medical history include no seizures.   Abdominal Pain   Pertinent negatives include no dysuria, fever, hematuria or vomiting. His past medical history is significant for GERD.   Gastroesophageal Reflux   He complains of abdominal pain and chest pain. He reports no sore throat.     Review of Systems   Constitutional: Negative for chills and fever.   HENT: Negative for congestion, postnasal drip and sore throat.    Eyes: Negative for photophobia.   Respiratory: Positive for shortness of breath. Negative for chest tightness.    Cardiovascular: Positive for chest pain.   Gastrointestinal: Positive for abdominal pain. Negative for abdominal distention, blood in stool and vomiting.   Genitourinary: Negative for dysuria, flank pain and hematuria.   Musculoskeletal: Negative for back pain.   Skin: Negative for pallor.   Neurological: Negative for dizziness, seizures, facial asymmetry, speech difficulty and numbness.   Hematological: Does not bruise/bleed easily.   Psychiatric/Behavioral: Negative for agitation and suicidal ideas. The patient is not nervous/anxious.        Objective:       Physical Exam   Constitutional: He is oriented to person, place, and time. He appears well-developed and well-nourished.   HENT:   Head: Normocephalic and atraumatic.   Neck: No JVD present.   Cardiovascular: Normal rate, regular rhythm and normal heart sounds.    Pulmonary/Chest: Effort normal and breath sounds normal.   Abdominal: Soft. Bowel sounds are normal. There is no tenderness.   Musculoskeletal: He exhibits no edema.   ++ edema.     Neurological: He is alert and oriented to person, place, and time.   Skin: Skin is warm and dry.   Psychiatric: He has a normal mood and affect. His behavior is normal. Judgment and thought content normal.   Nursing note and vitals reviewed.      Assessment:       1. Chest pain, unspecified    2. Coronary artery disease involving native coronary artery of native heart without angina pectoris    3. Heart failure with preserved left ventricular function (HFpEF)    4. Gastroesophageal reflux disease, esophagitis presence not specified    5. Depression, unspecified depression type        Plan:   Vinh was seen today for chest pain, abdominal pain, depression and gastroesophageal reflux.    Diagnoses and all orders for this visit:    Chest pain, unspecified  -     CBC auto differential; Future  -     Comprehensive metabolic panel; Future  -     TSH; Future  -     Brain natriuretic peptide; Future  -     POCT EKG 12-LEAD (NOT FOR [facility] USE)  -     X-Ray Chest PA And Lateral; Future  Most likely GI related     Coronary artery disease involving native coronary artery of native heart without angina pectoris  -     CBC auto differential; Future  -     Comprehensive metabolic panel; Future  -     TSH; Future  -     Brain natriuretic peptide; Future  -     X-Ray Chest PA And Lateral; Future  Continue ASA; eliquis   EKG : LOOKS UNCHANGED      IF CP will come back GO TO ER     Heart failure with preserved left ventricular function (HFpEF)  -     CBC auto differential; Future  -      Comprehensive metabolic panel; Future  -     TSH; Future  -     Brain natriuretic peptide; Future  -     X-Ray Chest PA And Lateral; Future  Resume lasix 80 mg daily   Continue metalozone  We discussed about leg edema  And  weight gain issues with CHF.  I educated patient to call  If he  Gains 5 lbs and more ,or legs start swelling or worsening shortness of breath , or inability to lie down.  If breathing is worse go to emergency Room    Gastroesophageal reflux disease, esophagitis presence not specified  -     sucralfate (CARAFATE) 100 mg/mL suspension; Take 10 mLs (1 g total) by mouth 4 (four) times daily.  Continue with protonix      Depression, unspecified depression type  Continue present regimen

## 2017-06-07 ENCOUNTER — TELEPHONE (OUTPATIENT)
Dept: INTERNAL MEDICINE | Facility: CLINIC | Age: 67
End: 2017-06-07

## 2017-06-07 NOTE — TELEPHONE ENCOUNTER
I told him he is on ambien; alprazolam; pain meds; and he c/o sleepiness . he should be on less mind altering meds:  his list of meds has close to 20 meds ; I certainly will not recommend more meds.

## 2017-06-07 NOTE — TELEPHONE ENCOUNTER
When i called pt with his lab results. He wanted me to let you know that he is suffering with anxiety/depression. He knows you don't want him taking the xanax daily, so he wants to know if you can put him on anything else that can keep him controlled.

## 2017-06-13 ENCOUNTER — OFFICE VISIT (OUTPATIENT)
Dept: INTERNAL MEDICINE | Facility: CLINIC | Age: 67
End: 2017-06-13
Payer: MEDICARE

## 2017-06-13 VITALS
WEIGHT: 249.13 LBS | RESPIRATION RATE: 16 BRPM | HEART RATE: 68 BPM | OXYGEN SATURATION: 94 % | BODY MASS INDEX: 37.76 KG/M2 | DIASTOLIC BLOOD PRESSURE: 60 MMHG | SYSTOLIC BLOOD PRESSURE: 142 MMHG | HEIGHT: 68 IN

## 2017-06-13 DIAGNOSIS — K21.9 GASTROESOPHAGEAL REFLUX DISEASE, ESOPHAGITIS PRESENCE NOT SPECIFIED: Primary | ICD-10-CM

## 2017-06-13 DIAGNOSIS — D53.9 MACROCYTIC ANEMIA: ICD-10-CM

## 2017-06-13 DIAGNOSIS — F32.A DEPRESSION, UNSPECIFIED DEPRESSION TYPE: ICD-10-CM

## 2017-06-13 DIAGNOSIS — E87.5 HYPERKALEMIA: ICD-10-CM

## 2017-06-13 PROCEDURE — 99999 PR PBB SHADOW E&M-EST. PATIENT-LVL III: CPT | Mod: PBBFAC,,, | Performed by: INTERNAL MEDICINE

## 2017-06-13 PROCEDURE — 99213 OFFICE O/P EST LOW 20 MIN: CPT | Mod: S$PBB | Performed by: INTERNAL MEDICINE

## 2017-06-13 PROCEDURE — 99213 OFFICE O/P EST LOW 20 MIN: CPT | Mod: PBBFAC | Performed by: INTERNAL MEDICINE

## 2017-06-13 PROCEDURE — 1159F MED LIST DOCD IN RCRD: CPT | Performed by: INTERNAL MEDICINE

## 2017-06-13 NOTE — PROGRESS NOTES
Subjective:       Patient ID: Vinh Castro is a 66 y.o. male.    Chief Complaint: Abdominal Pain (1 week follow up); Depression; and Gastroesophageal Reflux    Vinh Castro is a 66 y.o. Male  Here for follow up     With long cardiac history here with long term chest pains .    Reports ulcer like pain in epigastrium  Is better with carafate also taking Protonix .      C/o depression feeling 6-12 weeks.  C/o nervousness ; low mood, not sleeping well  ++day time sleepiness; he is taking zolpidem; and norco             Abdominal Pain   Pertinent negatives include no dysuria, fever, hematuria or vomiting. His past medical history is significant for GERD.   Gastroesophageal Reflux   He complains of abdominal pain. He reports no sore throat.   Chest Pain    Associated symptoms include abdominal pain and shortness of breath. Pertinent negatives include no back pain, dizziness, fever, numbness or vomiting.   Pertinent negatives for past medical history include no seizures.     Review of Systems   Constitutional: Negative for chills and fever.   HENT: Negative for congestion, postnasal drip and sore throat.    Eyes: Negative for photophobia.   Respiratory: Positive for shortness of breath. Negative for chest tightness.    Cardiovascular:        Heart burn substernal     Gastrointestinal: Positive for abdominal pain. Negative for abdominal distention, blood in stool and vomiting.   Genitourinary: Negative for dysuria, flank pain and hematuria.   Musculoskeletal: Negative for back pain.   Skin: Negative for pallor.   Neurological: Negative for dizziness, seizures, facial asymmetry, speech difficulty and numbness.   Hematological: Does not bruise/bleed easily.   Psychiatric/Behavioral: Negative for agitation and suicidal ideas. The patient is not nervous/anxious.        Objective:      Physical Exam   Constitutional: He is oriented to person, place, and time. He appears well-developed and well-nourished.   HENT:   Head:  Normocephalic and atraumatic.   Neck: No JVD present.   Cardiovascular: Normal rate, regular rhythm and normal heart sounds.    Pulmonary/Chest: Effort normal and breath sounds normal.   Abdominal: Soft. Bowel sounds are normal. There is no tenderness.   Musculoskeletal: He exhibits no edema.   ++ edema.     Neurological: He is alert and oriented to person, place, and time.   Skin: Skin is warm and dry.   Psychiatric: He has a normal mood and affect. His behavior is normal. Judgment and thought content normal.   Nursing note and vitals reviewed.      Assessment:       1. Gastroesophageal reflux disease, esophagitis presence not specified    2. Hyperkalemia    3. Depression, unspecified depression type    4. Macrocytic anemia        Plan:   Vnih was seen today for abdominal pain, depression and gastroesophageal reflux.    Diagnoses and all orders for this visit:    Gastroesophageal reflux disease, esophagitis presence not specified  Much better with carafate  Continue protonix    Hyperkalemia  -     Basic metabolic panel; Future    HYPERKALEMIA   Hyperkalemia is a condition caused by too much potassium in the blood. Most often this occurs in persons taking potassium supplements, or those with severe kidney disease.   Mild hyperkalemia usually causes no symptoms. It is only discovered with a blood test. As the potassium level rises, symptoms may include weakness, heart palpitations (rapid or irregular heartbeats), nausea, vomiting, or diarrhea.   HOME CARE:   1. Follow your doctors advice about any potassium supplements and diuretics (water pills) you may be taking.   2. Additional prescription medicines may also be given to remove excess potassium.  FOLLOW UP with your doctor for a repeat blood test within the next 7 days, unless told otherwise.   GET PROMPT MEDICAL ATTENTION if any of the following occur:   Weakness, dizziness   Irregular heartbeat, extra beats, very fast or very slow heart rate   Fainting spell    Nausea, vomiting, or diarrhea   Chest, arm, shoulder, neck or upper back pain, or shortness of breath   Reduced urination    Depression, unspecified depression type  Continue same meds.      Macrocytic anemia  -     Methylmalonic acid, serum; Future  -     Vitamin B12; Future    We discussed about  b12 defficiency

## 2017-06-20 ENCOUNTER — LAB VISIT (OUTPATIENT)
Dept: LAB | Facility: HOSPITAL | Age: 67
End: 2017-06-20
Attending: INTERNAL MEDICINE
Payer: MEDICARE

## 2017-06-20 DIAGNOSIS — E87.5 HYPERKALEMIA: ICD-10-CM

## 2017-06-20 DIAGNOSIS — D53.9 MACROCYTIC ANEMIA: ICD-10-CM

## 2017-06-20 LAB
ANION GAP SERPL CALC-SCNC: 10 MMOL/L
BUN SERPL-MCNC: 32 MG/DL
CALCIUM SERPL-MCNC: 9.4 MG/DL
CHLORIDE SERPL-SCNC: 102 MMOL/L
CO2 SERPL-SCNC: 27 MMOL/L
CREAT SERPL-MCNC: 1.6 MG/DL
EST. GFR  (AFRICAN AMERICAN): 51 ML/MIN/1.73 M^2
EST. GFR  (NON AFRICAN AMERICAN): 44 ML/MIN/1.73 M^2
GLUCOSE SERPL-MCNC: 189 MG/DL
POTASSIUM SERPL-SCNC: 4.5 MMOL/L
SODIUM SERPL-SCNC: 139 MMOL/L
VIT B12 SERPL-MCNC: 820 PG/ML

## 2017-06-20 PROCEDURE — 83921 ORGANIC ACID SINGLE QUANT: CPT

## 2017-06-20 PROCEDURE — 36415 COLL VENOUS BLD VENIPUNCTURE: CPT

## 2017-06-20 PROCEDURE — 80048 BASIC METABOLIC PNL TOTAL CA: CPT

## 2017-06-20 PROCEDURE — 82607 VITAMIN B-12: CPT

## 2017-06-23 ENCOUNTER — OFFICE VISIT (OUTPATIENT)
Dept: INTERNAL MEDICINE | Facility: CLINIC | Age: 67
End: 2017-06-23
Payer: MEDICARE

## 2017-06-23 VITALS
DIASTOLIC BLOOD PRESSURE: 60 MMHG | RESPIRATION RATE: 16 BRPM | OXYGEN SATURATION: 93 % | HEIGHT: 68 IN | SYSTOLIC BLOOD PRESSURE: 132 MMHG | BODY MASS INDEX: 38.22 KG/M2 | HEART RATE: 68 BPM | WEIGHT: 252.19 LBS

## 2017-06-23 DIAGNOSIS — K21.9 GASTROESOPHAGEAL REFLUX DISEASE, ESOPHAGITIS PRESENCE NOT SPECIFIED: Primary | ICD-10-CM

## 2017-06-23 LAB — METHYLMALONATE SERPL-SCNC: 0.32 UMOL/L

## 2017-06-23 PROCEDURE — 1159F MED LIST DOCD IN RCRD: CPT | Performed by: INTERNAL MEDICINE

## 2017-06-23 PROCEDURE — 99213 OFFICE O/P EST LOW 20 MIN: CPT | Mod: PBBFAC | Performed by: INTERNAL MEDICINE

## 2017-06-23 PROCEDURE — 99213 OFFICE O/P EST LOW 20 MIN: CPT | Mod: S$PBB | Performed by: INTERNAL MEDICINE

## 2017-06-23 PROCEDURE — 99999 PR PBB SHADOW E&M-EST. PATIENT-LVL III: CPT | Mod: PBBFAC,,, | Performed by: INTERNAL MEDICINE

## 2017-06-23 PROCEDURE — 3066F NEPHROPATHY DOC TX: CPT | Performed by: INTERNAL MEDICINE

## 2017-06-23 PROCEDURE — 3046F HEMOGLOBIN A1C LEVEL >9.0%: CPT | Performed by: INTERNAL MEDICINE

## 2017-06-23 NOTE — PROGRESS NOTES
Subjective:       Patient ID: Vinh Castro is a 66 y.o. male.    Chief Complaint: Gastroesophageal Reflux (1 WEEK FOLLOW UP WITH LAB REVIEW) and Diarrhea    Vinh Castro is a 66 y.o. male  Here for follow up for GERD.  Started him on Carafate ;feels better .  reported one episode of loose diarrhea.  No belly t pain ; no nausea and vomiting       BMP  Lab Results       Component                Value               Date                       NA                       139                 06/20/2017                 K                        4.5                 06/20/2017                 CL                       102                 06/20/2017                 CO2                      27                  06/20/2017                 BUN                      32 (H)              06/20/2017                 CREATININE               1.6 (H)             06/20/2017                 CALCIUM                  9.4                 06/20/2017                 ANIONGAP                 10                  06/20/2017                 ESTGFRAFRICA             51 (A)              06/20/2017                 EGFRNONAA                44 (A)              06/20/2017                    Gastroesophageal Reflux   He complains of abdominal pain and chest pain. He reports no sore throat.   Diarrhea    Associated symptoms include abdominal pain. Pertinent negatives include no chills, fever or vomiting.     Review of Systems   Constitutional: Negative for chills and fever.   HENT: Negative for congestion, postnasal drip and sore throat.    Eyes: Negative for photophobia.   Respiratory: Positive for shortness of breath. Negative for chest tightness.    Cardiovascular: Positive for chest pain.        Heart burn substernal     Gastrointestinal: Positive for abdominal pain and diarrhea. Negative for abdominal distention, blood in stool and vomiting.   Genitourinary: Negative for dysuria, flank pain and hematuria.   Musculoskeletal: Negative for back pain.   Skin:  Negative for pallor.   Neurological: Negative for dizziness, seizures, facial asymmetry, speech difficulty and numbness.   Hematological: Does not bruise/bleed easily.   Psychiatric/Behavioral: Negative for agitation and suicidal ideas. The patient is not nervous/anxious.        Objective:      Physical Exam   Constitutional: He is oriented to person, place, and time. He appears well-developed and well-nourished.   HENT:   Head: Normocephalic and atraumatic.   Neck: No JVD present.   Cardiovascular: Normal rate, regular rhythm and normal heart sounds.    Pulmonary/Chest: Effort normal and breath sounds normal.   Abdominal: Soft. Bowel sounds are normal. There is no tenderness.   Musculoskeletal: He exhibits no edema.   ++ edema.     Neurological: He is alert and oriented to person, place, and time.   Skin: Skin is warm and dry.   Psychiatric: He has a normal mood and affect. His behavior is normal. Judgment and thought content normal.   Nursing note and vitals reviewed.      Assessment:       1. Gastroesophageal reflux disease, esophagitis presence not specified    2. Uncontrolled type 2 diabetes mellitus with complication, with long-term current use of insulin        Plan:   Vinh was seen today for gastroesophageal reflux and diarrhea.    Diagnoses and all orders for this visit:    Gastroesophageal reflux disease, esophagitis presence not specified  Tips to Control Acid Reflux  To control acid reflux, youll need to make some basic diet and lifestyle changes. The simple steps outlined below may be all youll need to relieve discomfort.  · Avoid fatty foods and spicy foods.  · Eat fewer acidic foods, such as citrus and tomato-based foods. These can increase symptoms.  · Limit drinking alcohol, caffeine, and fizzy beverages. All increase acid reflux.  · Try limiting chocolate, peppermint, and spearmint. These can worsen acid reflux in some people.  Watch When You Eat  · Avoid lying down for 3 hours after  "eating.  · Do not snack before going to bed.  Raise Your Head    Raising your head and upper body by 4" to 6" helps limit reflux when youre lying down. Put blocks under the head of the bed frame to raise it.    Uncontrolled type 2 diabetes mellitus with complication, with long-term current use of insulin  On .  Seems like a very challenging patient     Patient has uncontrolled Diabetes .  Doing classes   Seeing DM specialists     We discussed about diet ;low in calories. Avoid sweats, sodas.  Also increasing activity;walking 2-3 miles a day.   gave patient  instructions about adherence to plan.  Goal of  A1c  less than 7 % stressed.    "

## 2017-06-24 DIAGNOSIS — R11.0 NAUSEA: ICD-10-CM

## 2017-06-24 DIAGNOSIS — K21.9 GASTROESOPHAGEAL REFLUX DISEASE, ESOPHAGITIS PRESENCE NOT SPECIFIED: ICD-10-CM

## 2017-06-25 RX ORDER — PANTOPRAZOLE SODIUM 40 MG/1
TABLET, DELAYED RELEASE ORAL
Qty: 60 TABLET | Refills: 0 | Status: SHIPPED | OUTPATIENT
Start: 2017-06-25 | End: 2017-07-24 | Stop reason: SDUPTHER

## 2017-06-29 ENCOUNTER — CLINICAL SUPPORT (OUTPATIENT)
Dept: ELECTROPHYSIOLOGY | Facility: CLINIC | Age: 67
End: 2017-06-29
Payer: MEDICARE

## 2017-06-29 DIAGNOSIS — R55 SYNCOPE: Primary | ICD-10-CM

## 2017-06-29 DIAGNOSIS — Z95.818 STATUS POST PLACEMENT OF IMPLANTABLE LOOP RECORDER: ICD-10-CM

## 2017-06-29 DIAGNOSIS — R55 SYNCOPE: ICD-10-CM

## 2017-06-29 PROCEDURE — 93299 LOOP RECORDER REMOTE: CPT | Mod: PBBFAC | Performed by: INTERNAL MEDICINE

## 2017-06-29 PROCEDURE — 93297 REM INTERROG DEV EVAL ICPMS: CPT | Mod: ,,, | Performed by: INTERNAL MEDICINE

## 2017-07-02 DIAGNOSIS — R11.0 NAUSEA: Primary | ICD-10-CM

## 2017-07-03 RX ORDER — SYRINGE,SAFETY WITH NEEDLE,1ML 25GX1"
SYRINGE (EA) MISCELLANEOUS
Qty: 400 EACH | Refills: 0 | Status: SHIPPED | OUTPATIENT
Start: 2017-07-03 | End: 2017-10-03 | Stop reason: SDUPTHER

## 2017-07-03 RX ORDER — ONDANSETRON 4 MG/1
4 TABLET, FILM COATED ORAL EVERY 8 HOURS PRN
Qty: 30 TABLET | Refills: 0 | Status: SHIPPED | OUTPATIENT
Start: 2017-07-03 | End: 2017-10-06 | Stop reason: SDUPTHER

## 2017-07-06 ENCOUNTER — TELEPHONE (OUTPATIENT)
Dept: GASTROENTEROLOGY | Facility: CLINIC | Age: 67
End: 2017-07-06

## 2017-07-06 ENCOUNTER — TELEPHONE (OUTPATIENT)
Dept: INTERNAL MEDICINE | Facility: CLINIC | Age: 67
End: 2017-07-06

## 2017-07-06 DIAGNOSIS — K76.0 STEATOSIS OF LIVER: Primary | ICD-10-CM

## 2017-07-06 DIAGNOSIS — K76.6 PORTAL HYPERTENSIVE GASTROPATHY: ICD-10-CM

## 2017-07-06 DIAGNOSIS — K31.89 PORTAL HYPERTENSIVE GASTROPATHY: ICD-10-CM

## 2017-07-06 DIAGNOSIS — K74.60 HEPATIC CIRRHOSIS, UNSPECIFIED HEPATIC CIRRHOSIS TYPE: ICD-10-CM

## 2017-07-06 DIAGNOSIS — D64.9 ANEMIA OF UNKNOWN ETIOLOGY: ICD-10-CM

## 2017-07-06 DIAGNOSIS — N18.30 CKD (CHRONIC KIDNEY DISEASE), STAGE III: ICD-10-CM

## 2017-07-06 DIAGNOSIS — I10 ESSENTIAL HYPERTENSION: ICD-10-CM

## 2017-07-06 NOTE — TELEPHONE ENCOUNTER
Message sent to me from Dr. Zuñiga that patient has had a significant drop in his Hgb over past few months with normal platelets and stable kidney function.     Last visit to see me was 4/18/17. Patient lives in [place] and has been followed by Dr. Greene in [place] for worsening epigastric pain despite Protonix 40 mg BID. Carafate was added 6/6/17.    Patient was called my me today and notes he is still having some epigastric discomfort but somewhat improved on Carafate and Protonix.    His PMH is significant for evidence of probable moderate to severe cirrhosis by abdominal imaging and Fibrosure testing and most recent EGD 2/6/17 revealed congested, erythematous mucosa suspicious for portal hypertensive gastropathy. I am ordering PT, Stool Hemoccult, Hepatic Panel, Lipase, Ferritin, Serum Iron Studies, and referral to GI.     Patient is aware that if he develops any GI bleeding to go to ED.    Please call patient and schedule all tests. Please schedule next available follow up appt with Dr. Salazar.    Thank you

## 2017-07-07 ENCOUNTER — LAB VISIT (OUTPATIENT)
Dept: LAB | Facility: HOSPITAL | Age: 67
End: 2017-07-07
Attending: INTERNAL MEDICINE
Payer: MEDICARE

## 2017-07-07 DIAGNOSIS — K74.60 HEPATIC CIRRHOSIS, UNSPECIFIED HEPATIC CIRRHOSIS TYPE: ICD-10-CM

## 2017-07-07 DIAGNOSIS — K31.89 PORTAL HYPERTENSIVE GASTROPATHY: ICD-10-CM

## 2017-07-07 DIAGNOSIS — D64.9 ANEMIA OF UNKNOWN ETIOLOGY: ICD-10-CM

## 2017-07-07 DIAGNOSIS — K76.6 PORTAL HYPERTENSIVE GASTROPATHY: ICD-10-CM

## 2017-07-07 LAB
ALBUMIN SERPL BCP-MCNC: 3.5 G/DL
ALP SERPL-CCNC: 92 U/L
ALT SERPL W/O P-5'-P-CCNC: 29 U/L
AST SERPL-CCNC: 26 U/L
BASOPHILS # BLD AUTO: 0.04 K/UL
BASOPHILS NFR BLD: 0.6 %
BILIRUB DIRECT SERPL-MCNC: 0.2 MG/DL
BILIRUB SERPL-MCNC: 0.6 MG/DL
DIFFERENTIAL METHOD: ABNORMAL
EOSINOPHIL # BLD AUTO: 0.2 K/UL
EOSINOPHIL NFR BLD: 3.3 %
ERYTHROCYTE [DISTWIDTH] IN BLOOD BY AUTOMATED COUNT: 14.1 %
FERRITIN SERPL-MCNC: 26 NG/ML
HCT VFR BLD AUTO: 31.1 %
HGB BLD-MCNC: 10.1 G/DL
INR PPP: 1.1
IRON SERPL-MCNC: 103 UG/DL
LYMPHOCYTES # BLD AUTO: 1.2 K/UL
LYMPHOCYTES NFR BLD: 17.4 %
MCH RBC QN AUTO: 31.3 PG
MCHC RBC AUTO-ENTMCNC: 32.5 %
MCV RBC AUTO: 96 FL
MONOCYTES # BLD AUTO: 0.6 K/UL
MONOCYTES NFR BLD: 9.3 %
NEUTROPHILS # BLD AUTO: 4.6 K/UL
NEUTROPHILS NFR BLD: 69.4 %
PLATELET # BLD AUTO: 184 K/UL
PMV BLD AUTO: 10.4 FL
PROT SERPL-MCNC: 7.6 G/DL
PROTHROMBIN TIME: 11.2 SEC
RBC # BLD AUTO: 3.23 M/UL
SATURATED IRON: 21 %
TOTAL IRON BINDING CAPACITY: 480 UG/DL
TRANSFERRIN SERPL-MCNC: 324 MG/DL
WBC # BLD AUTO: 6.68 K/UL

## 2017-07-07 PROCEDURE — 82728 ASSAY OF FERRITIN: CPT

## 2017-07-07 PROCEDURE — 85610 PROTHROMBIN TIME: CPT

## 2017-07-07 PROCEDURE — 36415 COLL VENOUS BLD VENIPUNCTURE: CPT

## 2017-07-07 PROCEDURE — 82272 OCCULT BLD FECES 1-3 TESTS: CPT

## 2017-07-07 PROCEDURE — 85025 COMPLETE CBC W/AUTO DIFF WBC: CPT

## 2017-07-07 PROCEDURE — 83540 ASSAY OF IRON: CPT

## 2017-07-07 PROCEDURE — 80076 HEPATIC FUNCTION PANEL: CPT

## 2017-07-08 ENCOUNTER — LAB VISIT (OUTPATIENT)
Dept: LAB | Facility: HOSPITAL | Age: 67
End: 2017-07-08
Attending: INTERNAL MEDICINE
Payer: MEDICARE

## 2017-07-08 DIAGNOSIS — K74.60 HEPATIC CIRRHOSIS, UNSPECIFIED HEPATIC CIRRHOSIS TYPE: ICD-10-CM

## 2017-07-08 DIAGNOSIS — K31.89 PORTAL HYPERTENSIVE GASTROPATHY: ICD-10-CM

## 2017-07-08 DIAGNOSIS — D64.9 ANEMIA OF UNKNOWN ETIOLOGY: ICD-10-CM

## 2017-07-08 DIAGNOSIS — K76.6 PORTAL HYPERTENSIVE GASTROPATHY: ICD-10-CM

## 2017-07-08 LAB
OB PNL STL: NEGATIVE
OB PNL STL: NEGATIVE

## 2017-07-08 PROCEDURE — 82272 OCCULT BLD FECES 1-3 TESTS: CPT

## 2017-07-10 ENCOUNTER — TELEPHONE (OUTPATIENT)
Dept: ENDOSCOPY | Facility: HOSPITAL | Age: 67
End: 2017-07-10

## 2017-07-10 ENCOUNTER — TELEPHONE (OUTPATIENT)
Dept: INTERNAL MEDICINE | Facility: CLINIC | Age: 67
End: 2017-07-10

## 2017-07-10 ENCOUNTER — OFFICE VISIT (OUTPATIENT)
Dept: GASTROENTEROLOGY | Facility: CLINIC | Age: 67
End: 2017-07-10
Payer: MEDICARE

## 2017-07-10 VITALS
HEART RATE: 70 BPM | HEIGHT: 68 IN | DIASTOLIC BLOOD PRESSURE: 99 MMHG | WEIGHT: 249.13 LBS | BODY MASS INDEX: 37.76 KG/M2 | SYSTOLIC BLOOD PRESSURE: 157 MMHG

## 2017-07-10 DIAGNOSIS — K31.89 PORTAL HYPERTENSIVE GASTROPATHY: ICD-10-CM

## 2017-07-10 DIAGNOSIS — D50.9 IRON DEFICIENCY ANEMIA, UNSPECIFIED IRON DEFICIENCY ANEMIA TYPE: Primary | ICD-10-CM

## 2017-07-10 DIAGNOSIS — G47.33 OSA (OBSTRUCTIVE SLEEP APNEA): ICD-10-CM

## 2017-07-10 DIAGNOSIS — I48.0 PAROXYSMAL ATRIAL FIBRILLATION: ICD-10-CM

## 2017-07-10 DIAGNOSIS — N18.30 CKD (CHRONIC KIDNEY DISEASE) STAGE 3, GFR 30-59 ML/MIN: ICD-10-CM

## 2017-07-10 DIAGNOSIS — I50.30 HEART FAILURE WITH PRESERVED LEFT VENTRICULAR FUNCTION (HFPEF): ICD-10-CM

## 2017-07-10 DIAGNOSIS — K74.60 HEPATIC CIRRHOSIS, UNSPECIFIED HEPATIC CIRRHOSIS TYPE: Primary | ICD-10-CM

## 2017-07-10 DIAGNOSIS — Z86.010 HX OF COLONIC POLYP: ICD-10-CM

## 2017-07-10 DIAGNOSIS — K76.6 PORTAL HYPERTENSIVE GASTROPATHY: ICD-10-CM

## 2017-07-10 DIAGNOSIS — K74.60 HEPATIC CIRRHOSIS, UNSPECIFIED HEPATIC CIRRHOSIS TYPE: ICD-10-CM

## 2017-07-10 DIAGNOSIS — I25.10 CORONARY ARTERY DISEASE INVOLVING NATIVE CORONARY ARTERY OF NATIVE HEART WITHOUT ANGINA PECTORIS: ICD-10-CM

## 2017-07-10 DIAGNOSIS — I10 ESSENTIAL HYPERTENSION: ICD-10-CM

## 2017-07-10 PROCEDURE — 1159F MED LIST DOCD IN RCRD: CPT | Mod: ,,, | Performed by: PHYSICIAN ASSISTANT

## 2017-07-10 PROCEDURE — 99215 OFFICE O/P EST HI 40 MIN: CPT | Mod: S$PBB,,, | Performed by: PHYSICIAN ASSISTANT

## 2017-07-10 PROCEDURE — 99999 PR PBB SHADOW E&M-EST. PATIENT-LVL V: CPT | Mod: PBBFAC,,, | Performed by: PHYSICIAN ASSISTANT

## 2017-07-10 PROCEDURE — 1125F AMNT PAIN NOTED PAIN PRSNT: CPT | Mod: ,,, | Performed by: PHYSICIAN ASSISTANT

## 2017-07-10 PROCEDURE — 99215 OFFICE O/P EST HI 40 MIN: CPT | Mod: PBBFAC | Performed by: PHYSICIAN ASSISTANT

## 2017-07-10 RX ORDER — SIMETHICONE 125 MG
125 TABLET,CHEWABLE ORAL EVERY 6 HOURS PRN
COMMUNITY

## 2017-07-10 RX ORDER — NAPROXEN SODIUM 220 MG
TABLET ORAL
COMMUNITY
Start: 2015-04-21 | End: 2017-10-05

## 2017-07-10 RX ORDER — BETAMETHASONE DIPROPIONATE 0.5 MG/G
OINTMENT TOPICAL
COMMUNITY
Start: 2017-07-07 | End: 2018-03-22

## 2017-07-10 RX ORDER — FERROUS SULFATE 325(65) MG
1 TABLET ORAL DAILY
Qty: 90 TABLET | Refills: 1 | Status: SHIPPED | OUTPATIENT
Start: 2017-07-10 | End: 2018-03-14 | Stop reason: SDUPTHER

## 2017-07-10 NOTE — TELEPHONE ENCOUNTER
Spoke to patient and states that he stopped the iron since the beginning of the year because he thought it was sulfa----patient is wanting to know if he needs to start this again

## 2017-07-10 NOTE — TELEPHONE ENCOUNTER
Patient has an order for an EGD and colonoscopy.  Is it ok to hold Eliquis 2 days prior to procedure?  Please advise.  Thank you.  Sherron

## 2017-07-10 NOTE — TELEPHONE ENCOUNTER
----- Message from Serenity Bkaer sent at 7/10/2017  2:42 PM CDT -----  Contact: self [phone]  Patient is calling to speak with someone regarding rx ferrous sulfate 325 mg (65 mg iron) Tab tablet he quit taking above medication since the beginning of the year by the recommendation of another provider and needs to discuss it. Please call him back and advise.      Thank you

## 2017-07-10 NOTE — PROGRESS NOTES
[facility] Consultation [facility]    Reason for Consult:  The primary encounter diagnosis was Iron deficiency anemia, unspecified iron deficiency anemia type. Diagnoses of Portal hypertensive gastropathy, Hx of colonic polyp, CKD (chronic kidney disease) stage 3, GFR 30-59 ml/min, SEAN (obstructive sleep apnea), Heart failure with preserved left ventricular function (HFpEF), Paroxysmal atrial fibrillation, Coronary artery disease involving native coronary artery of native heart without angina pectoris, Essential hypertension, and Hepatic cirrhosis, unspecified hepatic cirrhosis type were also pertinent to this visit.    PCP:   Natalee Thapa       Referring MD:  No referring provider defined for this encounter.    HPI:  This is a 67 y.o. male with PMH pancreatic insufficiency and DM here to follow up on his nausea.     Referred by Dr Thapa for an urgent GI evaluation his iron def anemia.  His Hgb dropped from 11.8 in 2/2017 to 10.9 in 6/2017. Now 10.1.   Iron 103, iron sat wnl, ferritin 26,   Occult stool heme neg x 2  He admits to stopping his iron supplement in jan 2017, but he does not recall why  Today he does report chronic epigastric pain  + excessive gas  Denies melena of BRBPR    Taking protonix 40mg BID and creon  Taking carafate 3-4 times daily  miralax 17 g daily with relief of constipation  Taking his creon as directed - 2 with a meal, one with a snack,    His 2/2017 EGD revealed possible portal gastropathy, multiple gastric polyps, and a few duodenal polyps /possible Brunner's gland polyps    10/2012 colonoscopy - TA polyps x 2     His 2/2017 CT scan revealed possible cirrhosis and fatty pancreas    Taking ASA 81mg without food   Also taking Norco 10mg QID for hip pain, says he would n ot be able to stop them for a GES    S/p cholecystectomy  DM with intermittent Bs > 200. If he does not take his insulin BS can increase to 500.     ROS:  Constitutional: No fevers, chills, No  weight loss  ENT: No allergies  CV: mild chest discomfort  Pulm: No cough, + occasional shortness of breath  Ophtho: No vision changes  GI: see HPI  Derm: No rash  Heme: No lymphadenopathy, No bruising  MSK: hip pain  : No dysuria, No hematuria  Endo: No hot or cold intolerance  Neuro: No syncope, No seizure  Psych: No anxiety, No depression    Medical History:  has a past medical history of *Atrial fibrillation; Acquired hypothyroidism (5/4/2016); Allergy; Anemia; Anticoagulant long-term use; Arthritis; CAD (coronary artery disease); CHF (congestive heart failure); Chronic anticoagulation; Coronary artery disease; Depression; Diabetic nephropathy; Diabetic retinopathy associated with type 2 diabetes mellitus; Diverticulosis; Glaucoma; History of BPH; History of peripheral vascular disease; Hyperlipidemia; Hypertension; Obesity; SEAN (obstructive sleep apnea); Pancreatic insufficiency; Personal history of kidney stones; PN (peripheral neuropathy); Syncope (5/23/2016); and Type 2 diabetes mellitus not at goal.    Surgical History:  has a past surgical history that includes Tonsillectomy; Gallbladder surgery; Hernia repair; Cataract extraction bilateral w/ anterior vitrectomy; Hand surgery; Coronary artery bypass graft (2001); Retinal laser procedure; Radiofrequency ablation (8/14); Coronary angioplasty (2014); Eye surgery; Skin biopsy; Joint replacement; Abdominal surgery; Upper gastrointestinal endoscopy; and Colonoscopy.    Family History: family history includes Cancer in his father and mother; Diabetes in his father and paternal grandfather; Heart attack in his father; Heart disease in his father; Obesity in his sister..     Social History:  reports that he has never smoked. He has never used smokeless tobacco. He reports that he does not drink alcohol or use drugs.    Review of patient's allergies indicates:   Allergen Reactions    Cephalexin Hives and Shortness Of Breath    Penicillins Other (See Comments)  "    Unknown  Hardened skin    Pravastatin Other (See Comments)     myalgia    Sulfa (sulfonamide antibiotics)      Other reaction(s): Unknown       Current Outpatient Prescriptions on File Prior to Visit   Medication Sig Dispense Refill     insulin regular U-500 concentrated (HUMULIN R U-500, CONCENTRATED,) 500 unit/mL Soln 32-33 unit edgar 30 minutes before meals and night snack 60 mL 5    alprazolam (XANAX) 0.5 MG tablet Take 1 tablet (0.5 mg total) by mouth daily as needed. 30 tablet 0    aspirin 81 MG Chew Take 81 mg by mouth once daily.      blood sugar diagnostic (ACCU-CHEK JANEY PLUS TEST STRP) Strp Patient to check blood sugar 6 x's per day--Dx Code 250.62,  357.2 600 each 1    calcium carbonate-simethicone (MAALOX ADVANCED) 1,000-60 mg Chew Take 2 tablets by mouth 3 (three) times daily as needed.      cholecalciferol, vitamin D3, (VITAMIN D3) 5,000 unit Tab Take 5,000 Units by mouth. Takes one tablet every day except on Sun and Wed      ciprofloxacin HCl (CILOXAN) 0.3 % ophthalmic solution       CREON 24,000-76,000 -120,000 unit capsule TAKE 2 CAPSULES BY MOUTH THREE TIMES DAILY WITH MEALS AND 1 CAPSULE WITH A SNACK 210 capsule 3    DEXTROSE (GLUCOSE) Chew Take by mouth as needed.      ELIQUIS 5 mg Tab TAKE 1 TABLET BY MOUTH TWICE DAILY 60 tablet 5    fexofenadine (ALLEGRA) 180 MG tablet Take 180 mg by mouth once daily.      fluocinolone-shower cap 0.01 % Oil       fluticasone (FLONASE) 50 mcg/actuation nasal spray SHAKE WELL AND USE 2 SPRAYS IN EACH NOSTRIL EVERY DAY 1 Bottle 5    furosemide (LASIX) 80 MG tablet TAKE ONE TABLET BY MOUTH ONCE DAILY 30 tablet 0    gabapentin (NEURONTIN) 600 MG tablet Take 1 tablet (600 mg total) by mouth 4 (four) times daily with meals and nightly. 360 tablet 1    guaifenesin (MUCINEX) 600 mg 12 hr tablet Take 600 mg by mouth continuous prn for Congestion. Prn        insulin needles, disposable, (BD ULTRA-FINE REJI PEN NEEDLES) 32 x 5/32 " Ndle Uses 5 " daily, on multiple daily insulin injections 150 each 1    insulin syringe-needle U-100 1/2 mL 30 gauge x 5/16 Syrg USE WITH INSULIN 3 THREE TIMES DAILY TO FOUR TIMES DAILY 400 each 0    insulin syringe-needle U-100 1/2 mL 30 Syrg       isosorbide mononitrate (IMDUR) 30 MG 24 hr tablet TAKE ONE TABLET BY MOUTH ONCE DAILY 30 tablet 5    KRILL/OM3/DHA/EPA/OM6/LIP/ASTX (KRILL OIL, OMEGA 3 & 6, ORAL) Take 1 tablet by mouth every evening.       lancets (BD ULTRA FINE LANCETS) Misc 1 Units by Misc.(Non-Drug; Combo Route) route 4 (four) times daily. 200 each 11    levothyroxine (SYNTHROID) 25 MCG tablet TAKE 1 TABLET(25 MCG) BY MOUTH EVERY DAY 30 tablet 5    losartan (COZAAR) 50 MG tablet TAKE 1 TABLET BY MOUTH EVERY DAY 90 tablet 3    metolazone (ZAROXOLYN) 5 MG tablet Take 1 tablet (5 mg total) by mouth daily as needed (Weight gain of 5 pounds or more). 30 tablet 6    metoprolol tartrate (LOPRESSOR) 50 MG tablet Take 1 tablet (50 mg total) by mouth 2 (two) times daily. 60 tablet 11    MV-MN/FA/LYCOPENE/LUT/HB#178 (RAMEZ MULTIVITAMIN FOR MEN ORAL) Take by mouth.      NEVANAC 0.1 % ophthalmic suspension       nitroGLYCERIN (NITROSTAT) 0.4 MG SL tablet Place 1 tablet (0.4 mg total) under the tongue every 5 (five) minutes as needed. 25 tablet 6    ofloxacin (FLOXIN) 0.3 % otic solution Place 3 drops into both ears 2 (two) times daily.       ondansetron (ZOFRAN) 4 MG tablet Take 1 tablet (4 mg total) by mouth every 8 (eight) hours as needed for Nausea. 30 tablet 0    oxymetazoline (MUCINEX SINUS-MAX) 0.05 % nasal spray 2 sprays by Nasal route once daily.      pantoprazole (PROTONIX) 40 MG tablet TAKE 1 TABLET(40 MG) BY MOUTH TWICE DAILY 60 tablet 0    polyethylene glycol (GLYCOLAX) 17 gram/dose powder Take 17 g by mouth once daily. 1 Bottle 3    tamsulosin (FLOMAX) 0.4 mg Cp24 TAKE 1 CAPSULE BY MOUTH EVERY DAY 90 capsule 5    triamcinolone (NASACORT) 55 mcg nasal inhaler 2 sprays by Nasal route once daily.    "   VITAMIN D2 50,000 unit capsule TAKE 1 CAPSULE BY MOUTH 2 TIMES A WEEK 24 capsule 2    zolpidem (AMBIEN) 5 MG Tab TAKE 1 TABLET BY MOUTH EVERY EVENING 30 tablet 0    diclofenac sodium (VOLTAREN) 1 % Gel Apply 2 g topically 4 (four) times daily. Apply to both hand 3 Tube 2    ferrous sulfate 325 mg (65 mg iron) Tab tablet TAKE 1 TABLET BY MOUTH EVERY DAY 90 tablet 0    hydrocodone-acetaminophen 10-325mg (NORCO)  mg Tab Take 1 tablet by mouth every 6 (six) hours as needed for Pain. 120 tablet 0     No current facility-administered medications on file prior to visit.          Objective Findings:    Vital Signs:  BP (!) 157/99   Pulse 70   Ht 5' 8" (1.727 m)   Wt 113 kg (249 lb 1.9 oz)   BMI 37.88 kg/m²   Body mass index is 37.88 kg/m².    Physical Exam:  General Appearance: Well appearing in no acute distress   Head:   Normocephalic, without obvious abnormality  Eyes:    No scleral icterus  ENT: Neck supple, Lips, mucosa, and tongue normal  Lungs: CTA bilaterally in anterior and posterior fields, no wheezes, no crackles.  Heart:  Regular rate and rhythm, S1, S2 normal, no murmurs heard  Abdomen: Soft, moderate epigastric and LUQ, and LLQ tenderness, non-distended with positive bowel sounds in all four quadrants.   Extremities: 2+ pulses, 1+ pitting edema  Skin: No rash  Neurologic: AAO x 3      Labs:  Lab Results   Component Value Date    WBC 6.68 07/07/2017    HGB 10.1 (L) 07/07/2017    HCT 31.1 (L) 07/07/2017     07/07/2017    CHOL 205 (H) 04/30/2016    TRIG 248 (H) 04/30/2016    HDL 29 (L) 04/30/2016    ALT 29 07/07/2017    AST 26 07/07/2017     (L) 07/06/2017    K 4.7 07/06/2017    CL 99 07/06/2017    CREATININE 1.7 (H) 07/06/2017    BUN 37 (H) 07/06/2017    CO2 26 07/06/2017    TSH 0.274 (L) 06/06/2017    PSA 0.17 07/10/2013    INR 1.1 07/07/2017    HGBA1C 9.8 (H) 04/18/2017     Lab Results   Component Value Date    OCCULTBLOOD Negative 07/08/2017    OCCULTBLOOD Negative 07/07/2017    " OCCULTBLOOD Negative 07/20/2014       Imaging:  His 2/2017 CT scan revealed possible cirrhosis and fatty pancreas    Endoscopy:    2/2015 EUS - pancreatic parechyma abnormalities, pancreaus divisum.  2014 EGD - gastric and duodenal polyps, esophagitis    His 2/2017 EGD revealed possible portal gastropathy, multiple gastric polyps, and a few duodenal polyps/ possible Brunner's gland polyps    10/2012 colonoscopy - TA polyps x 2  Assessment:  1. Iron deficiency anemia, unspecified iron deficiency anemia type    2. Portal hypertensive gastropathy    3. Hx of colonic polyp    4. CKD (chronic kidney disease) stage 3, GFR 30-59 ml/min    5. SEAN (obstructive sleep apnea)    6. Heart failure with preserved left ventricular function (HFpEF)    7. Paroxysmal atrial fibrillation    8. Coronary artery disease involving native coronary artery of native heart without angina pectoris    9. Essential hypertension    10. Hepatic cirrhosis, unspecified hepatic cirrhosis type    ASA 81mg use     66yo M with CKD with drop in H/H in the past 7 months.. Need to rule out GI bleeding. Occult stool Heme Neg x 2    Recommendations:  1. Schedule colonoscopy to rule out GI bleeding and malignancies    2. Schedule EGD to rule out sources of GI bleeding    Pt is high risk for endoscopies with several co-morbidities including SEAN, CAD, CHF, A. Fib. HTN., DM. He is on Eliquis and will need to stop prior to the endoscopy.    No Follow-up on file.       Order summary:  Orders Placed This Encounter    Case request GI: ESOPHAGOGASTRODUODENOSCOPY (EGD), COLONOSCOPY         Thank you so much for allowing me to participate in the care of Vinh Flower PA-C

## 2017-07-11 ENCOUNTER — TELEPHONE (OUTPATIENT)
Dept: ENDOSCOPY | Facility: HOSPITAL | Age: 67
End: 2017-07-11

## 2017-07-11 ENCOUNTER — TELEPHONE (OUTPATIENT)
Dept: ENDOCRINOLOGY | Facility: CLINIC | Age: 67
End: 2017-07-11

## 2017-07-11 ENCOUNTER — TELEPHONE (OUTPATIENT)
Dept: ELECTROPHYSIOLOGY | Facility: CLINIC | Age: 67
End: 2017-07-11

## 2017-07-11 DIAGNOSIS — Z12.11 SPECIAL SCREENING FOR MALIGNANT NEOPLASMS, COLON: Primary | ICD-10-CM

## 2017-07-11 DIAGNOSIS — K74.60 HEPATIC CIRRHOSIS, UNSPECIFIED HEPATIC CIRRHOSIS TYPE: ICD-10-CM

## 2017-07-11 RX ORDER — POLYETHYLENE GLYCOL 3350, SODIUM SULFATE ANHYDROUS, SODIUM BICARBONATE, SODIUM CHLORIDE, POTASSIUM CHLORIDE 236; 22.74; 6.74; 5.86; 2.97 G/4L; G/4L; G/4L; G/4L; G/4L
4 POWDER, FOR SOLUTION ORAL ONCE
Qty: 4000 ML | Refills: 0 | Status: SHIPPED | OUTPATIENT
Start: 2017-07-11 | End: 2017-07-11

## 2017-07-11 NOTE — TELEPHONE ENCOUNTER
Patient contacted to schedule EGD  and colonoscopy and also to inform him of receiving approval from the arrhythmia clinic to hold Eliquis.  Scheduled for 7/21/17 at 9:00 am with Dr. Sotelo.  Went over prep instructions and informed to hold Eliquis 2 days prior to procedure.   Explained to him that he needed to go to the lab for blood work on the 2nd floor at 7:30 am, prior to checking in for procedure.  Verbally stated understanding.  Instructions mailed to address in chart.  No further questions at this time.

## 2017-07-11 NOTE — TELEPHONE ENCOUNTER
----- Message from Debra Wheatley sent at 7/11/2017  3:37 PM CDT -----  Contact: Pt: [phone]  Pt is asking for a call back about his insulin and upcoming surgery.  Pt can be reached at [phone].  Please call.    Thanks

## 2017-07-11 NOTE — TELEPHONE ENCOUNTER
Per protocol, pt does not have history of TIA/CVA >> ok to hold eliquis 48 hrs prior to procedure.

## 2017-07-13 ENCOUNTER — TELEPHONE (OUTPATIENT)
Dept: ENDOCRINOLOGY | Facility: CLINIC | Age: 67
End: 2017-07-13

## 2017-07-13 NOTE — TELEPHONE ENCOUNTER
Spoke  with patient this morning in regards to the message that was sent on yesterday. Patient has questions about his insulin, patient stated that he having surgery soon the instruction was after surgery was no insulin, he would be on a liquid diet only. Patient is on Humulin U R 500 , test 3x a day. Time of testing 7-2-10 . Patient want to know what to do about his insulin.

## 2017-07-14 ENCOUNTER — TELEPHONE (OUTPATIENT)
Dept: ELECTROPHYSIOLOGY | Facility: CLINIC | Age: 67
End: 2017-07-14

## 2017-07-14 NOTE — TELEPHONE ENCOUNTER
Please call pt.   While he is not eating and/or drinking only clear liquids, he will cut back on his doses significantly. Please instruct him to decrease Humulin U500 dose to 18 units twice daily, 12 hours apart. He may resume his current doses when he is eating as usual again.   Carry glucose tabs in the event of hypoglycemia.

## 2017-07-14 NOTE — TELEPHONE ENCOUNTER
Returned Pt's call. He wondered if he could have MRI of back with [address] linq recorder. Instructed Pt that the device is MRI compatible but he would have to transmit data before and after procedure so no data is lost. Pt voiced understanding. He will call to let us know when he schedules.

## 2017-07-14 NOTE — TELEPHONE ENCOUNTER
----- Message from Na Don sent at 7/14/2017 12:05 PM CDT -----  Contact: Pt called  Pt called, and is wondering if he can receive a MRI while having a device. Ph [phone]. Thank you

## 2017-07-17 NOTE — TELEPHONE ENCOUNTER
Spoke with patient about the instruction you want him to do, decrease Humulin U500 dose to 18 units twice daily, 12 hours apart. He can resume his current doses when he is eating as usual again. Carry glucose tabs in the event of hypoglycemia.    Patient still doesn't understand the instruction , what he need to do the morning of the surgery . Patient won't be drinking or eating. Patient only wants to speak with Raiza.

## 2017-07-19 ENCOUNTER — OFFICE VISIT (OUTPATIENT)
Dept: NEPHROLOGY | Facility: CLINIC | Age: 67
End: 2017-07-19
Payer: MEDICARE

## 2017-07-19 VITALS
HEIGHT: 68 IN | SYSTOLIC BLOOD PRESSURE: 144 MMHG | HEART RATE: 53 BPM | WEIGHT: 249.31 LBS | OXYGEN SATURATION: 97 % | DIASTOLIC BLOOD PRESSURE: 64 MMHG | BODY MASS INDEX: 37.79 KG/M2

## 2017-07-19 DIAGNOSIS — E11.21 DIABETIC NEPHROPATHY ASSOCIATED WITH TYPE 2 DIABETES MELLITUS: ICD-10-CM

## 2017-07-19 DIAGNOSIS — N28.1 ACQUIRED RENAL CYST OF RIGHT KIDNEY: ICD-10-CM

## 2017-07-19 DIAGNOSIS — I12.9 HYPERTENSIVE CKD (CHRONIC KIDNEY DISEASE): ICD-10-CM

## 2017-07-19 DIAGNOSIS — D50.9 IRON DEFICIENCY ANEMIA, UNSPECIFIED IRON DEFICIENCY ANEMIA TYPE: ICD-10-CM

## 2017-07-19 DIAGNOSIS — N18.30 CKD (CHRONIC KIDNEY DISEASE) STAGE 3, GFR 30-59 ML/MIN: Primary | ICD-10-CM

## 2017-07-19 PROCEDURE — 1159F MED LIST DOCD IN RCRD: CPT | Mod: ,,, | Performed by: INTERNAL MEDICINE

## 2017-07-19 PROCEDURE — 3046F HEMOGLOBIN A1C LEVEL >9.0%: CPT | Mod: ,,, | Performed by: INTERNAL MEDICINE

## 2017-07-19 PROCEDURE — 3066F NEPHROPATHY DOC TX: CPT | Mod: ,,, | Performed by: INTERNAL MEDICINE

## 2017-07-19 PROCEDURE — 1125F AMNT PAIN NOTED PAIN PRSNT: CPT | Mod: ,,, | Performed by: INTERNAL MEDICINE

## 2017-07-19 PROCEDURE — 99215 OFFICE O/P EST HI 40 MIN: CPT | Mod: S$PBB,,, | Performed by: INTERNAL MEDICINE

## 2017-07-19 PROCEDURE — 99213 OFFICE O/P EST LOW 20 MIN: CPT | Mod: PBBFAC | Performed by: INTERNAL MEDICINE

## 2017-07-19 PROCEDURE — 99999 PR PBB SHADOW E&M-EST. PATIENT-LVL III: CPT | Mod: PBBFAC,,, | Performed by: INTERNAL MEDICINE

## 2017-07-19 NOTE — PROGRESS NOTES
Subjective:       Patient ID: Vinh Castro is a 67 y.o. White male who presents for follow up of Chronic Kidney Disease    HPI     Mr. Castro was seen in nephrology clinic in follow up for CKD. He arrived by himself today. Pt had prior nephrology clinic visits in 2013 and then 2015 with Dr. Buckley. Since then, however, he was lost for follow up. He was seen in new patient evaluation on 4/18/17.     Pt has longstanding and very poorly controlled diabetes, CKD, hypertension, proteinuria, CAD, A fib, pancreatic insufficiency, SEAN, PVD, obesity, cirrhosis presumed to be due to PARRISH and other medical problems. He denies NSAID use. He has diabetic neuropathy and retinopathy.    Pt has been on losartan based regimen. He has had prior hyperkalemia. He is no longer taking vit D, K supplements, Ca supplements.    He denies any vomiting/ diarrhea/ flank pain/ leg swelling. He denies any urinary symptoms. He has upper abdominal discomfort, reflux symptoms.     Of note pre clinic labs showed worsening of his chronic anemia which was felt to be unrelated to his CKD stage. This was discussed with PCP Dr. Thapa. She suspected GI etiology and she has scheduled GI visit for patient. He will have GI endoscopy done in couple of days.     Prior serial labs noted for creatinine of 1.6 since 2012. His most recent baseline has been around 1.7 to 1.8. He has chronic anemia. He has proteinuria of around 2 grams from 2013.     Labs from 7/6/17 noted for Na 134, K 4.7, bicarbonate 26, BUN 37, creatinine 1.7, eGFR 40.5, Ca 9.3, phos 3.3, albumin 3.6, vit D 39, PTH 42.8, most recent Hb 10.1, iron sat 21, ferritin 26, SPE/ ALEE was normal. Urinalysis showed 3+ glucose, urine PC ratio was 0.32. US kidneys from 7/6/17 noted for limited study due to bowel gas and motion obscuring details per radiology report, it showed benign appearing lesion of right kidney possibly a cyst, cortical thinning. Kidney size 10.9 and 13.1 cm.     Review of Systems    Constitutional: Negative for appetite change, chills, fatigue and fever.   HENT: Negative for hearing loss, sneezing and sore throat.    Eyes: Negative for pain and discharge.   Respiratory: Negative for cough, shortness of breath and wheezing.    Cardiovascular: Negative for chest pain and leg swelling.   Gastrointestinal: Negative for abdominal pain, diarrhea, nausea and vomiting.   Endocrine: Negative for polydipsia and polyuria.   Genitourinary: Negative for dysuria, flank pain, frequency and hematuria.   Musculoskeletal: Negative for back pain and myalgias.   Skin: Negative for pallor and rash.   Allergic/Immunologic: Negative for environmental allergies.   Neurological: Negative for dizziness, light-headedness and headaches.   Psychiatric/Behavioral: Negative for behavioral problems. The patient is not nervous/anxious.        Objective:      Physical Exam   Constitutional: He is oriented to person, place, and time. He appears well-developed. No distress.   Abdominal obesity    HENT:   Head: Normocephalic and atraumatic.   Nose: Nose normal.   Mouth/Throat: Oropharynx is clear and moist.   Eyes: Right eye exhibits no discharge. Left eye exhibits no discharge. No scleral icterus.   Neck: Normal range of motion. Neck supple.   Large thick neck   Cardiovascular: Normal rate, regular rhythm and normal heart sounds.    Pulmonary/Chest: Effort normal and breath sounds normal. No respiratory distress. He has no wheezes. He has no rales.   Abdominal: Soft.   Morbid obesity, cannot assess for organomegaly    Musculoskeletal: He exhibits no edema.   Neurological: He is alert and oriented to person, place, and time.   Speech and cognition normal   Skin: Skin is warm and dry. He is not diaphoretic.   Psychiatric: He has a normal mood and affect. His behavior is normal.   Vitals reviewed.      Assessment:       1. CKD (chronic kidney disease) stage 3, GFR 30-59 ml/min    2. Diabetic nephropathy associated with type 2  diabetes mellitus    3. Hypertensive CKD (chronic kidney disease)    4. Acquired renal cyst of right kidney    5. Iron deficiency anemia, unspecified iron deficiency anemia type        Plan:     Mr. Castro has longstanding and poorly controlled diabetes with A1C above 10 for a long time, end organ damage due to diabetes causing neuropathy, retinopathy, hypertension, morbid obesity, atherosclerotic vascular disease and multiple other medical problems. He has CKD III with proteinuria which is most likely due to uncontrolled diabetes for a long time along with hypertension and morbid obesity. He has had prior hyperkalemia. I have advised him to stop K supplements. I discussed with pt again in detail about his CKD diagnosis, onset, serial labs, CKD staging, low salt diet, hydration with water, avoiding K supplements etc.    He has severe iron deficiency anemia and will have GI endoscopy in couple of days, especially in light of his upper GI symptoms.     Plan  - periodically monitor renal panel for electrolytes, acid base status, eGFR  - potential risk of CKD progression d/w patient, especially in light of poorly controlled diabetes  - continue losartan based regimen  - monitor BP at home, his goal should be less than 130/80  - strict low salt diet  - obtain PTH levels, vit D, he has been off vit D now, levels appear to be acceptable, will trend again  - follow urine studies for proteinuria  - obtain repeat US kidney for kidney size, rule out mass/ cyst, recent US was of limited value due to bowel gas and motion   - avoid NSAID/ bactrim/ IV contrast/ gadolinium/ aminoglycoside where possible    Plan, labs, recommendations were discussed with patient, his questions were answered to his satisfaction.   RTC 3 months   Attempt to schedule appointment the same day as [place] [facility] [place] and Friday)

## 2017-07-19 NOTE — TELEPHONE ENCOUNTER
Spoke with pt.  Starts clear liquids tomorrow.   Today reports insulin is taken at 10-14-22, regardless of meals. States this was the instruction he was given at diabetes class. Takes last dose of insulin at 10PM.   Start with 18U tonight, then continue dose every 12 hours.   Resume normal doses when eating normally. Take before meals, not at set times.    Carry glucose tabs.

## 2017-07-21 ENCOUNTER — ANESTHESIA (OUTPATIENT)
Dept: ENDOSCOPY | Facility: HOSPITAL | Age: 67
End: 2017-07-21
Payer: MEDICARE

## 2017-07-21 ENCOUNTER — SURGERY (OUTPATIENT)
Age: 67
End: 2017-07-21

## 2017-07-21 ENCOUNTER — HOSPITAL ENCOUNTER (OUTPATIENT)
Facility: HOSPITAL | Age: 67
Discharge: HOME | End: 2017-07-21
Attending: INTERNAL MEDICINE | Admitting: INTERNAL MEDICINE
Payer: MEDICARE

## 2017-07-21 ENCOUNTER — ANESTHESIA EVENT (OUTPATIENT)
Dept: ENDOSCOPY | Facility: HOSPITAL | Age: 67
End: 2017-07-21
Payer: MEDICARE

## 2017-07-21 VITALS
OXYGEN SATURATION: 96 % | HEIGHT: 68 IN | TEMPERATURE: 97 F | HEART RATE: 51 BPM | BODY MASS INDEX: 37.13 KG/M2 | WEIGHT: 245 LBS | RESPIRATION RATE: 18 BRPM | SYSTOLIC BLOOD PRESSURE: 118 MMHG | DIASTOLIC BLOOD PRESSURE: 56 MMHG

## 2017-07-21 VITALS — RESPIRATION RATE: 16 BRPM

## 2017-07-21 DIAGNOSIS — D50.9 IRON DEFICIENCY ANEMIA: ICD-10-CM

## 2017-07-21 LAB — POCT GLUCOSE: 286 MG/DL (ref 70–110)

## 2017-07-21 PROCEDURE — D9220A PRA ANESTHESIA: Mod: CRNA,,, | Performed by: NURSE ANESTHETIST, CERTIFIED REGISTERED

## 2017-07-21 PROCEDURE — 45385 COLONOSCOPY W/LESION REMOVAL: CPT | Mod: 22,PT,, | Performed by: INTERNAL MEDICINE

## 2017-07-21 PROCEDURE — 45381 COLONOSCOPY SUBMUCOUS NJX: CPT | Mod: ,,, | Performed by: INTERNAL MEDICINE

## 2017-07-21 PROCEDURE — 82962 GLUCOSE BLOOD TEST: CPT | Performed by: INTERNAL MEDICINE

## 2017-07-21 PROCEDURE — 88305 TISSUE EXAM BY PATHOLOGIST: CPT | Mod: 26,,,

## 2017-07-21 PROCEDURE — 37000008 HC ANESTHESIA 1ST 15 MINUTES: Performed by: INTERNAL MEDICINE

## 2017-07-21 PROCEDURE — 45380 COLONOSCOPY AND BIOPSY: CPT | Performed by: INTERNAL MEDICINE

## 2017-07-21 PROCEDURE — 45380 COLONOSCOPY AND BIOPSY: CPT | Mod: 59,,, | Performed by: INTERNAL MEDICINE

## 2017-07-21 PROCEDURE — 88305 TISSUE EXAM BY PATHOLOGIST: CPT

## 2017-07-21 PROCEDURE — 27201089 HC SNARE, DISP (ANY): Performed by: INTERNAL MEDICINE

## 2017-07-21 PROCEDURE — 25000003 PHARM REV CODE 250: Performed by: NURSE ANESTHETIST, CERTIFIED REGISTERED

## 2017-07-21 PROCEDURE — 43251 EGD REMOVE LESION SNARE: CPT | Performed by: INTERNAL MEDICINE

## 2017-07-21 PROCEDURE — 37000009 HC ANESTHESIA EA ADD 15 MINS: Performed by: INTERNAL MEDICINE

## 2017-07-21 PROCEDURE — 27200997: Performed by: INTERNAL MEDICINE

## 2017-07-21 PROCEDURE — 43251 EGD REMOVE LESION SNARE: CPT | Mod: ,,, | Performed by: INTERNAL MEDICINE

## 2017-07-21 PROCEDURE — 63600175 PHARM REV CODE 636 W HCPCS: Performed by: NURSE ANESTHETIST, CERTIFIED REGISTERED

## 2017-07-21 PROCEDURE — 45385 COLONOSCOPY W/LESION REMOVAL: CPT | Performed by: INTERNAL MEDICINE

## 2017-07-21 PROCEDURE — 45381 COLONOSCOPY SUBMUCOUS NJX: CPT | Performed by: INTERNAL MEDICINE

## 2017-07-21 PROCEDURE — D9220A PRA ANESTHESIA: Mod: ANES,,, | Performed by: ANESTHESIOLOGY

## 2017-07-21 PROCEDURE — 25000003 PHARM REV CODE 250: Performed by: INTERNAL MEDICINE

## 2017-07-21 RX ORDER — SODIUM CHLORIDE 9 MG/ML
INJECTION, SOLUTION INTRAVENOUS CONTINUOUS
Status: DISCONTINUED | OUTPATIENT
Start: 2017-07-21 | End: 2017-07-21 | Stop reason: HOSPADM

## 2017-07-21 RX ORDER — PROPOFOL 10 MG/ML
VIAL (ML) INTRAVENOUS
Status: DISCONTINUED | OUTPATIENT
Start: 2017-07-21 | End: 2017-07-21

## 2017-07-21 RX ORDER — LIDOCAINE HCL/PF 100 MG/5ML
SYRINGE (ML) INTRAVENOUS
Status: DISCONTINUED | OUTPATIENT
Start: 2017-07-21 | End: 2017-07-21

## 2017-07-21 RX ORDER — PROPOFOL 10 MG/ML
VIAL (ML) INTRAVENOUS CONTINUOUS PRN
Status: DISCONTINUED | OUTPATIENT
Start: 2017-07-21 | End: 2017-07-21

## 2017-07-21 RX ADMIN — PROPOFOL 100 MG: 10 INJECTION, EMULSION INTRAVENOUS at 10:07

## 2017-07-21 RX ADMIN — PROPOFOL 50 MG: 10 INJECTION, EMULSION INTRAVENOUS at 10:07

## 2017-07-21 RX ADMIN — SODIUM CHLORIDE: 0.9 INJECTION, SOLUTION INTRAVENOUS at 09:07

## 2017-07-21 RX ADMIN — PROPOFOL 100 MCG/KG/MIN: 10 INJECTION, EMULSION INTRAVENOUS at 10:07

## 2017-07-21 RX ADMIN — LIDOCAINE HYDROCHLORIDE 100 MG: 20 INJECTION, SOLUTION INTRAVENOUS at 10:07

## 2017-07-21 NOTE — ANESTHESIA PREPROCEDURE EVALUATION
2017  Vinh Castro is a 67 y.o., male.  Pre-operative evaluation for ESOPHAGOGASTRODUODENOSCOPY (EGD) (N/A), COLONOSCOPY (N/A)    Chief Complaint:belching  PMH:  HFpEF, CAD s/p CABG, AFL s/p RFA CTA and AFib s/p PVI, DM2 with retinopathy, nephropathy, HTN, HLD  SEAN-uses CPAP  Past Surgical History:   Procedure Laterality Date    ABDOMINAL SURGERY      CATARACT EXTRACTION BILATERAL W/ ANTERIOR VITRECTOMY      COLONOSCOPY      CORONARY ANGIOPLASTY      Patent LIMA to LAD, SVG to OM, occluded LAD, 60% Circ-x1cv stent placed    CORONARY ARTERY BYPASS GRAFT      iyer LAD SVG OM1    EYE SURGERY      retina repair    GALLBLADDER SURGERY      HAND SURGERY      LOST THREE FINGERS/left     HERNIA REPAIR      JOINT REPLACEMENT      RADIOFREQUENCY ABLATION      atrial flutter    RETINAL LASER PROCEDURE      SKIN BIOPSY      head    TONSILLECTOMY      UPPER GASTROINTESTINAL ENDOSCOPY           Vital Signs Range (Last 24H):         CBC:       Recent Labs  Lab 17  11417  0824 17  0742   WBC 7.25 6.68 7.25   RBC 3.15* 3.23* 3.28*   HGB 10.0* 10.1* 10.4*   HCT 31.0* 31.1* 31.0*    184 161   MCV 98 96 95   MCH 31.7* 31.3* 31.7*   MCHC 32.3 32.5 33.5       CMP:     Recent Labs  Lab 17  1145 17  0824   *  --    K 4.7  --    CL 99  --    CO2 26  --    BUN 37*  --    CREATININE 1.7*  --    *  --    PHOS 3.3  --    CALCIUM 9.3  --    ALBUMIN 3.6 3.5   PROT  --  7.6   ALKPHOS  --  92   ALT  --  29   AST  --  26   BILITOT  --  0.6       INR:    Recent Labs  Lab 17  0824 17  0742   INR 1.1 1.1         Diagnostic Studies:      EKD Echo:  Pre-op Assessment    I have reviewed the Patient Summary Reports.      I have reviewed the Medications.     Review of Systems  Anesthesia Hx:  No problems with previous Anesthesia     Social:  Non-Smoker    Cardiovascular:  Cardiovascular Normal     Pulmonary:  Pulmonary Normal    Neurological:  Neurology Normal        Physical Exam  General:  Morbid Obesity    Airway/Jaw/Neck:  Airway Findings: Mouth Opening: Normal Tongue: Normal  General Airway Assessment: Good  Mallampati: IV  TM Distance: Normal, at least 6 cm  Jaw/Neck Findings:  Neck ROM: Extension Decreased, Mild  Neck Findings:  Girth Increased, Short Neck      Dental:  Dental Findings: In tact   Chest/Lungs:  Chest/Lungs Findings: Clear to auscultation, Normal Respiratory Rate     Heart/Vascular:  Heart Findings: Rate: Normal  Rhythm: Regular Rhythm  Sounds: Normal        Mental Status:  Mental Status Findings:  Cooperative, Alert and Oriented         Anesthesia Plan  Type of Anesthesia, risks & benefits discussed:  Anesthesia Type:  general  Patient's Preference:   Intra-op Monitoring Plan:   Intra-op Monitoring Plan Comments:   Post Op Pain Control Plan:   Post Op Pain Control Plan Comments:   Induction:   IV  Beta Blocker:  Patient is on a Beta-Blocker and has received one dose within the past 24 hours (No further documentation required).       Informed Consent: Patient understands risks and agrees with Anesthesia plan.  Questions answered. Anesthesia consent signed with patient.  ASA Score: 3     Day of Surgery Review of History & Physical:    H&P update referred to the surgeon.     Anesthesia Plan Notes: Noted that he was intubated using Glidescope/fiberoptic intubation in past.        Ready For Surgery From Anesthesia Perspective.

## 2017-07-21 NOTE — ANESTHESIA POSTPROCEDURE EVALUATION
"Anesthesia Post Evaluation    Patient: Vinh Castro    Procedure(s) Performed: Procedure(s) (LRB):  ESOPHAGOGASTRODUODENOSCOPY (EGD) (N/A)  COLONOSCOPY (N/A)    Final Anesthesia Type: general  Patient location during evaluation: GI PACU  Patient participation: Yes- Able to Participate  Level of consciousness: awake and alert, oriented and awake  Post-procedure vital signs: reviewed and stable  Pain management: adequate  Airway patency: patent  PONV status at discharge: No PONV  Anesthetic complications: no      Cardiovascular status: blood pressure returned to baseline and hemodynamically stable  Respiratory status: unassisted, spontaneous ventilation and room air  Hydration status: euvolemic  Follow-up not needed.        Visit Vitals  BP (!) 118/56 (BP Location: Left arm, Patient Position: Lying, BP Method: Automatic)   Pulse (!) 51   Temp 36.3 °C (97.4 °F) (Oral)   Resp 18   Ht 5' 8" (1.727 m)   Wt 111.1 kg (245 lb)   SpO2 96%   BMI 37.25 kg/m²       Pain/Devante Score: Pain Assessment Performed: Yes (7/21/2017 12:31 PM)  Presence of Pain: denies (7/21/2017 12:31 PM)  Devante Score: 10 (7/21/2017 12:21 PM)      "

## 2017-07-21 NOTE — PATIENT INSTRUCTIONS
Discharge Summary/Instructions after an Endoscopic Procedure  Patient Name: Vinh Castro  Patient MRN: [id number]  Patient YOB: 1950 Friday, July 21, 2017  Roberto Sotelo MD  RESTRICTIONS ON ACTIVITY:  - DO NOT drive a car, operate machinery, make legal/financial decisions, or   drink alcohol until the day after the procedure.    - The following day: return to full activity including work, except no heavy   lifting, straining or running for 3 days if polyps were removed.  - Diet: Eat and drink normally unless instructed otherwise.  TREATMENT FOR COMMON SIDE EFFECTS:  - Mild abdominal pain, bloating or excessive gas: rest, eat lightly and use   a heating pad.  - Sore Throat - treat with throat lozenges. Gargle with warm salt water.  SYMPTOMS TO WATCH FOR AND REPORT TO YOUR PHYSICIAN:  1. Severe abdominal pain or bloating.  2. Pain in chest.  3. Chills or fever occurring within 24 hours after a procedure.  4. A large amount of rectal bleeding, which would show as bright red,   maroon, or black stools. (A small amount of blood from the rectum is not   serious, especially if hemorrhoids are present.)  5. Because air was used during the procedure, expelling large amounts of air   from your rectum or belching is normal.  6. If a bowel prep was taken, you may not have a bowel movement for 1-3   days.  This is normal.  7. Go directly to the emergency room if you notice any of the following:   Chills and/or fever over 101 F   Persistent vomiting   Severe abdominal pain, other than gas cramps   Severe chest pain   Black, tarry stools   Any bleeding - exceeding one tablespoon  Your doctor recommends these additional instructions:  If any biopsies were performed, my office will call you in 5 to 6 business   days with any results.  You are being discharged to home.   We are waiting for your pathology results.   Telephone your endoscopist for pathology results in two weeks.   Your physician has recommended a repeat  colonoscopy in one year for   surveillance, for surveillance based on pathology results and for   surveillance of multiple polyps.   Return to your GI clinic at the next available appointment.   Resume taking Eliquis (apixaban) at your prior dose in 2 days.   Consider avoiding all non-steroidal anti-inflammatory drugs (aspirin,   ibuprofen, naproxen, etc.), unless needed for cardiovascular protection.    Recommend you discuss with your prescribing doctor (of your aspirin) to see   if cardiovascular benefits of your aspirin outweigh the risks of GI   bleeding.  The findings and recommendations have been discussed with you.   Return to your physician assistant.  For questions, problems or results please call your physician - Roberto Sotelo MD at Work:  [phone].  [facility], EMERGENCY ROOM PHONE NUMBER: [phone]  IF A COMPLICATION OR EMERGENCY SITUATION ARISES AND YOU ARE UNABLE TO REACH   YOUR PHYSICIAN - GO TO THE EMERGENCY ROOM.  Roberto Sotelo MD  7/21/2017 12:01:47 PM  This report has been verified and signed electronically.

## 2017-07-21 NOTE — TRANSFER OF CARE
"Anesthesia Transfer of Care Note    Patient: Vinh Castro    Procedure(s) Performed: Procedure(s) (LRB):  ESOPHAGOGASTRODUODENOSCOPY (EGD) (N/A)  COLONOSCOPY (N/A)    Patient location: PACU    Anesthesia Type: general    Transport from OR: Transported from OR on 6-10 L/min O2 by face mask with adequate spontaneous ventilation    Post pain: adequate analgesia    Post assessment: no apparent anesthetic complications and tolerated procedure well    Post vital signs: stable    Level of consciousness: awake and alert    Nausea/Vomiting: no nausea/vomiting    Complications: none    Transfer of care protocol was followed      Last vitals:   Visit Vitals  BP (!) 144/64 (BP Location: Left arm, Patient Position: Lying, BP Method: Automatic)   Pulse (!) 57   Temp 36.3 °C (97.4 °F) (Oral)   Resp 16   Ht 5' 8" (1.727 m)   Wt 111.1 kg (245 lb)   SpO2 100%   BMI 37.25 kg/m²     "

## 2017-07-21 NOTE — DISCHARGE INSTRUCTIONS

## 2017-07-21 NOTE — H&P
[facility]  History & Physical    Subjective:      Chief Complaint/Reason for Admission:     EGD and colonoscopy average risk for CRC by history today.    Vinh Castro is a 67 y.o. male.    Past Medical History:   Diagnosis Date    *Atrial fibrillation     Acquired hypothyroidism 5/4/2016    Allergy     Anemia     Anticoagulant long-term use     Arthritis     CAD (coronary artery disease)     CABG 2002, PCI    CHF (congestive heart failure)     Chronic anticoagulation     Coronary artery disease     Depression     Diabetic nephropathy     Diabetic retinopathy associated with type 2 diabetes mellitus     Diverticulosis     Glaucoma     patient denies having glaucoma    History of BPH     History of peripheral vascular disease     Bilateral Carotid Artery Stenosis < 40% on Carotid US 7/2012    Hyperlipidemia     Hypertension     Obesity     SEAN (obstructive sleep apnea)     uses C-PAP    Pancreatic insufficiency     Personal history of kidney stones     PN (peripheral neuropathy)     Syncope 5/23/2016    Type 2 diabetes mellitus not at goal      Past Surgical History:   Procedure Laterality Date    ABDOMINAL SURGERY      CATARACT EXTRACTION BILATERAL W/ ANTERIOR VITRECTOMY      COLONOSCOPY      CORONARY ANGIOPLASTY  2014    Patent LIMA to LAD, SVG to OM, occluded LAD, 60% Circ-x1cv stent placed    CORONARY ARTERY BYPASS GRAFT  2001    iyer LAD SVG OM1    EYE SURGERY      retina repair    GALLBLADDER SURGERY      HAND SURGERY      LOST THREE FINGERS/left 1979    HERNIA REPAIR      JOINT REPLACEMENT      RADIOFREQUENCY ABLATION  8/14    atrial flutter    RETINAL LASER PROCEDURE      SKIN BIOPSY      head    TONSILLECTOMY      UPPER GASTROINTESTINAL ENDOSCOPY       Family History   Problem Relation Age of Onset    Cancer Mother      Rhumatoid lung disease    Diabetes Father     Heart attack Father     Heart disease Father     Cancer Father      Lymphona     Obesity Sister     Diabetes Paternal Grandfather     Anesthesia problems Neg Hx     Colon cancer Neg Hx     Esophageal cancer Neg Hx      Social History   Substance Use Topics    Smoking status: Never Smoker    Smokeless tobacco: Never Used    Alcohol use No       PTA Medications   Medication Sig     insulin regular U-500 concentrated (HUMULIN R U-500, CONCENTRATED,) 500 unit/mL Soln 32-33 unit edgar 30 minutes before meals and night snack    betamethasone dipropionate (DIPROLENE) 0.05 % ointment     ciprofloxacin HCl (CILOXAN) 0.3 % ophthalmic solution     ELIQUIS 5 mg Tab TAKE 1 TABLET BY MOUTH TWICE DAILY    fexofenadine (ALLEGRA) 180 MG tablet Take 180 mg by mouth once daily.    fluticasone (FLONASE) 50 mcg/actuation nasal spray SHAKE WELL AND USE 2 SPRAYS IN EACH NOSTRIL EVERY DAY    furosemide (LASIX) 80 MG tablet TAKE ONE TABLET BY MOUTH ONCE DAILY    guaifenesin (MUCINEX) 600 mg 12 hr tablet Take 600 mg by mouth continuous prn for Congestion. Prn      isosorbide mononitrate (IMDUR) 30 MG 24 hr tablet TAKE ONE TABLET BY MOUTH ONCE DAILY    levothyroxine (SYNTHROID) 25 MCG tablet TAKE 1 TABLET(25 MCG) BY MOUTH EVERY DAY    losartan (COZAAR) 50 MG tablet TAKE 1 TABLET BY MOUTH EVERY DAY    metolazone (ZAROXOLYN) 5 MG tablet Take 1 tablet (5 mg total) by mouth daily as needed (Weight gain of 5 pounds or more).    metoprolol tartrate (LOPRESSOR) 50 MG tablet Take 1 tablet (50 mg total) by mouth 2 (two) times daily.    MV-MN/FA/LYCOPENE/LUT/HB#178 (RAMEZ MULTIVITAMIN FOR MEN ORAL) Take by mouth.    NEVANAC 0.1 % ophthalmic suspension     oxymetazoline (MUCINEX SINUS-MAX) 0.05 % nasal spray 2 sprays by Nasal route once daily.    pantoprazole (PROTONIX) 40 MG tablet TAKE 1 TABLET(40 MG) BY MOUTH TWICE DAILY    polyethylene glycol (GLYCOLAX) 17 gram/dose powder Take 17 g by mouth once daily.    simethicone (MYLICON) 125 MG chewable tablet Take 125 mg by mouth every 6 (six) hours as needed  "for Flatulence.    tamsulosin (FLOMAX) 0.4 mg Cp24 TAKE 1 CAPSULE BY MOUTH EVERY DAY    triamcinolone (NASACORT) 55 mcg nasal inhaler 2 sprays by Nasal route once daily.    alprazolam (XANAX) 0.5 MG tablet Take 1 tablet (0.5 mg total) by mouth daily as needed.    aspirin 81 MG Chew Take 81 mg by mouth once daily.    blood sugar diagnostic (ACCU-CHEK JANEY PLUS TEST STRP) Strp Patient to check blood sugar 6 x's per day--Dx Code 250.62,  357.2    calcium carbonate-simethicone (MAALOX ADVANCED) 1,000-60 mg Chew Take 2 tablets by mouth 3 (three) times daily as needed.    CREON 24,000-76,000 -120,000 unit capsule TAKE 2 CAPSULES BY MOUTH THREE TIMES DAILY WITH MEALS AND 1 CAPSULE WITH A SNACK    DEXTROSE (GLUCOSE) Chew Take by mouth as needed.    diclofenac sodium (VOLTAREN) 1 % Gel Apply 2 g topically 4 (four) times daily. Apply to both hand    ferrous sulfate 325 mg (65 mg iron) Tab tablet Take 1 tablet (325 mg total) by mouth once daily.    fluocinolone-shower cap 0.01 % Oil     gabapentin (NEURONTIN) 600 MG tablet Take 1 tablet (600 mg total) by mouth 4 (four) times daily with meals and nightly.    hydrocodone-acetaminophen 10-325mg (NORCO)  mg Tab Take 1 tablet by mouth every 6 (six) hours as needed for Pain.    insulin needles, disposable, (BD ULTRA-FINE REJI PEN NEEDLES) 32 x 5/32 " Ndle Uses 5 daily, on multiple daily insulin injections    insulin syringe-needle U-100 1/2 mL 30 gauge Syrg     insulin syringe-needle U-100 1/2 mL 30 gauge x 5/16 Syrg USE WITH INSULIN 3 THREE TIMES DAILY TO FOUR TIMES DAILY    insulin syringe-needle U-100 1/2 mL 30 Syrg     KRILL/OM3/DHA/EPA/OM6/LIP/ASTX (KRILL OIL, OMEGA 3 & 6, ORAL) Take 1 tablet by mouth every evening.     lancets (BD ULTRA FINE LANCETS) Misc 1 Units by Misc.(Non-Drug; Combo Route) route 4 (four) times daily.    nitroGLYCERIN (NITROSTAT) 0.4 MG SL tablet Place 1 tablet (0.4 mg total) under the tongue every 5 (five) minutes as needed.    " ofloxacin (FLOXIN) 0.3 % otic solution Place 3 drops into both ears 2 (two) times daily.     ondansetron (ZOFRAN) 4 MG tablet Take 1 tablet (4 mg total) by mouth every 8 (eight) hours as needed for Nausea.    zolpidem (AMBIEN) 5 MG Tab TAKE 1 TABLET BY MOUTH EVERY EVENING     Review of patient's allergies indicates:   Allergen Reactions    Cephalexin Hives and Shortness Of Breath    Penicillins Other (See Comments)     Unknown  Hardened skin    Pravastatin Other (See Comments)     myalgia    Sulfa (sulfonamide antibiotics)      Other reaction(s): Unknown    Adhesive Rash        Review of Systems   Constitutional: Negative for chills, fever and weight loss.   Respiratory: Negative for shortness of breath and wheezing.    Cardiovascular: Negative for chest pain.   Gastrointestinal: Positive for abdominal pain and heartburn. Negative for blood in stool, constipation, diarrhea, melena, nausea and vomiting.       Objective:      Vital Signs (Most Recent)  Temp: 98.1 °F (36.7 °C) (07/21/17 0930)  Pulse: 62 (07/21/17 0930)  Resp: 18 (07/21/17 0930)  BP: (!) 144/62 (07/21/17 0930)  SpO2: 97 % (07/21/17 0930)    Vital Signs Range (Last 24H):  Temp:  [98.1 °F (36.7 °C)]   Pulse:  [62]   Resp:  [13-18]   BP: (144)/(62)   SpO2:  [97 %]     Physical Exam   Constitutional: He is oriented to person, place, and time. He appears well-developed and well-nourished.   Cardiovascular: Normal rate.    Pulmonary/Chest: Effort normal and breath sounds normal.   Abdominal: Soft. There is no tenderness.   Neurological: He is alert and oriented to person, place, and time.   Skin: Skin is warm and dry.   Psychiatric: He has a normal mood and affect. His behavior is normal. Judgment and thought content normal.            Assessment:      Active Hospital Problems    Diagnosis  POA    Iron deficiency anemia [D50.9]  Yes      Resolved Hospital Problems    Diagnosis Date Resolved POA   No resolved problems to display.       Plan:    EGD and  colonoscopy for epigastric pain and iron deficiency anemia.

## 2017-07-24 DIAGNOSIS — R11.0 NAUSEA: ICD-10-CM

## 2017-07-24 DIAGNOSIS — K21.9 GASTROESOPHAGEAL REFLUX DISEASE, ESOPHAGITIS PRESENCE NOT SPECIFIED: ICD-10-CM

## 2017-07-24 RX ORDER — PANTOPRAZOLE SODIUM 40 MG/1
TABLET, DELAYED RELEASE ORAL
Qty: 60 TABLET | Refills: 3 | Status: SHIPPED | OUTPATIENT
Start: 2017-07-24 | End: 2017-11-30 | Stop reason: SDUPTHER

## 2017-07-25 ENCOUNTER — NURSE TRIAGE (OUTPATIENT)
Dept: ADMINISTRATIVE | Facility: CLINIC | Age: 67
End: 2017-07-25

## 2017-07-25 NOTE — TELEPHONE ENCOUNTER
Scheduled for fasting lab today . Pt is a diabetic. Blood glucose was low and needed a sugar tab at 2am. Wanting to know if he can have labs done on 7/26 instead of 7/25. Pt advised to contact Garces NP when office opens. Pt states complications with contacting office. Requesting office contacts him when open to advise if best to go around 10am today or wait until tomorrow.  at present time.    Reason for Disposition   [1] Caller requesting NON-URGENT health information AND [2] PCP's office is the best resource    Protocols used: ST INFORMATION ONLY CALL-A-AH

## 2017-07-26 ENCOUNTER — LAB VISIT (OUTPATIENT)
Dept: LAB | Facility: HOSPITAL | Age: 67
End: 2017-07-26
Attending: NURSE PRACTITIONER
Payer: MEDICARE

## 2017-07-26 LAB
CHOLEST/HDLC SERPL: 3.9 {RATIO}
ESTIMATED AVG GLUCOSE: 203 MG/DL
HBA1C MFR BLD HPLC: 8.7 %
HDL/CHOLESTEROL RATIO: 25.7 %
HDLC SERPL-MCNC: 144 MG/DL
HDLC SERPL-MCNC: 37 MG/DL
LDLC SERPL CALC-MCNC: 71 MG/DL
NONHDLC SERPL-MCNC: 107 MG/DL
TRIGL SERPL-MCNC: 180 MG/DL

## 2017-07-26 PROCEDURE — 80061 LIPID PANEL: CPT

## 2017-07-26 PROCEDURE — 36415 COLL VENOUS BLD VENIPUNCTURE: CPT

## 2017-07-26 PROCEDURE — 83036 HEMOGLOBIN GLYCOSYLATED A1C: CPT

## 2017-07-26 NOTE — PATIENT INSTRUCTIONS
Discharge Summary/Instructions after an Endoscopic Procedure  Patient Name: Vinh Castro  Patient MRN: [id number]  Patient YOB: 1950 Friday, July 21, 2017  Roberto Sotelo MD  RESTRICTIONS ON ACTIVITY:  - DO NOT drive a car, operate machinery, make legal/financial decisions, or   drink alcohol until the day after the procedure.    - The following day: return to full activity including work, except no heavy   lifting, straining or running for 3 days if polyps were removed.  - Diet: Eat and drink normally unless instructed otherwise.  TREATMENT FOR COMMON SIDE EFFECTS:  - Mild abdominal pain, bloating or excessive gas: rest, eat lightly and use   a heating pad.  - Sore Throat - treat with throat lozenges. Gargle with warm salt water.  SYMPTOMS TO WATCH FOR AND REPORT TO YOUR PHYSICIAN:  1. Severe abdominal pain or bloating.  2. Pain in chest.  3. Chills or fever occurring within 24 hours after a procedure.  4. A large amount of rectal bleeding, which would show as bright red,   maroon, or black stools. (A small amount of blood from the rectum is not   serious, especially if hemorrhoids are present.)  5. Because air was used during the procedure, expelling large amounts of air   from your rectum or belching is normal.  6. If a bowel prep was taken, you may not have a bowel movement for 1-3   days.  This is normal.  7. Go directly to the emergency room if you notice any of the following:   Chills and/or fever over 101 F   Persistent vomiting   Severe abdominal pain, other than gas cramps   Severe chest pain   Black, tarry stools   Any bleeding - exceeding one tablespoon  Your doctor recommends these additional instructions:  If any biopsies were performed, my office will call you in 5 to 6 business   days with any results.  You are being discharged to home.   We are waiting for your pathology results.   Telephone your endoscopist for pathology results in two weeks.   Return to your GI clinic at the next  available appointment.   For the next 1 weeks only - Consider avoiding all non-steroidal   anti-inflammatory drugs (aspirin, ibuprofen, naproxen, etc.), unless needed   for cardiovascular protection.  Recommend you discuss with your prescribing   doctor (of your aspirin) to see if cardiovascular benefits of your aspirin   outweigh the risks of GI bleeding.  The findings and recommendations have been discussed with you.   Resume taking Eliquis (apixaban) at your prior dose in 2 days.   Your physician has recommended a repeat upper endoscopy in one year for   surveillance.  For questions, problems or results please call your physician - Roberto Sotelo MD at Work:  [phone].  [facility], EMERGENCY ROOM PHONE NUMBER: [phone]  IF A COMPLICATION OR EMERGENCY SITUATION ARISES AND YOU ARE UNABLE TO REACH   YOUR PHYSICIAN - GO TO THE EMERGENCY ROOM.  Roberto Sotelo MD  7/26/2017 5:18:56 PM  This report has been verified and signed electronically.

## 2017-07-27 ENCOUNTER — TELEPHONE (OUTPATIENT)
Dept: GASTROENTEROLOGY | Facility: CLINIC | Age: 67
End: 2017-07-27

## 2017-07-27 DIAGNOSIS — I50.30 HEART FAILURE WITH PRESERVED LEFT VENTRICULAR FUNCTION (HFPEF): ICD-10-CM

## 2017-07-27 RX ORDER — FUROSEMIDE 80 MG/1
TABLET ORAL
Qty: 30 TABLET | Refills: 0 | Status: SHIPPED | OUTPATIENT
Start: 2017-07-27 | End: 2017-08-28 | Stop reason: SDUPTHER

## 2017-07-27 NOTE — TELEPHONE ENCOUNTER
----- Message from Roberto Sotelo MD sent at 7/26/2017  5:26 PM CDT -----  Ava - please tell Vinh that his EGD pathology showed a benign duodenum adenoma and recommend next EGD in one year.    Please tell him that his colon polyps were all benign but 10 of his 11 were precancerous adenomas and two of the 10 were advanced colon adenomas and recommend his next surveillance colonoscopy in 1-year.    SPECIMEN  1) Third portion of the duodenum, 2 mm.  2) Descending colon polyp, 2 mm.  3) Ascending colon polyp, 3 mm.  4) Cecal polyp, 1 mm.  5) Cecal polyp, 10 mm.  6) Ascending colon polyps x2, 4 mm and 2 mm.  7) Ascending colon polyp, 4 mm.  8) Transverse colon polyp x2; 3 mm, 4 mm.  9) Descending colon polyp, 8 mm.    FINAL PATHOLOGIC DIAGNOSIS    1 THIRD PORTION OF THE DUODENUM, 2 MM:  ADENOMATOUS POLYP.    2 DESCENDING COLON POLYP, 2 MM:  TUBULAR ADENOMA.    3 ASCENDING COLON POLYP, 3 MM:  TUBULAR ADENOMA.    4 CECAL POLYP, 1 MM:  COLONIC MUCOSA WITH FOCAL LYMPHOID AGGREGATE.    5 CECAL POLYP, 10 MM:  TUBULAR VILLOUS ADENOMA.    6 ASCENDING COLON POLYPS X2, 4 MM AND 2 MM:  FRAGMENTS OF TUBULAR ADENOMA.    7 ASCENDING COLON POLYP, 4 MM:  TUBULAR ADENOMA.    8 TRANSVERSE COLON POLYP X2; 3 MM, 4 MM:  2 FRAGMENTS OF TUBULAR ADENOMA.    9 DESCENDING COLON POLYP, 8 MM:  TUBULAR VILLOUS ADENOMA.  CAUTERIZED MARGIN FREE OF DYSPLASIA.    Diagnosed by: Loy Chau M.D.  (Electronically Signed: 2017-07-26 14:39:55)

## 2017-07-28 ENCOUNTER — TELEPHONE (OUTPATIENT)
Dept: ENDOSCOPY | Facility: HOSPITAL | Age: 67
End: 2017-07-28

## 2017-07-28 ENCOUNTER — TELEPHONE (OUTPATIENT)
Dept: HEPATOLOGY | Facility: CLINIC | Age: 67
End: 2017-07-28

## 2017-07-28 ENCOUNTER — TELEPHONE (OUTPATIENT)
Dept: ENDOCRINOLOGY | Facility: CLINIC | Age: 67
End: 2017-07-28

## 2017-07-28 DIAGNOSIS — K76.9 LIVER DISEASE: ICD-10-CM

## 2017-07-28 DIAGNOSIS — K74.69 OTHER CIRRHOSIS OF LIVER: Primary | ICD-10-CM

## 2017-07-28 NOTE — TELEPHONE ENCOUNTER
Spoke with patient follow up U/S,  Labs schedule  09/07 (in [place]), and clinic appt 09/ 14 @ 1040am. Pt address verified appt letter mailed.

## 2017-07-28 NOTE — TELEPHONE ENCOUNTER
----- Message from Noemi Putnam sent at 7/28/2017 10:20 AM CDT -----  Contact: Self / [phone]  PT wants to know if he can fax his bg readings instead of having to come to his appointment since he has to drive from [place].     He can be reached at [phone].

## 2017-07-28 NOTE — TELEPHONE ENCOUNTER
Called a pt regarding BG logs. Advise a pt that he can fax over our office and we can get it. Provide a fax no to the pt. Pt will fax over his BG logs.

## 2017-07-28 NOTE — TELEPHONE ENCOUNTER
----- Message from Lena Miner MA sent at 7/28/2017  1:31 PM CDT -----  Contact: patient       ----- Message -----  From: Kaila Perez  Sent: 7/28/2017   1:26 PM  To: [facility] Non-Clinical Staff    Patient calling to see if he needs to come in for his visit   [phone]

## 2017-08-02 ENCOUNTER — CLINICAL SUPPORT (OUTPATIENT)
Dept: ELECTROPHYSIOLOGY | Facility: CLINIC | Age: 67
End: 2017-08-02
Payer: MEDICARE

## 2017-08-02 DIAGNOSIS — Z95.818 STATUS POST PLACEMENT OF IMPLANTABLE LOOP RECORDER: ICD-10-CM

## 2017-08-02 DIAGNOSIS — R55 SYNCOPE: ICD-10-CM

## 2017-08-02 PROCEDURE — 93299 LOOP RECORDER REMOTE: CPT | Mod: PBBFAC | Performed by: INTERNAL MEDICINE

## 2017-08-02 PROCEDURE — 93297 REM INTERROG DEV EVAL ICPMS: CPT | Mod: ,,, | Performed by: INTERNAL MEDICINE

## 2017-08-04 ENCOUNTER — TELEPHONE (OUTPATIENT)
Dept: INTERNAL MEDICINE | Facility: CLINIC | Age: 67
End: 2017-08-04

## 2017-08-04 NOTE — TELEPHONE ENCOUNTER
Patient reports nausea, mild abdominal pain, soft stools x 2 days. Denies vomiting, fever, severe pain, dysuria. He is taking Zofran prn. I advised to avoid dairy and push clear liquids along with a bland diet. Follow-up up in clinic on Monday if symptoms not resolved. ER for worsening pain, fever, or persistent vomiting/diarrhea.

## 2017-08-04 NOTE — TELEPHONE ENCOUNTER
Spoke to patient and states he is nausea----patient had upper and lower GI in July----patient is wanting to know how often can he take the Zofran---bottle states every 8 hours---can he take sooner than 8 hours

## 2017-08-04 NOTE — TELEPHONE ENCOUNTER
Please call patient and let him know that it is fine to occasionally take it every 6 hours, however try to take only every 8 hours if possible.  I will send this message to Dr. Thapa so that she knows what is going on as well.

## 2017-08-04 NOTE — TELEPHONE ENCOUNTER
----- Message from Saud Pereira sent at 8/4/2017  2:20 PM CDT -----  Contact: Self/ [phone]   Type: Returning a call    Who left a message? Radha     When did the practice call? Today     Comments: pt stated he miss a call from the office. Please call and advise     Thank you

## 2017-08-04 NOTE — TELEPHONE ENCOUNTER
----- Message from Argelia Mcconnell sent at 2017  2:11 PM CDT -----  Contact: self  Vinh Castro  MRN: [id number]  : 1950  PCP: Natalee Thapa  Home Phone      [phone]  Work Phone      Not on file.  Mobile          [phone]      MESSAGE: question have heart burn want to know what he can do------[phone]

## 2017-08-04 NOTE — TELEPHONE ENCOUNTER
----- Message from Leslye Zheng sent at 8/4/2017  2:03 PM CDT -----  Contact: self [phone]  Patient would like a call back from the office to discuss having bad stomach pain , stated he already took nausea medication . Please call and advise , Thanks !

## 2017-08-08 ENCOUNTER — TELEPHONE (OUTPATIENT)
Dept: ENDOCRINOLOGY | Facility: CLINIC | Age: 67
End: 2017-08-08

## 2017-08-08 NOTE — TELEPHONE ENCOUNTER
Please call pt. Received logs, ending 7/25.   Appears recent changes made/reviewed by Dr. Pavon 8/2.   Continue with changes per Dr. Pavon.

## 2017-08-08 NOTE — TELEPHONE ENCOUNTER
Called and spoke with patient.  Explained details as per Raiza Garces NP - Patient verbalizes understanding.

## 2017-08-14 RX ORDER — POLYETHYLENE GLYCOL 3350 17 G/17G
POWDER, FOR SOLUTION ORAL
Qty: 527 G | Refills: 0 | Status: SHIPPED | OUTPATIENT
Start: 2017-08-14 | End: 2017-09-13 | Stop reason: SDUPTHER

## 2017-08-18 RX ORDER — BLOOD SUGAR DIAGNOSTIC
STRIP MISCELLANEOUS
Qty: 600 STRIP | Refills: 0 | Status: SHIPPED | OUTPATIENT
Start: 2017-08-18 | End: 2017-08-22 | Stop reason: SDUPTHER

## 2017-08-23 DIAGNOSIS — K86.81 EXOCRINE PANCREATIC INSUFFICIENCY: ICD-10-CM

## 2017-08-23 RX ORDER — PANCRELIPASE 24000; 76000; 120000 [USP'U]/1; [USP'U]/1; [USP'U]/1
CAPSULE, DELAYED RELEASE PELLETS ORAL
Qty: 210 CAPSULE | Refills: 0 | Status: SHIPPED | OUTPATIENT
Start: 2017-08-23 | End: 2017-09-28 | Stop reason: SDUPTHER

## 2017-08-24 ENCOUNTER — OFFICE VISIT (OUTPATIENT)
Dept: PHYSICAL MEDICINE AND REHAB | Facility: CLINIC | Age: 67
End: 2017-08-24
Payer: MEDICARE

## 2017-08-24 ENCOUNTER — TELEPHONE (OUTPATIENT)
Dept: ELECTROPHYSIOLOGY | Facility: CLINIC | Age: 67
End: 2017-08-24

## 2017-08-24 VITALS
SYSTOLIC BLOOD PRESSURE: 140 MMHG | WEIGHT: 251.75 LBS | BODY MASS INDEX: 38.15 KG/M2 | DIASTOLIC BLOOD PRESSURE: 54 MMHG | HEART RATE: 59 BPM | HEIGHT: 68 IN

## 2017-08-24 DIAGNOSIS — G89.29 CHRONIC LOW BACK PAIN WITH SCIATICA, SCIATICA LATERALITY UNSPECIFIED, UNSPECIFIED BACK PAIN LATERALITY: ICD-10-CM

## 2017-08-24 DIAGNOSIS — M54.16 LUMBAR RADICULOPATHY: Primary | ICD-10-CM

## 2017-08-24 DIAGNOSIS — M46.92 SPONDYLITIS, CERVICAL: ICD-10-CM

## 2017-08-24 DIAGNOSIS — G89.29 CHRONIC BILATERAL LOW BACK PAIN WITHOUT SCIATICA: ICD-10-CM

## 2017-08-24 DIAGNOSIS — G56.01 CARPAL TUNNEL SYNDROME OF RIGHT WRIST: ICD-10-CM

## 2017-08-24 DIAGNOSIS — M50.30 DDD (DEGENERATIVE DISC DISEASE), CERVICAL: ICD-10-CM

## 2017-08-24 DIAGNOSIS — M19.041 PRIMARY OSTEOARTHRITIS OF RIGHT HAND: ICD-10-CM

## 2017-08-24 DIAGNOSIS — M54.2 CHRONIC NECK PAIN: ICD-10-CM

## 2017-08-24 DIAGNOSIS — G89.29 CHRONIC NECK PAIN: ICD-10-CM

## 2017-08-24 DIAGNOSIS — E11.42 DIABETIC PERIPHERAL NEUROPATHY: ICD-10-CM

## 2017-08-24 DIAGNOSIS — M54.40 CHRONIC LOW BACK PAIN WITH SCIATICA, SCIATICA LATERALITY UNSPECIFIED, UNSPECIFIED BACK PAIN LATERALITY: ICD-10-CM

## 2017-08-24 DIAGNOSIS — M54.50 CHRONIC BILATERAL LOW BACK PAIN WITHOUT SCIATICA: ICD-10-CM

## 2017-08-24 PROCEDURE — 3045F PR MOST RECENT HEMOGLOBIN A1C LEVEL 7.0-9.0%: CPT | Mod: ,,, | Performed by: PHYSICAL MEDICINE & REHABILITATION

## 2017-08-24 PROCEDURE — 99999 PR PBB SHADOW E&M-EST. PATIENT-LVL III: CPT | Mod: PBBFAC,,, | Performed by: PHYSICAL MEDICINE & REHABILITATION

## 2017-08-24 PROCEDURE — 3077F SYST BP >= 140 MM HG: CPT | Mod: ,,, | Performed by: PHYSICAL MEDICINE & REHABILITATION

## 2017-08-24 PROCEDURE — 99213 OFFICE O/P EST LOW 20 MIN: CPT | Mod: PBBFAC | Performed by: PHYSICAL MEDICINE & REHABILITATION

## 2017-08-24 PROCEDURE — 99214 OFFICE O/P EST MOD 30 MIN: CPT | Mod: S$PBB,,, | Performed by: PHYSICAL MEDICINE & REHABILITATION

## 2017-08-24 PROCEDURE — 3066F NEPHROPATHY DOC TX: CPT | Mod: ,,, | Performed by: PHYSICAL MEDICINE & REHABILITATION

## 2017-08-24 PROCEDURE — 1159F MED LIST DOCD IN RCRD: CPT | Mod: ,,, | Performed by: PHYSICAL MEDICINE & REHABILITATION

## 2017-08-24 PROCEDURE — 1125F AMNT PAIN NOTED PAIN PRSNT: CPT | Mod: ,,, | Performed by: PHYSICAL MEDICINE & REHABILITATION

## 2017-08-24 PROCEDURE — 3078F DIAST BP <80 MM HG: CPT | Mod: ,,, | Performed by: PHYSICAL MEDICINE & REHABILITATION

## 2017-08-24 RX ORDER — HYDROCODONE BITARTRATE AND ACETAMINOPHEN 10; 325 MG/1; MG/1
1 TABLET ORAL EVERY 4 HOURS PRN
Qty: 150 TABLET | Refills: 0 | Status: SHIPPED | OUTPATIENT
Start: 2017-08-24 | End: 2017-09-22 | Stop reason: SDUPTHER

## 2017-08-24 NOTE — TELEPHONE ENCOUNTER
----- Message from Derrick Galloway MD sent at 8/24/2017  3:35 PM CDT -----  Contact: Pt called  Yes no problem      ----- Message -----  From: Jessica Thakkar RN  Sent: 8/24/2017   2:32 PM  To: Derrick Galloway MD    Can Pt stop Eliquis for 7 days for lumbar steroid injection??  ----- Message -----  From: Na Don  Sent: 8/24/2017  12:07 PM  To: Jessica Thakkar RN    Pt called, states he is needing to discuss discontinuing RX Eliquis in order to obtain an injection shot. He states he is needing a call today, so he may schedule appointment. Ph [phone]. Dr. Gaviria's office will give pt the injection. Thank you

## 2017-08-24 NOTE — PROGRESS NOTES
""This note will not be shared with the patient."Subjective:       Patient ID: Vinh Castro is a 67 y.o. male.    Chief Complaint: Back Pain and Sciatica    Back Pain   Associated symptoms include leg pain. Pertinent negatives include no abdominal pain, chest pain, fever or headaches.   Hip Pain      Hand Pain    Pertinent negatives include no chest pain.   Neck Pain    Associated symptoms include leg pain. Pertinent negatives include no chest pain, fever, headaches or trouble swallowing.   Leg Pain      Shoulder Pain    Pertinent negatives include no fever or headaches.      Subjective:       Patient ID: Vinh Castro is a 67 y.o. male.    Chief Complaint: Back Pain and Sciatica  Mr. Castro returns  to clinic for  acute worsening of chronic back and leg pain.    V 04/18/17.    #1 Back pain.  Current low back pain is 10, worst pain 10/10.   Back pain is localized in Rt buttock, and runs down the RT leg for 2 days.  Wife came with him, and she reports that patient was working hard in yard, and was picking heavy things few days before the pain started.  Before that he felt better, and he was in his usual state of health.  Rt buttock  pain runs across the lower back on left side, and down to Rt leg on side, and back of thigh bellow the knee, at outside aspect of leg,   but not to ankle/ foot.   Pain in Rt buttock, he describes as disabling, and intense sharp, shooting pain down the leg, and also as a  burning pain.   He cannot to bend leg , it hurts to wear a shoes, he does that in sitting position, but he cannot bend Rt leg to place a shoe on foot.  He has diabetic polyneuropathy.  He reports back and Rt leg pain being constant,shooting and  burning pain.   Sitting, makes pain worse, lying down improves the pain.  Also he has a pain with walking, able to walk  ~ 1 blocks.  No true weakness in legs, no numbness, paraesthesias.  No BB incontinence., no saddle anesthesia.  He is interested in epidural injection to his " back.  He takes Gabapentin 600 mg po QID, that is helping   Tolerates well Hydrocodone,and wants to continue the same medications.   He completed Physical therapy, and now doing exercises at home.   Here for follow up, for  acute worsening of chronic back and leg pain.      Past Medical History:   Diagnosis Date    *Atrial fibrillation     Acquired hypothyroidism 5/4/2016    Allergy     Anemia     Anticoagulant long-term use     Arthritis     CAD (coronary artery disease)     CABG 2002, PCI    CHF (congestive heart failure)     Chronic anticoagulation     Cirrhosis of liver 01/08/2017    Coronary artery disease     Depression     Diabetic nephropathy     Diabetic retinopathy associated with type 2 diabetes mellitus     Diverticulosis     Glaucoma     patient denies having glaucoma    History of BPH     History of peripheral vascular disease     Bilateral Carotid Artery Stenosis < 40% on Carotid US 7/2012    Hyperlipidemia     Hypertension     Obesity     SEAN (obstructive sleep apnea)     uses C-PAP    Pancreatic insufficiency     Personal history of kidney stones     PN (peripheral neuropathy)     Syncope 5/23/2016    Type 2 diabetes mellitus not at goal        Past Surgical History:   Procedure Laterality Date    ABDOMINAL SURGERY      CATARACT EXTRACTION BILATERAL W/ ANTERIOR VITRECTOMY      COLONOSCOPY      COLONOSCOPY N/A 7/21/2017    Procedure: COLONOSCOPY;  Surgeon: Roberto Sotelo MD;  Location: [facility] ([address] [place] [address]);  Service: Endoscopy;  Laterality: N/A;  pt requesting ASAP. hx of cirrhosis-labs ordered prior       Per protocol, pt does not have history of stroke/TIA. Ok to hold eliquis x 48 hours prior to procedure-per Sonya Quezada RN-(arrythmia clinic)         CORONARY ANGIOPLASTY  2014    Patent LIMA to LAD, SVG to OM, occluded LAD, 60% Circ-x1cv stent placed    CORONARY ARTERY BYPASS GRAFT  2001    iyer LAD SVG OM1    EYE SURGERY      retina repair    GALLBLADDER  SURGERY      HAND SURGERY      LOST THREE FINGERS/left 1979    HERNIA REPAIR      JOINT REPLACEMENT      RADIOFREQUENCY ABLATION  8/14    atrial flutter    RETINAL LASER PROCEDURE      SKIN BIOPSY      head    TONSILLECTOMY      UPPER GASTROINTESTINAL ENDOSCOPY         Family History   Problem Relation Age of Onset    Cancer Mother      Rhumatoid lung disease    Diabetes Father     Heart attack Father     Heart disease Father     Cancer Father      Lymphona    Obesity Sister     Diabetes Paternal Grandfather     Anesthesia problems Neg Hx     Colon cancer Neg Hx     Esophageal cancer Neg Hx        Social History     Social History    Marital status:      Spouse name: Sandra    Number of children: 1    Years of education: N/A     Social History Main Topics    Smoking status: Never Smoker    Smokeless tobacco: Never Used    Alcohol use No    Drug use: No    Sexual activity: No     Other Topics Concern    None     Social History Narrative    Work: Manager at [employer]ical activity: None.Hobbies: Fishing.       Current Outpatient Prescriptions   Medication Sig Dispense Refill     insulin regular U-500 concentrated (HUMULIN R U-500, CONCENTRATED,) 500 unit/mL Soln 32-33 unit edgar 30 minutes before meals and night snack (Patient taking differently: Inject 34 Units into the skin. 34 unit edgar 30 minutes before meals  3x/day) 60 mL 5    alprazolam (XANAX) 0.5 MG tablet Take 1 tablet (0.5 mg total) by mouth daily as needed. 30 tablet 0    aspirin 81 MG Chew Take 81 mg by mouth once daily.      betamethasone dipropionate (DIPROLENE) 0.05 % ointment       blood sugar diagnostic (ACCU-CHEK JANEY PLUS TEST STRP) Strp Patient to check blood sugar 6 x's per day--Dx Code 250.62,  357.2 600 each 1    blood sugar diagnostic (ACCU-CHEK JANEY PLUS TEST STRP) Strp Apply 1 strip topically 4 (four) times daily. 600 strip 0    calcium carbonate-simethicone (MAALOX ADVANCED) 1,000-60 mg Chew Take 2  "tablets by mouth 3 (three) times daily as needed.      ciprofloxacin HCl (CILOXAN) 0.3 % ophthalmic solution       CREON 24,000-76,000 -120,000 unit capsule TAKE 2 CAPSULES BY MOUTH THREE TIMES DAILY WITH MEALS AND 1 CAPSULE WITH A SNACK 210 capsule 0    DEXTROSE (GLUCOSE) Chew Take by mouth as needed.      diclofenac sodium (VOLTAREN) 1 % Gel Apply 2 g topically 4 (four) times daily. Apply to both hand 3 Tube 2    ELIQUIS 5 mg Tab TAKE 1 TABLET BY MOUTH TWICE DAILY 60 tablet 5    ferrous sulfate 325 mg (65 mg iron) Tab tablet Take 1 tablet (325 mg total) by mouth once daily. 90 tablet 1    fexofenadine (ALLEGRA) 180 MG tablet Take 180 mg by mouth once daily.      fluocinolone-shower cap 0.01 % Oil       fluticasone (FLONASE) 50 mcg/actuation nasal spray SHAKE WELL AND USE 2 SPRAYS IN EACH NOSTRIL EVERY DAY 1 Bottle 5    furosemide (LASIX) 80 MG tablet TAKE 1 TABLET BY MOUTH EVERY DAY 30 tablet 0    gabapentin (NEURONTIN) 600 MG tablet Take 1 tablet (600 mg total) by mouth 4 (four) times daily with meals and nightly. 360 tablet 1    guaifenesin (MUCINEX) 600 mg 12 hr tablet Take 600 mg by mouth continuous prn for Congestion. Prn        hydrocodone-acetaminophen 10-325mg (NORCO)  mg Tab Take 1 tablet by mouth every 4 (four) hours as needed for Pain. 150 tablet 0    hydrOXYzine pamoate (VISTARIL) 25 MG Cap Take 1 capsule (25 mg total) by mouth every 8 (eight) hours as needed. 20 capsule 0    insulin needles, disposable, (BD ULTRA-FINE REJI PEN NEEDLES) 32 x 5/32 " Ndle Uses 5 daily, on multiple daily insulin injections 150 each 1    insulin syringe-needle U-100 1/2 mL 30 gauge Syrg       insulin syringe-needle U-100 1/2 mL 30 gauge x 5/16 Syrg USE WITH INSULIN 3 THREE TIMES DAILY TO FOUR TIMES DAILY 400 each 0    insulin syringe-needle U-100 1/2 mL 30 Syrg       isosorbide mononitrate (IMDUR) 30 MG 24 hr tablet TAKE ONE TABLET BY MOUTH ONCE DAILY 30 tablet 5    KRILL/OM3/DHA/EPA/OM6/LIP/ASTX " (KRILL OIL, OMEGA 3 & 6, ORAL) Take 1 tablet by mouth every evening.       lancets (BD ULTRA FINE LANCETS) Misc 1 Units by Misc.(Non-Drug; Combo Route) route 4 (four) times daily. 200 each 11    levothyroxine (SYNTHROID) 25 MCG tablet TAKE 1 TABLET(25 MCG) BY MOUTH EVERY DAY 30 tablet 5    losartan (COZAAR) 50 MG tablet TAKE 1 TABLET BY MOUTH EVERY DAY 90 tablet 3    metolazone (ZAROXOLYN) 5 MG tablet Take 1 tablet (5 mg total) by mouth daily as needed (Weight gain of 5 pounds or more). 30 tablet 6    metoprolol tartrate (LOPRESSOR) 50 MG tablet Take 1 tablet (50 mg total) by mouth 2 (two) times daily. 60 tablet 11    MV-MN/FA/LYCOPENE/LUT/HB#178 (RAMEZ MULTIVITAMIN FOR MEN ORAL) Take by mouth.      NEVANAC 0.1 % ophthalmic suspension       nitroGLYCERIN (NITROSTAT) 0.4 MG SL tablet Place 1 tablet (0.4 mg total) under the tongue every 5 (five) minutes as needed. 25 tablet 6    ofloxacin (FLOXIN) 0.3 % otic solution Place 3 drops into both ears 2 (two) times daily.       ondansetron (ZOFRAN) 4 MG tablet Take 1 tablet (4 mg total) by mouth every 8 (eight) hours as needed for Nausea. 30 tablet 0    oxymetazoline (MUCINEX SINUS-MAX) 0.05 % nasal spray 2 sprays by Nasal route once daily.      pantoprazole (PROTONIX) 40 MG tablet TAKE 1 TABLET(40 MG) BY MOUTH TWICE DAILY 60 tablet 3    polyethylene glycol (GLYCOLAX) 17 gram/dose powder MIX AND DRINK 17 GRAMS BY MOUTH EVERY  g 0    simethicone (MYLICON) 125 MG chewable tablet Take 125 mg by mouth every 6 (six) hours as needed for Flatulence.      tamsulosin (FLOMAX) 0.4 mg Cp24 TAKE 1 CAPSULE BY MOUTH EVERY DAY 90 capsule 5    triamcinolone (NASACORT) 55 mcg nasal inhaler 2 sprays by Nasal route once daily.      zolpidem (AMBIEN) 5 MG Tab TAKE 1 TABLET BY MOUTH EVERY EVENING 30 tablet 0     No current facility-administered medications for this visit.        Review of patient's allergies indicates:   Allergen Reactions    Cephalexin Hives and  Shortness Of Breath    Penicillins Other (See Comments)     Unknown  Hardened skin    Pravastatin Other (See Comments)     myalgia    Sulfa (sulfonamide antibiotics)      Other reaction(s): Unknown    Adhesive Rash           Review of Systems   Constitutional: Negative for activity change, appetite change, chills, diaphoresis, fatigue, fever and unexpected weight change.   HENT: Negative for trouble swallowing and voice change.    Eyes: Negative for pain and visual disturbance.   Respiratory: Negative for chest tightness, shortness of breath and wheezing.    Cardiovascular: Negative for chest pain, palpitations and leg swelling.   Gastrointestinal: Negative for abdominal pain, constipation and diarrhea.   Genitourinary: Negative for difficulty urinating, frequency and urgency.   Musculoskeletal: Positive for back pain and neck pain. Negative for arthralgias, joint swelling, myalgias and neck stiffness.   Skin: Negative for rash and wound.   Neurological: Negative for dizziness, facial asymmetry, speech difficulty, light-headedness and headaches.   Hematological: Negative for adenopathy.   Psychiatric/Behavioral: Negative for agitation, behavioral problems, confusion, decreased concentration, dysphoric mood and sleep disturbance.         Objective:      Physical Exam    GENERAL: The patient is alert, oriented, pleasant, obese.   MUSCULOSKELETAL:   Gait is antalgic, walks without cane, on wide basis, small steps, improved balance.  Very careful gait, with minimal limping of Rt leg.  Cervical spine full range of motion in all planes.   Lumbar spine, decreased active range of motion in all planes, flexion to 90 degrees, with shooting, reproducible pain in RT leg ( with bending forward).  Side bending and rotation to 20-25 degrees bilateral, with pain on bending to the Right.  Positive facet loading bilateral..  Straight leg raising positive bilaterally.   Full range of motion in all joints x4 extremities.   Muscle  strength 5/5 throughout x4 extremities.   No  joint laxity throughout x4 extremities.   Rt  hand  incomplete, weak, can bend all fingers but pinky and ring finger flexion is delayed.  NEUROLOGIC: Cranial nerves II through XII intact.   Deep tendon reflexes is normal, +2 in the upper and lower extremities bilaterally.   Muscle tone is normal.   Sensory is intact to light touch and pinprick throughout x 4 extremities.     Xray of Lumbar spine.   Lumbar spine demonstrates mild to moderate DJD. Marginal osteophytes seen at multiple levels.   Facet hypertrophy seen at the lower levels. Vertebral body heights are fairly well maintained.   Flexion-extension views show no evidence of instability     MRI of Lumbar spine showed ( 10/23/14):   L4-5: Circumferential disk bulge, bilateral facet arthropathy, and ligamentum flavum hypertrophy results in moderate bilateral neural foraminal narrowing    and no significant spinal canal narrowing.  L5-S1: Severe bilateral facet arthropathy and ligament flavum hypertrophy resulting in significant spinal canal or neural foraminal narrowing.   Impression:  Multilevel lumbar spondylosis as detailed above resulting in moderate bilateral neural foraminal narrowing at L4-5 and mild bilateral neural foraminal narrowing at L3-4.    Assessment:           1. Lumbar radiculopathy    2. Chronic low back pain with sciatica, sciatica laterality unspecified, unspecified back pain laterality    3. Diabetic peripheral neuropathy    4. Chronic neck pain    5. Chronic bilateral low back pain without sciatica    6. Carpal tunnel syndrome of right wrist    7. DDD (degenerative disc disease), cervical    8. Spondylitis, cervical    9. Primary osteoarthritis of right hand        Plan:       Lumbar radiculopathy  -     hydrocodone-acetaminophen 10-325mg (NORCO)  mg Tab; Take 1 tablet by mouth every 4 (four) hours as needed for Pain.  Dispense: 150 tablet; Refill: 0  -     IR Epidural Transfor 1st  Vert Lumbar Fei; Future    Chronic low back pain with sciatica, sciatica laterality unspecified, unspecified back pain laterality  -     hydrocodone-acetaminophen 10-325mg (NORCO)  mg Tab; Take 1 tablet by mouth every 4 (four) hours as needed for Pain.  Dispense: 150 tablet; Refill: 0  -     IR Epidural Transfor 1st Vert Lumbar Fei; Future    Diabetic peripheral neuropathy  -     hydrocodone-acetaminophen 10-325mg (NORCO)  mg Tab; Take 1 tablet by mouth every 4 (four) hours as needed for Pain.  Dispense: 150 tablet; Refill: 0  -     IR Epidural Transfor 1st Vert Lumbar Fei; Future    Chronic neck pain  -     hydrocodone-acetaminophen 10-325mg (NORCO)  mg Tab; Take 1 tablet by mouth every 4 (four) hours as needed for Pain.  Dispense: 150 tablet; Refill: 0  -     IR Epidural Transfor 1st Vert Lumbar Fei; Future    Chronic bilateral low back pain without sciatica  -     hydrocodone-acetaminophen 10-325mg (NORCO)  mg Tab; Take 1 tablet by mouth every 4 (four) hours as needed for Pain.  Dispense: 150 tablet; Refill: 0  -     IR Epidural Transfor 1st Vert Lumbar Fei; Future    Carpal tunnel syndrome of right wrist  -     hydrocodone-acetaminophen 10-325mg (NORCO)  mg Tab; Take 1 tablet by mouth every 4 (four) hours as needed for Pain.  Dispense: 150 tablet; Refill: 0  -     IR Epidural Transfor 1st Vert Lumbar Fei; Future    DDD (degenerative disc disease), cervical  -     hydrocodone-acetaminophen 10-325mg (NORCO)  mg Tab; Take 1 tablet by mouth every 4 (four) hours as needed for Pain.  Dispense: 150 tablet; Refill: 0  -     IR Epidural Transfor 1st Vert Lumbar Fei; Future    Spondylitis, cervical  -     hydrocodone-acetaminophen 10-325mg (NORCO)  mg Tab; Take 1 tablet by mouth every 4 (four) hours as needed for Pain.  Dispense: 150 tablet; Refill: 0  -     IR Epidural Transfor 1st Vert Lumbar Fei; Future    Primary osteoarthritis of right hand  -     hydrocodone-acetaminophen  10-325mg (NORCO)  mg Tab; Take 1 tablet by mouth every 4 (four) hours as needed for Pain.  Dispense: 150 tablet; Refill: 0  -     IR Epidural Transfor 1st Vert Lumbar Fei; Future    Patient with worsening of chronic back pain, secondary to lumbar spondylosis, facet arthropathy, and possible Rt L5-S1 lumbar radiculopathy.  I had a prolong discussion with patient and his wife, about possible causes of his pain.     1. Discussed the back and Rt leg pain, its course and treatment.   Questions answered.      2. Diagnostics/Imaging:   Xrays of lumbar spine and MRI of lumbar spine results ( 2014) discussed again in details with patient and his wife.  Suggested to repeat MRI of LS, but pt defers at this time.  He states that pain is intense, and he is more interested in quick resolution of pain.    3. Pain management:   Hydrocodone 10/325 mg po q6 hrs prn pain, #150, with no refills.  Will resume  Neurontin 600 mg po tid- qid if tolerated ( one in am / noon/ at 2 at bedtime).    4. Refer to for B/l L5-S1 TF ISABEL by IR, if OK with Cardiology to stay off Eliquis for 7 days prior to injection.  Patient will call his Cardiologist to discuss.       Follow up in 4 weeks.     Total time spent face to face with patient was 25 minutes.   More than 50% of that time was spent in counseling on diagnosis , prognosis and treatment options.   Discussed ISABEL, need that he stays of anticoagulant therapy for 7 days, prior to procedure.  I also caunsel patient  on common and most usual side effect of prescribed medications.   Risk and benefits of opiates, possible risk of developing opiate dependence and tolerance, need of strict compliance with prescribed medications.  I reviewed Primary care , and other specialty's notes to better coordinate patient's  care.   All questions were answered, and patient voiced understanding.

## 2017-08-25 ENCOUNTER — PATIENT MESSAGE (OUTPATIENT)
Dept: ENDOCRINOLOGY | Facility: CLINIC | Age: 67
End: 2017-08-25

## 2017-08-25 RX ORDER — DEXTROSE 4 G
TABLET,CHEWABLE ORAL
Qty: 1 EACH | Refills: 0 | Status: SHIPPED | OUTPATIENT
Start: 2017-08-25 | End: 2017-10-05

## 2017-08-25 NOTE — TELEPHONE ENCOUNTER
----- Message from Ida Mccabe sent at 8/25/2017  2:34 PM CDT -----  Contact: pt [phone]  Pt called requesting for dr Garces to call in a new Accucheck Brand JANEY meter, he broke his.  Please call this in to the [id number] - [facility] - [address] AT [facility]  [phone]    Please call pt if you have any questions at [phone]  [employer]  Thanks  veto

## 2017-08-28 DIAGNOSIS — I50.30 HEART FAILURE WITH PRESERVED LEFT VENTRICULAR FUNCTION (HFPEF): ICD-10-CM

## 2017-08-29 RX ORDER — FUROSEMIDE 80 MG/1
TABLET ORAL
Qty: 30 TABLET | Refills: 1 | Status: SHIPPED | OUTPATIENT
Start: 2017-08-29 | End: 2017-11-06 | Stop reason: SDUPTHER

## 2017-09-02 DIAGNOSIS — E11.42 DIABETIC PERIPHERAL NEUROPATHY: ICD-10-CM

## 2017-09-02 DIAGNOSIS — M19.041 PRIMARY OSTEOARTHRITIS OF RIGHT HAND: ICD-10-CM

## 2017-09-02 DIAGNOSIS — G89.29 CHRONIC NECK PAIN: ICD-10-CM

## 2017-09-02 DIAGNOSIS — M54.40 CHRONIC LOW BACK PAIN WITH SCIATICA, SCIATICA LATERALITY UNSPECIFIED, UNSPECIFIED BACK PAIN LATERALITY: ICD-10-CM

## 2017-09-02 DIAGNOSIS — M54.2 CHRONIC NECK PAIN: ICD-10-CM

## 2017-09-02 DIAGNOSIS — M50.30 DDD (DEGENERATIVE DISC DISEASE), CERVICAL: ICD-10-CM

## 2017-09-02 DIAGNOSIS — M46.92 SPONDYLITIS, CERVICAL: ICD-10-CM

## 2017-09-02 DIAGNOSIS — G89.29 CHRONIC LOW BACK PAIN WITH SCIATICA, SCIATICA LATERALITY UNSPECIFIED, UNSPECIFIED BACK PAIN LATERALITY: ICD-10-CM

## 2017-09-05 ENCOUNTER — CLINICAL SUPPORT (OUTPATIENT)
Dept: ELECTROPHYSIOLOGY | Facility: CLINIC | Age: 67
End: 2017-09-05
Payer: MEDICARE

## 2017-09-05 DIAGNOSIS — R55 SYNCOPE: ICD-10-CM

## 2017-09-05 DIAGNOSIS — Z95.818 STATUS POST PLACEMENT OF IMPLANTABLE LOOP RECORDER: ICD-10-CM

## 2017-09-05 PROCEDURE — 93299 LOOP RECORDER REMOTE: CPT | Mod: PBBFAC | Performed by: INTERNAL MEDICINE

## 2017-09-05 PROCEDURE — 93297 REM INTERROG DEV EVAL ICPMS: CPT | Mod: ,,, | Performed by: INTERNAL MEDICINE

## 2017-09-05 RX ORDER — GABAPENTIN 600 MG/1
TABLET ORAL
Qty: 360 TABLET | Refills: 0 | Status: SHIPPED | OUTPATIENT
Start: 2017-09-05 | End: 2018-06-11 | Stop reason: SDUPTHER

## 2017-09-13 ENCOUNTER — TELEPHONE (OUTPATIENT)
Dept: HEPATOLOGY | Facility: CLINIC | Age: 67
End: 2017-09-13

## 2017-09-13 RX ORDER — POLYETHYLENE GLYCOL 3350 17 G/17G
POWDER, FOR SOLUTION ORAL
Qty: 527 G | Refills: 0 | Status: SHIPPED | OUTPATIENT
Start: 2017-09-13 | End: 2017-10-10 | Stop reason: SDUPTHER

## 2017-09-13 NOTE — TELEPHONE ENCOUNTER
----- Message from Ethel Cooper sent at 9/13/2017 12:20 PM CDT -----  Contact: Pt can be reached at [phone]  Pt is requesting to reschedule his appt to Sept 22 when he has to come in town for another appt please. Pt has taken all labs.      Please be so kind to assist in this matter.      Thank you!

## 2017-09-13 NOTE — TELEPHONE ENCOUNTER
MA called patient back, patient is begging if we can reschedule his appt on 9/22 he said something happen with his ride and he could not make it so he is trying to reschedule his appt on 9/22 offer him our urgent spot at 9:20 patient accepted the appt. AYDE

## 2017-09-14 ENCOUNTER — TELEPHONE (OUTPATIENT)
Dept: INTERNAL MEDICINE | Facility: CLINIC | Age: 67
End: 2017-09-14

## 2017-09-14 NOTE — TELEPHONE ENCOUNTER
----- Message from Kristi Larsen sent at 9/14/2017  9:05 AM CDT -----  Contact: self/[phone]  Patient called in regards needing to talk with Dr Thapa medical assistant about find out who he will be contacting after Dr. Thapa retired, and if she knows or recommend a Dr from  [place] .   Please call and advise.       Thank you!!!

## 2017-09-15 ENCOUNTER — TELEPHONE (OUTPATIENT)
Dept: INTERNAL MEDICINE | Facility: CLINIC | Age: 67
End: 2017-09-15

## 2017-09-15 DIAGNOSIS — I15.9 SECONDARY HYPERTENSION: ICD-10-CM

## 2017-09-15 DIAGNOSIS — I50.30 HEART FAILURE WITH PRESERVED LEFT VENTRICULAR FUNCTION (HFPEF): ICD-10-CM

## 2017-09-15 NOTE — TELEPHONE ENCOUNTER
----- Message from Bartolo Melendez sent at 9/15/2017 12:20 PM CDT -----  Contact: Pt at [phone]  Patient would like to get medical advice.  Symptoms (please be specific):  cuts/sores on left leg  How long has patient had these symptoms:  Over 10 days  Pharmacy name and phone #:     [id number] - [place], [place] - [address] AT [address] of [facility] \  [phone] (Phone)  [phone] (Fax)      Any drug allergies:  See chart  Comments:

## 2017-09-15 NOTE — TELEPHONE ENCOUNTER
Spoke to patient and patient states that he would like to Dr. Thapa to call him and asked if Dr. Thapa could squeeze him in on Friday 9/22/2017. Pt would also like Dr. Thapa to recommend a physician in his area.

## 2017-09-18 ENCOUNTER — TELEPHONE (OUTPATIENT)
Dept: PHYSICAL MEDICINE AND REHAB | Facility: CLINIC | Age: 67
End: 2017-09-18

## 2017-09-18 RX ORDER — METOPROLOL TARTRATE 50 MG/1
50 TABLET ORAL 2 TIMES DAILY
Qty: 60 TABLET | Refills: 11 | Status: SHIPPED | OUTPATIENT
Start: 2017-09-18 | End: 2018-08-26 | Stop reason: SDUPTHER

## 2017-09-18 NOTE — TELEPHONE ENCOUNTER
I will not be in the office on Friday and only working very limited hours. Please schedule an urgent care appointment for him with another provider.

## 2017-09-18 NOTE — TELEPHONE ENCOUNTER
----- Message from Hayes Handy sent at 9/13/2017  2:16 PM CDT -----  Contact: Self @ [phone]  Pt is asking to move appt on 09/22/17 at 3:20 PM to a morning appt close to his appt @ 9:20 AM with Daisha Nielson. I checked the doctor's schedule, but he was fully committed. Pls call if pt can be seen earlier.

## 2017-09-19 ENCOUNTER — TELEPHONE (OUTPATIENT)
Dept: ELECTROPHYSIOLOGY | Facility: CLINIC | Age: 67
End: 2017-09-19

## 2017-09-22 ENCOUNTER — TELEPHONE (OUTPATIENT)
Dept: HEPATOLOGY | Facility: CLINIC | Age: 67
End: 2017-09-22

## 2017-09-22 ENCOUNTER — LAB VISIT (OUTPATIENT)
Dept: LAB | Facility: HOSPITAL | Age: 67
End: 2017-09-22
Payer: MEDICARE

## 2017-09-22 ENCOUNTER — OFFICE VISIT (OUTPATIENT)
Dept: HEPATOLOGY | Facility: CLINIC | Age: 67
End: 2017-09-22
Payer: MEDICARE

## 2017-09-22 ENCOUNTER — OFFICE VISIT (OUTPATIENT)
Dept: PHYSICAL MEDICINE AND REHAB | Facility: CLINIC | Age: 67
End: 2017-09-22
Payer: MEDICARE

## 2017-09-22 VITALS
WEIGHT: 250.88 LBS | OXYGEN SATURATION: 96 % | HEIGHT: 68 IN | TEMPERATURE: 98 F | RESPIRATION RATE: 18 BRPM | DIASTOLIC BLOOD PRESSURE: 64 MMHG | BODY MASS INDEX: 38.02 KG/M2 | SYSTOLIC BLOOD PRESSURE: 150 MMHG | HEART RATE: 60 BPM

## 2017-09-22 VITALS
HEIGHT: 68 IN | WEIGHT: 251.56 LBS | SYSTOLIC BLOOD PRESSURE: 133 MMHG | HEART RATE: 54 BPM | DIASTOLIC BLOOD PRESSURE: 55 MMHG | BODY MASS INDEX: 38.13 KG/M2

## 2017-09-22 DIAGNOSIS — K76.6 PORTAL HYPERTENSIVE GASTROPATHY: ICD-10-CM

## 2017-09-22 DIAGNOSIS — K74.69 OTHER CIRRHOSIS OF LIVER: ICD-10-CM

## 2017-09-22 DIAGNOSIS — M19.041 PRIMARY OSTEOARTHRITIS OF RIGHT HAND: ICD-10-CM

## 2017-09-22 DIAGNOSIS — K74.60 LIVER CIRRHOSIS SECONDARY TO NASH: Primary | ICD-10-CM

## 2017-09-22 DIAGNOSIS — M54.2 CHRONIC NECK PAIN: ICD-10-CM

## 2017-09-22 DIAGNOSIS — M54.50 CHRONIC BILATERAL LOW BACK PAIN WITHOUT SCIATICA: ICD-10-CM

## 2017-09-22 DIAGNOSIS — G89.29 CHRONIC NECK PAIN: ICD-10-CM

## 2017-09-22 DIAGNOSIS — G89.29 CHRONIC LOW BACK PAIN WITH SCIATICA, SCIATICA LATERALITY UNSPECIFIED, UNSPECIFIED BACK PAIN LATERALITY: Primary | ICD-10-CM

## 2017-09-22 DIAGNOSIS — K31.89 PORTAL HYPERTENSIVE GASTROPATHY: ICD-10-CM

## 2017-09-22 DIAGNOSIS — M50.30 DDD (DEGENERATIVE DISC DISEASE), CERVICAL: ICD-10-CM

## 2017-09-22 DIAGNOSIS — M54.40 CHRONIC LOW BACK PAIN WITH SCIATICA, SCIATICA LATERALITY UNSPECIFIED, UNSPECIFIED BACK PAIN LATERALITY: Primary | ICD-10-CM

## 2017-09-22 DIAGNOSIS — G56.01 CARPAL TUNNEL SYNDROME OF RIGHT WRIST: ICD-10-CM

## 2017-09-22 DIAGNOSIS — E11.42 DIABETIC PERIPHERAL NEUROPATHY: ICD-10-CM

## 2017-09-22 DIAGNOSIS — K76.9 LIVER DISEASE: ICD-10-CM

## 2017-09-22 DIAGNOSIS — R79.89 ELEVATED LFTS: ICD-10-CM

## 2017-09-22 DIAGNOSIS — G89.29 CHRONIC BILATERAL LOW BACK PAIN WITHOUT SCIATICA: ICD-10-CM

## 2017-09-22 DIAGNOSIS — M46.92 SPONDYLITIS, CERVICAL: ICD-10-CM

## 2017-09-22 DIAGNOSIS — M54.16 LUMBAR RADICULOPATHY: ICD-10-CM

## 2017-09-22 DIAGNOSIS — K75.81 LIVER CIRRHOSIS SECONDARY TO NASH: Primary | ICD-10-CM

## 2017-09-22 LAB
ALBUMIN SERPL BCP-MCNC: 3.4 G/DL
ALP SERPL-CCNC: 112 U/L
ALT SERPL W/O P-5'-P-CCNC: 42 U/L
ANION GAP SERPL CALC-SCNC: 9 MMOL/L
AST SERPL-CCNC: 35 U/L
BILIRUB SERPL-MCNC: 0.3 MG/DL
BUN SERPL-MCNC: 35 MG/DL
CALCIUM SERPL-MCNC: 9.4 MG/DL
CHLORIDE SERPL-SCNC: 100 MMOL/L
CO2 SERPL-SCNC: 28 MMOL/L
CREAT SERPL-MCNC: 1.8 MG/DL
EST. GFR  (AFRICAN AMERICAN): 43.7 ML/MIN/1.73 M^2
EST. GFR  (NON AFRICAN AMERICAN): 37.8 ML/MIN/1.73 M^2
GLUCOSE SERPL-MCNC: 196 MG/DL
INR PPP: 1.1
POTASSIUM SERPL-SCNC: 5.3 MMOL/L
PROT SERPL-MCNC: 7.5 G/DL
PROTHROMBIN TIME: 11.2 SEC
SODIUM SERPL-SCNC: 137 MMOL/L

## 2017-09-22 PROCEDURE — 99213 OFFICE O/P EST LOW 20 MIN: CPT | Mod: S$PBB,,, | Performed by: NURSE PRACTITIONER

## 2017-09-22 PROCEDURE — 99215 OFFICE O/P EST HI 40 MIN: CPT | Mod: PBBFAC | Performed by: NURSE PRACTITIONER

## 2017-09-22 PROCEDURE — 1159F MED LIST DOCD IN RCRD: CPT | Mod: ,,, | Performed by: PHYSICAL MEDICINE & REHABILITATION

## 2017-09-22 PROCEDURE — 99214 OFFICE O/P EST MOD 30 MIN: CPT | Mod: S$PBB,,, | Performed by: PHYSICAL MEDICINE & REHABILITATION

## 2017-09-22 PROCEDURE — 3078F DIAST BP <80 MM HG: CPT | Mod: ,,, | Performed by: NURSE PRACTITIONER

## 2017-09-22 PROCEDURE — 3078F DIAST BP <80 MM HG: CPT | Mod: ,,, | Performed by: PHYSICAL MEDICINE & REHABILITATION

## 2017-09-22 PROCEDURE — 3075F SYST BP GE 130 - 139MM HG: CPT | Mod: ,,, | Performed by: PHYSICAL MEDICINE & REHABILITATION

## 2017-09-22 PROCEDURE — 1125F AMNT PAIN NOTED PAIN PRSNT: CPT | Mod: ,,, | Performed by: PHYSICAL MEDICINE & REHABILITATION

## 2017-09-22 PROCEDURE — 99999 PR PBB SHADOW E&M-EST. PATIENT-LVL III: CPT | Mod: PBBFAC,,, | Performed by: PHYSICAL MEDICINE & REHABILITATION

## 2017-09-22 PROCEDURE — 36415 COLL VENOUS BLD VENIPUNCTURE: CPT

## 2017-09-22 PROCEDURE — 1125F AMNT PAIN NOTED PAIN PRSNT: CPT | Mod: ,,, | Performed by: NURSE PRACTITIONER

## 2017-09-22 PROCEDURE — 80053 COMPREHEN METABOLIC PANEL: CPT

## 2017-09-22 PROCEDURE — 1159F MED LIST DOCD IN RCRD: CPT | Mod: ,,, | Performed by: NURSE PRACTITIONER

## 2017-09-22 PROCEDURE — 3077F SYST BP >= 140 MM HG: CPT | Mod: ,,, | Performed by: NURSE PRACTITIONER

## 2017-09-22 PROCEDURE — 99213 OFFICE O/P EST LOW 20 MIN: CPT | Mod: PBBFAC,27 | Performed by: PHYSICAL MEDICINE & REHABILITATION

## 2017-09-22 PROCEDURE — 85610 PROTHROMBIN TIME: CPT

## 2017-09-22 PROCEDURE — 99999 PR PBB SHADOW E&M-EST. PATIENT-LVL V: CPT | Mod: PBBFAC,,, | Performed by: NURSE PRACTITIONER

## 2017-09-22 RX ORDER — HYDROCODONE BITARTRATE AND ACETAMINOPHEN 10; 325 MG/1; MG/1
1 TABLET ORAL EVERY 4 HOURS PRN
Qty: 150 TABLET | Refills: 0 | Status: SHIPPED | OUTPATIENT
Start: 2017-10-22 | End: 2017-12-21 | Stop reason: SDUPTHER

## 2017-09-22 RX ORDER — HYDROCODONE BITARTRATE AND ACETAMINOPHEN 10; 325 MG/1; MG/1
1 TABLET ORAL EVERY 4 HOURS PRN
Qty: 150 TABLET | Refills: 0 | Status: SHIPPED | OUTPATIENT
Start: 2017-09-22 | End: 2017-10-22

## 2017-09-22 RX ORDER — HYDROCODONE BITARTRATE AND ACETAMINOPHEN 10; 325 MG/1; MG/1
1 TABLET ORAL EVERY 4 HOURS PRN
Qty: 150 TABLET | Refills: 0 | Status: SHIPPED | OUTPATIENT
Start: 2017-11-22 | End: 2017-11-08 | Stop reason: SDUPTHER

## 2017-09-22 NOTE — PROGRESS NOTES
HEPATOLOGY FOLLOW UP    Referring Physician: Shimon Salazar MD   Current Corresponding Physician: Shimon Salazar MD     Reason for Consultation: Consultation for evaluation of Elevated Hepatic Enzymes and Cirrhosis    History of Present Illness: Vinh Castro is a 67 y.o. male who presents for evaluation of   Chief Complaint   Patient presents with    Elevated Hepatic Enzymes    Cirrhosis     Mr. 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 His liver disease has been well compensated with low MELD.   He has no c/o jaundice, confusion, abdominal distension, hematemesis, melena, edema. He has additional PMH  DM uncontrolled, HTN, HLD, CKD stage III, obesity w/ BMI 38.   He has immunity to hepatitis A but still needs vaccination for hepatitis B    EGD: PHTNG  US/CT: nodular liver w/ splenomegaly       EGD 2/2017: normal esophagus, congested and erythematous mucosa in the gastric fundus and gastric body, suspicious for PHTNG      Past Medical History:   Diagnosis Date    *Atrial fibrillation     Acquired hypothyroidism 5/4/2016    Allergy     Anemia     Anticoagulant long-term use     Arthritis     CAD (coronary artery disease)     CABG 2002, PCI    CHF (congestive heart failure)     Chronic anticoagulation     Cirrhosis of liver 01/08/2017    Coronary artery disease     Depression     Diabetic nephropathy     Diabetic retinopathy associated with type 2 diabetes mellitus     Diverticulosis     Glaucoma     patient denies having glaucoma    History of BPH     History of peripheral vascular disease     Bilateral Carotid Artery Stenosis < 40% on Carotid US 7/2012    Hyperlipidemia     Hypertension     Obesity     SEAN (obstructive sleep apnea)     uses C-PAP    Pancreatic insufficiency     Personal history of kidney stones     PN (peripheral neuropathy)     Syncope 5/23/2016    Type 2 diabetes mellitus not at goal      Outpatient Encounter Prescriptions as of 9/22/2017   Medication Sig  Dispense Refill     insulin regular U-500 concentrated (HUMULIN R U-500, CONCENTRATED,) 500 unit/mL Soln 32-33 unit edgar 30 minutes before meals and night snack (Patient taking differently: Inject 34 Units into the skin. 34 unit edgar 30 minutes before meals  3x/day) 60 mL 5    alprazolam (XANAX) 0.5 MG tablet Take 1 tablet (0.5 mg total) by mouth daily as needed. 30 tablet 0    aspirin 81 MG Chew Take 81 mg by mouth once daily.      calcium carbonate-simethicone (MAALOX ADVANCED) 1,000-60 mg Chew Take 2 tablets by mouth 3 (three) times daily as needed.      ciprofloxacin HCl (CILOXAN) 0.3 % ophthalmic solution       CREON 24,000-76,000 -120,000 unit capsule TAKE 2 CAPSULES BY MOUTH THREE TIMES DAILY WITH MEALS AND 1 CAPSULE WITH A SNACK 210 capsule 0    DEXTROSE (GLUCOSE) Chew Take by mouth as needed.      ELIQUIS 5 mg Tab TAKE 1 TABLET BY MOUTH TWICE DAILY 60 tablet 5    ferrous sulfate 325 mg (65 mg iron) Tab tablet Take 1 tablet (325 mg total) by mouth once daily. 90 tablet 1    fexofenadine (ALLEGRA) 180 MG tablet Take 180 mg by mouth once daily.      fluocinolone-shower cap 0.01 % Oil       fluticasone (FLONASE) 50 mcg/actuation nasal spray SHAKE WELL AND USE 2 SPRAYS IN EACH NOSTRIL EVERY DAY 1 Bottle 5    furosemide (LASIX) 80 MG tablet TAKE 1 TABLET BY MOUTH EVERY DAY 30 tablet 1    gabapentin (NEURONTIN) 600 MG tablet TAKE 1 TABLET(600 MG) BY MOUTH FOUR TIMES DAILY WITH MEALS 360 tablet 0    guaifenesin (MUCINEX) 600 mg 12 hr tablet Take 600 mg by mouth continuous prn for Congestion. Prn        hydrocodone-acetaminophen 10-325mg (NORCO)  mg Tab Take 1 tablet by mouth every 4 (four) hours as needed for Pain. 150 tablet 0    hydrOXYzine pamoate (VISTARIL) 25 MG Cap Take 1 capsule (25 mg total) by mouth every 8 (eight) hours as needed. 20 capsule 0    isosorbide mononitrate (IMDUR) 30 MG 24 hr tablet TAKE ONE TABLET BY MOUTH ONCE DAILY 30 tablet 5    KRILL/OM3/DHA/EPA/OM6/LIP/ASTX  (KRILL OIL, OMEGA 3 & 6, ORAL) Take 1 tablet by mouth every evening.       levothyroxine (SYNTHROID) 25 MCG tablet TAKE 1 TABLET(25 MCG) BY MOUTH EVERY DAY 30 tablet 5    losartan (COZAAR) 50 MG tablet TAKE 1 TABLET BY MOUTH EVERY DAY 90 tablet 3    metolazone (ZAROXOLYN) 5 MG tablet Take 1 tablet (5 mg total) by mouth daily as needed (Weight gain of 5 pounds or more). 30 tablet 6    metoprolol tartrate (LOPRESSOR) 50 MG tablet Take 1 tablet (50 mg total) by mouth 2 (two) times daily. 60 tablet 11    MV-MN/FA/LYCOPENE/LUT/HB#178 (RAMEZ MULTIVITAMIN FOR MEN ORAL) Take by mouth.      NEVANAC 0.1 % ophthalmic suspension       nitroGLYCERIN (NITROSTAT) 0.4 MG SL tablet Place 1 tablet (0.4 mg total) under the tongue every 5 (five) minutes as needed. 25 tablet 6    ondansetron (ZOFRAN) 4 MG tablet Take 1 tablet (4 mg total) by mouth every 8 (eight) hours as needed for Nausea. 30 tablet 0    oxymetazoline (MUCINEX SINUS-MAX) 0.05 % nasal spray 2 sprays by Nasal route once daily.      pantoprazole (PROTONIX) 40 MG tablet TAKE 1 TABLET(40 MG) BY MOUTH TWICE DAILY 60 tablet 3    simethicone (MYLICON) 125 MG chewable tablet Take 125 mg by mouth every 6 (six) hours as needed for Flatulence.      triamcinolone (NASACORT) 55 mcg nasal inhaler 2 sprays by Nasal route once daily.      zolpidem (AMBIEN) 5 MG Tab TAKE 1 TABLET BY MOUTH EVERY EVENING 30 tablet 0    betamethasone dipropionate (DIPROLENE) 0.05 % ointment       blood sugar diagnostic (ACCU-CHEK JANEY PLUS TEST STRP) Strp Apply 1 strip topically 4 (four) times daily. 600 strip 0    blood sugar diagnostic (ACCU-CHEK JANEY PLUS TEST STRP) Strp Patient to check blood sugar 6 x's per day--Dx Code 250.62,  357.2 600 each 1    blood-glucose meter (ACCU-CHEK JANEY PLUS METER) Misc Use as directed 1 each 0    diclofenac sodium (VOLTAREN) 1 % Gel Apply 2 g topically 4 (four) times daily. Apply to both hand 3 Tube 2    insulin needles, disposable, (BD ULTRA-FINE  "REJI PEN NEEDLES) 32 x 5/32 " Ndle Uses 5 daily, on multiple daily insulin injections 150 each 1    insulin syringe-needle U-100 1/2 mL 30 gauge Syrg       insulin syringe-needle U-100 1/2 mL 30 gauge x 5/16 Syrg USE WITH INSULIN 3 THREE TIMES DAILY TO FOUR TIMES DAILY 400 each 0    insulin syringe-needle U-100 1/2 mL 30 Syrg       lancets (BD ULTRA FINE LANCETS) Misc 1 Units by Misc.(Non-Drug; Combo Route) route 4 (four) times daily. 200 each 11    ofloxacin (FLOXIN) 0.3 % otic solution Place 3 drops into both ears 2 (two) times daily.       polyethylene glycol (GLYCOLAX) 17 gram/dose powder MIX AND DRINK 17 GRAMS BY MOUTH EVERY  g 0    tamsulosin (FLOMAX) 0.4 mg Cp24 TAKE 1 CAPSULE BY MOUTH EVERY DAY 90 capsule 5     No facility-administered encounter medications on file as of 9/22/2017.      Review of patient's allergies indicates:   Allergen Reactions    Cephalexin Hives and Shortness Of Breath    Penicillins Other (See Comments)     Unknown  Hardened skin    Pravastatin Other (See Comments)     myalgia    Sulfa (sulfonamide antibiotics)      Other reaction(s): Unknown    Adhesive Rash     Family History   Problem Relation Age of Onset    Cancer Mother      Rhumatoid lung disease    Diabetes Father     Heart attack Father     Heart disease Father     Cancer Father      Lymphona    Obesity Sister     Diabetes Paternal Grandfather     Anesthesia problems Neg Hx     Colon cancer Neg Hx     Esophageal cancer Neg Hx        Social History     Social History    Marital status:      Spouse name: Sandra    Number of children: 1    Years of education: N/A     Occupational History    Not on file.     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     Review of Systems   Constitutional: Negative for activity change, appetite change, chills, diaphoresis, fatigue, fever and unexpected weight change.   HENT: Negative for facial swelling.    Respiratory: Negative for cough, chest tightness and shortness of breath.    Cardiovascular: Negative for chest pain, palpitations and leg swelling.   Gastrointestinal: Negative for abdominal distention, abdominal pain, blood in stool, constipation, diarrhea, nausea and vomiting.   Musculoskeletal: Negative.  Negative for neck pain and neck stiffness.   Skin: Negative for color change, rash and wound.   Neurological: Negative for dizziness, tremors, weakness and light-headedness.   Hematological: Negative for adenopathy. Does not bruise/bleed easily.   Psychiatric/Behavioral: Negative for agitation and decreased concentration. The patient is not nervous/anxious.      Vitals:    09/22/17 0918   BP: (!) 150/64   Pulse: 60   Resp: 18   Temp: 97.6 °F (36.4 °C)       Physical Exam   Constitutional: He is oriented to person, place, and time. He appears well-developed and well-nourished. No distress.   HENT:   Head: Normocephalic and atraumatic.   Eyes: Conjunctivae are normal. No scleral icterus.   Neck: Normal range of motion. Neck supple.   Cardiovascular: Normal rate.    Pulmonary/Chest: Effort normal.   Abdominal: Soft. He exhibits no distension and no mass. There is no tenderness. There is no rebound and no guarding.   Musculoskeletal: Normal range of motion.   Neurological: He is alert and oriented to person, place, and time. Coordination normal.   No asterixis   Skin: Skin is warm and dry. No rash noted. He is not diaphoretic. No erythema.   Psychiatric: He has a normal mood and affect. His behavior is normal. Judgment and thought content normal.       MELD-Na score: 16 at 9/7/2017  8:00 AM  MELD score: 15 at 9/7/2017  8:00 AM  Calculated from:  Serum Creatinine: 1.7 mg/dL at 9/7/2017  8:00 AM  Serum Sodium: 136  mmol/L at 9/7/2017  8:00 AM  Total Bilirubin: 0.7 mg/dL (Rounded to 1) at 9/7/2017  8:00 AM  INR(ratio): 1.4 at 9/7/2017  8:00 AM  Age: 67 years    Lab Results   Component Value Date     (H) 09/07/2017    BUN 32 (H) 09/07/2017    CREATININE 1.7 (H) 09/07/2017    CALCIUM 9.5 09/07/2017     09/07/2017    K 4.6 09/07/2017    CL 99 09/07/2017    PROT 7.6 09/07/2017    CO2 28 09/07/2017    ANIONGAP 9 09/07/2017    WBC 5.84 09/07/2017    RBC 3.43 (L) 09/07/2017    HGB 11.1 (L) 09/07/2017    HCT 34.0 (L) 09/07/2017    HCT 24 (L) 08/27/2014    MCV 99 (H) 09/07/2017    MCH 32.4 (H) 09/07/2017    MCHC 32.6 09/07/2017     Lab Results   Component Value Date    RDW 14.3 09/07/2017     09/07/2017    MPV 10.2 09/07/2017    GRAN 4.1 09/07/2017    GRAN 69.9 09/07/2017    LYMPH 1.1 09/07/2017    LYMPH 18.0 09/07/2017    MONO 0.5 09/07/2017    MONO 9.1 09/07/2017    EOSINOPHIL 2.1 09/07/2017    BASOPHIL 0.9 09/07/2017    EOS 0.1 09/07/2017    BASO 0.05 09/07/2017    APTT 41.9 (H) 08/24/2015    GROUPTRH B POS 08/24/2015    BNP 52 06/06/2017    CHOL 144 07/26/2017    TRIG 180 (H) 07/26/2017    HDL 37 (L) 07/26/2017    CHOLHDL 25.7 07/26/2017    TOTALCHOLEST 3.9 07/26/2017    ALBUMIN 3.7 09/07/2017    BILIDIR 0.2 07/07/2017    AST 37 09/07/2017    ALT 29 09/07/2017    ALKPHOS 96 09/07/2017    MG 2.2 12/17/2014    LABPROT 15.8 (H) 09/07/2017    INR 1.4 (H) 09/07/2017    PSA 0.17 07/10/2013       Assessment and Plan:  Liver cirrhosis, PARRISH: well compensated, w/ a MELD 16, driven by elevated creatinine in the setting of stage III CKD and and elevated INR in the setting of anticoagulation (Eliquis, ASA)  -will repeat INR  -patient will pursue hepatitis B vaccine locally  -HCC surveillance: next due March  -EV surveillance: EGD next due 2/2019    RTC 6 months w/ pre visit labs/US      Patient Active Problem List   Diagnosis    SEAN (obstructive sleep apnea)    Hyperlipidemia    Erectile dysfunction    Peyronie's disease     GERD (gastroesophageal reflux disease)    Macrocytic anemia    DR (diabetic retinopathy)    History of colonic polyps    PAD (peripheral artery disease)    Chronic allergic rhinitis    Depression    Steatosis of liver    Glaucoma (increased eye pressure)    Mild vitamin D deficiency    DDD (degenerative disc disease), cervical    CKD (chronic kidney disease) stage 3, GFR 30-59 ml/min    BPH (benign prostatic hyperplasia)    Long term current use of anticoagulant therapy    Diabetic peripheral neuropathy    Morbid obesity due to excess calories    Preop cardiovascular exam    Morbid obesity with BMI of 40.0-44.9, adult    Diabetic nephropathy    DM (diabetes mellitus), type 2, uncontrolled w/neurologic complication    Status post ablation of atrial flutter    Heart failure with preserved left ventricular function (HFpEF)    Chronic back pain    Paroxysmal atrial fibrillation    Coronary artery disease involving native coronary artery without angina pectoris    Essential hypertension    S/P ablation of atrial fibrillation    Acquired hypothyroidism    Type II diabetes mellitus with manifestations, uncontrolled    Chronic neck pain    Spondylitis, cervical    Primary osteoarthritis of hand    Elevated LFTs    Portal hypertensive gastropathy    Liver cirrhosis secondary to PARRISH    Pancreatic insufficiency    Chronic nausea    CKD (chronic kidney disease), stage III    Hypertensive CKD (chronic kidney disease)    Proteinuria    Carpal tunnel syndrome of right wrist    Acquired renal cyst of right kidney    Iron deficiency anemia    Lumbar radiculopathy     Vinh Castro is a 67 y.o. male withElevated Hepatic Enzymes and Cirrhosis

## 2017-09-22 NOTE — PROGRESS NOTES
""This note will not be shared with the patient."Subjective:       Patient ID: Vinh Castro is a 67 y.o. male.    Chief Complaint: Hip Pain (R hip) and Back Pain    Back Pain   Associated symptoms include leg pain. Pertinent negatives include no abdominal pain, chest pain, fever or headaches.   Hip Pain      Hand Pain    Pertinent negatives include no chest pain.   Neck Pain    Associated symptoms include leg pain. Pertinent negatives include no chest pain, fever, headaches or trouble swallowing.   Leg Pain      Shoulder Pain    Pertinent negatives include no fever or headaches.      Subjective:       Patient ID: Vinh Castro is a 67 y.o. male.    Chief Complaint: Hip Pain (R hip) and Back Pain  Mr. Castro returns  to clinic for chronic back and leg pain.    LCV 08/24/17.  He reports improvement of intense Back pain since LCV.  He states that increase in Hydrocodone 10/325 mg, from 4 to 5 tabs/day , helped him greatly, so he did not need to gt ISABEL,   so he canceled.   #1 Back pain.  Current low back pain is 5-6, worst pain 7-8/10.   Back pain is now localized in Rt buttock, and pain does not run any more down the RT leg.  Pain in Rt buttock, he describes as disabling, and intense sharp, shooting pain down the leg, and also as a  burning pain.   He cannot to bend leg , it hurts to wear a shoes, he does that in sitting position, but he cannot bend Rt leg to place a shoe on foot.  He has diabetic polyneuropathy.  He reports that back pain is off/on,shooting and  burning pain.   Sitting, makes his back pain worse, lying down improves the pain.  Also he has a pain with walking, able to walk  ~ 1 blocks.  No true weakness in legs, no numbness, paraesthesias.  No BB incontinence., no saddle anesthesia.  He is interested in epidural injection to his back.  He takes Gabapentin 600 mg po QID, that is helping   Tolerates well Hydrocodone,and wants to continue the same medications.   He completed Physical therapy, and now doing " exercises at home.   Here for follow up, and chronic pain with opiates    Past Medical History:   Diagnosis Date    *Atrial fibrillation     Acquired hypothyroidism 5/4/2016    Allergy     Anemia     Anticoagulant long-term use     Arthritis     CAD (coronary artery disease)     CABG 2002, PCI    CHF (congestive heart failure)     Chronic anticoagulation     Cirrhosis of liver 01/08/2017    Coronary artery disease     Depression     Diabetic nephropathy     Diabetic retinopathy associated with type 2 diabetes mellitus     Diverticulosis     Glaucoma     patient denies having glaucoma    History of BPH     History of peripheral vascular disease     Bilateral Carotid Artery Stenosis < 40% on Carotid US 7/2012    Hyperlipidemia     Hypertension     Obesity     SEAN (obstructive sleep apnea)     uses C-PAP    Pancreatic insufficiency     Personal history of kidney stones     PN (peripheral neuropathy)     Syncope 5/23/2016    Type 2 diabetes mellitus not at goal        Past Surgical History:   Procedure Laterality Date    ABDOMINAL SURGERY      CATARACT EXTRACTION BILATERAL W/ ANTERIOR VITRECTOMY      COLONOSCOPY      COLONOSCOPY N/A 7/21/2017    Procedure: COLONOSCOPY;  Surgeon: Roberto Sotelo MD;  Location: [facility] ([address] [place] [address]);  Service: Endoscopy;  Laterality: N/A;  pt requesting ASAP. hx of cirrhosis-labs ordered prior       Per protocol, pt does not have history of stroke/TIA. Ok to hold eliquis x 48 hours prior to procedure-per Sonya Quezada RN-(arrythmia clinic)         CORONARY ANGIOPLASTY  2014    Patent LIMA to LAD, SVG to OM, occluded LAD, 60% Circ-x1cv stent placed    CORONARY ARTERY BYPASS GRAFT  2001    iyer LAD SVG OM1    EYE SURGERY      retina repair    GALLBLADDER SURGERY      HAND SURGERY      LOST THREE FINGERS/left 1979    HERNIA REPAIR      JOINT REPLACEMENT      RADIOFREQUENCY ABLATION  8/14    atrial flutter    RETINAL LASER PROCEDURE      SKIN BIOPSY       head    TONSILLECTOMY      UPPER GASTROINTESTINAL ENDOSCOPY         Family History   Problem Relation Age of Onset    Cancer Mother      Rhumatoid lung disease    Diabetes Father     Heart attack Father     Heart disease Father     Cancer Father      Lymphona    Obesity Sister     Diabetes Paternal Grandfather     Anesthesia problems Neg Hx     Colon cancer Neg Hx     Esophageal cancer Neg Hx        Social History     Social History    Marital status:      Spouse name: Sandra    Number of children: 1    Years of education: N/A     Social History Main Topics    Smoking status: Never Smoker    Smokeless tobacco: Never Used    Alcohol use No    Drug use: No    Sexual activity: No     Other Topics Concern    None     Social History Narrative    Work: Manager at [employer] activity: None.Hobbies: Fishing.       Current Outpatient Prescriptions   Medication Sig Dispense Refill     insulin regular U-500 concentrated (HUMULIN R U-500, CONCENTRATED,) 500 unit/mL Soln 32-33 unit edgar 30 minutes before meals and night snack (Patient taking differently: Inject 34 Units into the skin. 34 unit edgar 30 minutes before meals  3x/day) 60 mL 5    alprazolam (XANAX) 0.5 MG tablet Take 1 tablet (0.5 mg total) by mouth daily as needed. 30 tablet 0    aspirin 81 MG Chew Take 81 mg by mouth once daily.      betamethasone dipropionate (DIPROLENE) 0.05 % ointment       blood sugar diagnostic (ACCU-CHEK JANEY PLUS TEST STRP) Strp Apply 1 strip topically 4 (four) times daily. 600 strip 0    blood sugar diagnostic (ACCU-CHEK JANEY PLUS TEST STRP) Strp Patient to check blood sugar 6 x's per day--Dx Code 250.62,  357.2 600 each 1    blood-glucose meter (ACCU-CHEK JANEY PLUS METER) Misc Use as directed 1 each 0    calcium carbonate-simethicone (MAALOX ADVANCED) 1,000-60 mg Chew Take 2 tablets by mouth 3 (three) times daily as needed.      ciprofloxacin HCl (CILOXAN) 0.3 % ophthalmic solution       CREON  "24,000-76,000 -120,000 unit capsule TAKE 2 CAPSULES BY MOUTH THREE TIMES DAILY WITH MEALS AND 1 CAPSULE WITH A SNACK 210 capsule 0    DEXTROSE (GLUCOSE) Chew Take by mouth as needed.      diclofenac sodium (VOLTAREN) 1 % Gel Apply 2 g topically 4 (four) times daily. Apply to both hand 3 Tube 2    ELIQUIS 5 mg Tab TAKE 1 TABLET BY MOUTH TWICE DAILY 60 tablet 5    ferrous sulfate 325 mg (65 mg iron) Tab tablet Take 1 tablet (325 mg total) by mouth once daily. 90 tablet 1    fexofenadine (ALLEGRA) 180 MG tablet Take 180 mg by mouth once daily.      fluocinolone-shower cap 0.01 % Oil       fluticasone (FLONASE) 50 mcg/actuation nasal spray SHAKE WELL AND USE 2 SPRAYS IN EACH NOSTRIL EVERY DAY 1 Bottle 5    furosemide (LASIX) 80 MG tablet TAKE 1 TABLET BY MOUTH EVERY DAY 30 tablet 1    gabapentin (NEURONTIN) 600 MG tablet TAKE 1 TABLET(600 MG) BY MOUTH FOUR TIMES DAILY WITH MEALS 360 tablet 0    guaifenesin (MUCINEX) 600 mg 12 hr tablet Take 600 mg by mouth continuous prn for Congestion. Prn        hydrocodone-acetaminophen 10-325mg (NORCO)  mg Tab Take 1 tablet by mouth every 4 (four) hours as needed for Pain. 150 tablet 0    [START ON 10/22/2017] hydrocodone-acetaminophen 10-325mg (NORCO)  mg Tab Take 1 tablet by mouth every 4 (four) hours as needed for Pain. 150 tablet 0    [START ON 11/22/2017] hydrocodone-acetaminophen 10-325mg (NORCO)  mg Tab Take 1 tablet by mouth every 4 (four) hours as needed for Pain. 150 tablet 0    hydrOXYzine pamoate (VISTARIL) 25 MG Cap Take 1 capsule (25 mg total) by mouth every 8 (eight) hours as needed. 20 capsule 0    insulin needles, disposable, (BD ULTRA-FINE REJI PEN NEEDLES) 32 x 5/32 " Ndle Uses 5 daily, on multiple daily insulin injections 150 each 1    insulin syringe-needle U-100 1/2 mL 30 gauge Syrg       insulin syringe-needle U-100 1/2 mL 30 gauge x 5/16 Syrg USE WITH INSULIN 3 THREE TIMES DAILY TO FOUR TIMES DAILY 400 each 0    insulin " syringe-needle U-100 1/2 mL 30 Syrg       isosorbide mononitrate (IMDUR) 30 MG 24 hr tablet TAKE ONE TABLET BY MOUTH ONCE DAILY 30 tablet 5    KRILL/OM3/DHA/EPA/OM6/LIP/ASTX (KRILL OIL, OMEGA 3 & 6, ORAL) Take 1 tablet by mouth every evening.       lancets (BD ULTRA FINE LANCETS) Misc 1 Units by Misc.(Non-Drug; Combo Route) route 4 (four) times daily. 200 each 11    levothyroxine (SYNTHROID) 25 MCG tablet TAKE 1 TABLET(25 MCG) BY MOUTH EVERY DAY 30 tablet 5    losartan (COZAAR) 50 MG tablet TAKE 1 TABLET BY MOUTH EVERY DAY 90 tablet 3    metolazone (ZAROXOLYN) 5 MG tablet Take 1 tablet (5 mg total) by mouth daily as needed (Weight gain of 5 pounds or more). 30 tablet 6    metoprolol tartrate (LOPRESSOR) 50 MG tablet Take 1 tablet (50 mg total) by mouth 2 (two) times daily. 60 tablet 11    MV-MN/FA/LYCOPENE/LUT/HB#178 (RAMEZ MULTIVITAMIN FOR MEN ORAL) Take by mouth.      NEVANAC 0.1 % ophthalmic suspension       nitroGLYCERIN (NITROSTAT) 0.4 MG SL tablet Place 1 tablet (0.4 mg total) under the tongue every 5 (five) minutes as needed. 25 tablet 6    ofloxacin (FLOXIN) 0.3 % otic solution Place 3 drops into both ears 2 (two) times daily.       ondansetron (ZOFRAN) 4 MG tablet Take 1 tablet (4 mg total) by mouth every 8 (eight) hours as needed for Nausea. 30 tablet 0    oxymetazoline (MUCINEX SINUS-MAX) 0.05 % nasal spray 2 sprays by Nasal route once daily.      pantoprazole (PROTONIX) 40 MG tablet TAKE 1 TABLET(40 MG) BY MOUTH TWICE DAILY 60 tablet 3    polyethylene glycol (GLYCOLAX) 17 gram/dose powder MIX AND DRINK 17 GRAMS BY MOUTH EVERY  g 0    simethicone (MYLICON) 125 MG chewable tablet Take 125 mg by mouth every 6 (six) hours as needed for Flatulence.      tamsulosin (FLOMAX) 0.4 mg Cp24 TAKE 1 CAPSULE BY MOUTH EVERY DAY 90 capsule 5    triamcinolone (NASACORT) 55 mcg nasal inhaler 2 sprays by Nasal route once daily.      zolpidem (AMBIEN) 5 MG Tab TAKE 1 TABLET BY MOUTH EVERY EVENING 30  tablet 0     No current facility-administered medications for this visit.        Review of patient's allergies indicates:   Allergen Reactions    Cephalexin Hives and Shortness Of Breath    Penicillins Other (See Comments)     Unknown  Hardened skin    Pravastatin Other (See Comments)     myalgia    Sulfa (sulfonamide antibiotics)      Other reaction(s): Unknown    Adhesive Rash     Review of Systems   Constitutional: Negative for activity change, appetite change, chills, diaphoresis, fatigue, fever and unexpected weight change.   HENT: Negative for trouble swallowing and voice change.    Eyes: Negative for pain and visual disturbance.   Respiratory: Negative for chest tightness, shortness of breath and wheezing.    Cardiovascular: Negative for chest pain, palpitations and leg swelling.   Gastrointestinal: Negative for abdominal pain, constipation and diarrhea.   Genitourinary: Negative for difficulty urinating, frequency and urgency.   Musculoskeletal: Positive for back pain and neck pain. Negative for arthralgias, joint swelling, myalgias and neck stiffness.   Skin: Negative for rash and wound.   Neurological: Negative for dizziness, facial asymmetry, speech difficulty, light-headedness and headaches.   Hematological: Negative for adenopathy.   Psychiatric/Behavioral: Negative for agitation, behavioral problems, confusion, decreased concentration, dysphoric mood and sleep disturbance.         Objective:      Physical Exam    GENERAL: The patient is alert, oriented, pleasant, obese.   MUSCULOSKELETAL:   Gait is normal, walks without cane, on wide basis, small steps, improved balance.  Cervical spine full range of motion in all planes.   Lumbar spine, decreased active range of motion in all planes, flexion to 90 degrees, with shooting,   Side bending and rotation to 20-25 degrees bilateral,    Positive facet loading bilateral, R>L.    Straight leg raising positive bilaterally.   Full range of motion in all  joints x4 extremities.   Muscle strength 5/5 throughout x4 extremities.   No  joint laxity throughout x4 extremities.   Rt  hand  incomplete, weak, can bend all fingers but pinky and ring finger flexion is delayed.  NEUROLOGIC: Cranial nerves II through XII intact.   Deep tendon reflexes is normal, +2 in the upper and lower extremities bilaterally.   Muscle tone is normal.   Sensory is intact to light touch and pinprick throughout x 4 extremities.     Xray of Lumbar spine.   Lumbar spine demonstrates mild to moderate DJD. Marginal osteophytes seen at multiple levels.   Facet hypertrophy seen at the lower levels. Vertebral body heights are fairly well maintained.   Flexion-extension views show no evidence of instability     MRI of Lumbar spine showed ( 10/23/14):   L4-5: Circumferential disk bulge, bilateral facet arthropathy, and ligamentum flavum hypertrophy results in moderate bilateral neural foraminal narrowing    and no significant spinal canal narrowing.  L5-S1: Severe bilateral facet arthropathy and ligament flavum hypertrophy resulting in significant spinal canal or neural foraminal narrowing.   Impression:  Multilevel lumbar spondylosis as detailed above resulting in moderate bilateral neural foraminal narrowing at L4-5 and mild bilateral neural foraminal narrowing at L3-4.    Assessment:           1. Chronic low back pain with sciatica, sciatica laterality unspecified, unspecified back pain laterality    2. Lumbar radiculopathy    3. Diabetic peripheral neuropathy    4. Chronic neck pain    5. Chronic bilateral low back pain without sciatica    6. Carpal tunnel syndrome of right wrist    7. DDD (degenerative disc disease), cervical    8. Spondylitis, cervical    9. Primary osteoarthritis of right hand        Plan:       Chronic low back pain with sciatica, sciatica laterality unspecified, unspecified back pain laterality  -     hydrocodone-acetaminophen 10-325mg (NORCO)  mg Tab; Take 1 tablet by  mouth every 4 (four) hours as needed for Pain.  Dispense: 150 tablet; Refill: 0  -     hydrocodone-acetaminophen 10-325mg (NORCO)  mg Tab; Take 1 tablet by mouth every 4 (four) hours as needed for Pain.  Dispense: 150 tablet; Refill: 0  -     hydrocodone-acetaminophen 10-325mg (NORCO)  mg Tab; Take 1 tablet by mouth every 4 (four) hours as needed for Pain.  Dispense: 150 tablet; Refill: 0    Lumbar radiculopathy  -     hydrocodone-acetaminophen 10-325mg (NORCO)  mg Tab; Take 1 tablet by mouth every 4 (four) hours as needed for Pain.  Dispense: 150 tablet; Refill: 0  -     hydrocodone-acetaminophen 10-325mg (NORCO)  mg Tab; Take 1 tablet by mouth every 4 (four) hours as needed for Pain.  Dispense: 150 tablet; Refill: 0  -     hydrocodone-acetaminophen 10-325mg (NORCO)  mg Tab; Take 1 tablet by mouth every 4 (four) hours as needed for Pain.  Dispense: 150 tablet; Refill: 0    Diabetic peripheral neuropathy  -     hydrocodone-acetaminophen 10-325mg (NORCO)  mg Tab; Take 1 tablet by mouth every 4 (four) hours as needed for Pain.  Dispense: 150 tablet; Refill: 0  -     hydrocodone-acetaminophen 10-325mg (NORCO)  mg Tab; Take 1 tablet by mouth every 4 (four) hours as needed for Pain.  Dispense: 150 tablet; Refill: 0  -     hydrocodone-acetaminophen 10-325mg (NORCO)  mg Tab; Take 1 tablet by mouth every 4 (four) hours as needed for Pain.  Dispense: 150 tablet; Refill: 0    Chronic neck pain  -     hydrocodone-acetaminophen 10-325mg (NORCO)  mg Tab; Take 1 tablet by mouth every 4 (four) hours as needed for Pain.  Dispense: 150 tablet; Refill: 0  -     hydrocodone-acetaminophen 10-325mg (NORCO)  mg Tab; Take 1 tablet by mouth every 4 (four) hours as needed for Pain.  Dispense: 150 tablet; Refill: 0  -     hydrocodone-acetaminophen 10-325mg (NORCO)  mg Tab; Take 1 tablet by mouth every 4 (four) hours as needed for Pain.  Dispense: 150 tablet; Refill:  0    Chronic bilateral low back pain without sciatica  -     hydrocodone-acetaminophen 10-325mg (NORCO)  mg Tab; Take 1 tablet by mouth every 4 (four) hours as needed for Pain.  Dispense: 150 tablet; Refill: 0  -     hydrocodone-acetaminophen 10-325mg (NORCO)  mg Tab; Take 1 tablet by mouth every 4 (four) hours as needed for Pain.  Dispense: 150 tablet; Refill: 0  -     hydrocodone-acetaminophen 10-325mg (NORCO)  mg Tab; Take 1 tablet by mouth every 4 (four) hours as needed for Pain.  Dispense: 150 tablet; Refill: 0    Carpal tunnel syndrome of right wrist  -     hydrocodone-acetaminophen 10-325mg (NORCO)  mg Tab; Take 1 tablet by mouth every 4 (four) hours as needed for Pain.  Dispense: 150 tablet; Refill: 0  -     hydrocodone-acetaminophen 10-325mg (NORCO)  mg Tab; Take 1 tablet by mouth every 4 (four) hours as needed for Pain.  Dispense: 150 tablet; Refill: 0  -     hydrocodone-acetaminophen 10-325mg (NORCO)  mg Tab; Take 1 tablet by mouth every 4 (four) hours as needed for Pain.  Dispense: 150 tablet; Refill: 0    DDD (degenerative disc disease), cervical  -     hydrocodone-acetaminophen 10-325mg (NORCO)  mg Tab; Take 1 tablet by mouth every 4 (four) hours as needed for Pain.  Dispense: 150 tablet; Refill: 0  -     hydrocodone-acetaminophen 10-325mg (NORCO)  mg Tab; Take 1 tablet by mouth every 4 (four) hours as needed for Pain.  Dispense: 150 tablet; Refill: 0  -     hydrocodone-acetaminophen 10-325mg (NORCO)  mg Tab; Take 1 tablet by mouth every 4 (four) hours as needed for Pain.  Dispense: 150 tablet; Refill: 0    Spondylitis, cervical  -     hydrocodone-acetaminophen 10-325mg (NORCO)  mg Tab; Take 1 tablet by mouth every 4 (four) hours as needed for Pain.  Dispense: 150 tablet; Refill: 0  -     hydrocodone-acetaminophen 10-325mg (NORCO)  mg Tab; Take 1 tablet by mouth every 4 (four) hours as needed for Pain.  Dispense: 150 tablet; Refill: 0  -      hydrocodone-acetaminophen 10-325mg (NORCO)  mg Tab; Take 1 tablet by mouth every 4 (four) hours as needed for Pain.  Dispense: 150 tablet; Refill: 0    Primary osteoarthritis of right hand  -     hydrocodone-acetaminophen 10-325mg (NORCO)  mg Tab; Take 1 tablet by mouth every 4 (four) hours as needed for Pain.  Dispense: 150 tablet; Refill: 0  -     hydrocodone-acetaminophen 10-325mg (NORCO)  mg Tab; Take 1 tablet by mouth every 4 (four) hours as needed for Pain.  Dispense: 150 tablet; Refill: 0  -     hydrocodone-acetaminophen 10-325mg (NORCO)  mg Tab; Take 1 tablet by mouth every 4 (four) hours as needed for Pain.  Dispense: 150 tablet; Refill: 0    Patient with worsening of chronic back pain, secondary to lumbar spondylosis, facet arthropathy, and possible Rt L5-S1 lumbar radiculopathy.  I had a prolong discussion with patient and his wife, about possible causes of his pain.     1. Discussed the back and Rt leg pain, its course and treatment.   Questions answered.      2. Diagnostics/Imaging:   Xrays of lumbar spine and MRI of lumbar spine results ( 2014) discussed again in details.    3. Pain management:   Hydrocodone 10/325 mg po q6 hrs prn pain, #150, with 2 refills.  Will resume  Neurontin 600 mg po tid- qid if tolerated ( one in am / noon/ at 2 at bedtime).    4. Currently getting HH. RN for DM neuropathy, and going to Chiropractor ,      Follow up in 3 months.     Total time spent face to face with patient was 25 minutes.   More than 50% of that time was spent in counseling on diagnosis , prognosis and treatment options.   I also caunsel patient  on common and most usual side effect of prescribed medications.   Risk and benefits of opiates, possible risk of developing opiate dependence and tolerance, need of strict compliance with prescribed medications.  I reviewed Primary care , and other specialty's notes to better coordinate patient's  care.   All questions were answered, and  patient voiced understanding.

## 2017-09-22 NOTE — TELEPHONE ENCOUNTER
MA called patient to inform him to decrease his diet in potassium because his potassium is high. Patient understood and verbalized understanding.AYDE

## 2017-09-22 NOTE — TELEPHONE ENCOUNTER
----- Message from Suzanne Nielson NP sent at 9/22/2017  4:10 PM CDT -----  Instruct decrease dietary K

## 2017-09-25 ENCOUNTER — TELEPHONE (OUTPATIENT)
Dept: HEPATOLOGY | Facility: CLINIC | Age: 67
End: 2017-09-25

## 2017-09-25 DIAGNOSIS — E87.5 HYPERKALEMIA: Primary | ICD-10-CM

## 2017-09-25 NOTE — TELEPHONE ENCOUNTER
----- Message from Suzanne Nielson NP sent at 9/25/2017  3:51 PM CDT -----  Notify patient , repeat K

## 2017-09-27 ENCOUNTER — TELEPHONE (OUTPATIENT)
Dept: INTERNAL MEDICINE | Facility: CLINIC | Age: 67
End: 2017-09-27

## 2017-09-27 NOTE — TELEPHONE ENCOUNTER
----- Message from Donna Olivo sent at 9/27/2017 10:26 AM CDT -----  Contact: Barbara/[facility]  [phone]  Needs the most recent H&P.    Please call and advise.    Thank You

## 2017-09-28 DIAGNOSIS — K86.81 EXOCRINE PANCREATIC INSUFFICIENCY: ICD-10-CM

## 2017-09-29 RX ORDER — PANCRELIPASE 24000; 76000; 120000 [USP'U]/1; [USP'U]/1; [USP'U]/1
CAPSULE, DELAYED RELEASE PELLETS ORAL
Qty: 210 CAPSULE | Refills: 0 | Status: SHIPPED | OUTPATIENT
Start: 2017-09-29 | End: 2017-10-26 | Stop reason: SDUPTHER

## 2017-10-03 DIAGNOSIS — Z86.79 ATRIAL FIBRILLATION, CURRENTLY IN SINUS RHYTHM: ICD-10-CM

## 2017-10-04 RX ORDER — SYRINGE,SAFETY WITH NEEDLE,1ML 25GX1"
SYRINGE (EA) MISCELLANEOUS
Qty: 400 EACH | Refills: 0 | Status: SHIPPED | OUTPATIENT
Start: 2017-10-04 | End: 2017-12-19 | Stop reason: SDUPTHER

## 2017-10-04 RX ORDER — APIXABAN 5 MG/1
TABLET, FILM COATED ORAL
Qty: 60 TABLET | Refills: 3 | Status: SHIPPED | OUTPATIENT
Start: 2017-10-04 | End: 2018-02-15 | Stop reason: SDUPTHER

## 2017-10-06 DIAGNOSIS — R11.0 NAUSEA: ICD-10-CM

## 2017-10-06 RX ORDER — ONDANSETRON 4 MG/1
TABLET, FILM COATED ORAL
Qty: 30 TABLET | Refills: 0 | Status: SHIPPED | OUTPATIENT
Start: 2017-10-06 | End: 2017-12-18 | Stop reason: SDUPTHER

## 2017-10-10 RX ORDER — POLYETHYLENE GLYCOL 3350 17 G/17G
POWDER, FOR SOLUTION ORAL
Qty: 527 G | Refills: 0 | Status: SHIPPED | OUTPATIENT
Start: 2017-10-10 | End: 2017-11-07 | Stop reason: SDUPTHER

## 2017-10-26 DIAGNOSIS — K86.81 EXOCRINE PANCREATIC INSUFFICIENCY: ICD-10-CM

## 2017-10-28 DIAGNOSIS — E03.9 ACQUIRED HYPOTHYROIDISM: ICD-10-CM

## 2017-10-30 RX ORDER — LEVOTHYROXINE SODIUM 25 UG/1
TABLET ORAL
Qty: 30 TABLET | Refills: 0 | Status: SHIPPED | OUTPATIENT
Start: 2017-10-30 | End: 2017-12-01 | Stop reason: SDUPTHER

## 2017-11-03 DIAGNOSIS — I50.30 HEART FAILURE WITH PRESERVED EJECTION FRACTION: ICD-10-CM

## 2017-11-03 DIAGNOSIS — I10 ESSENTIAL HYPERTENSION: ICD-10-CM

## 2017-11-03 RX ORDER — PANCRELIPASE 24000; 76000; 120000 [USP'U]/1; [USP'U]/1; [USP'U]/1
CAPSULE, DELAYED RELEASE PELLETS ORAL
Qty: 210 CAPSULE | Refills: 0 | Status: SHIPPED | OUTPATIENT
Start: 2017-11-03 | End: 2017-12-01 | Stop reason: SDUPTHER

## 2017-11-03 RX ORDER — FUROSEMIDE 40 MG/1
TABLET ORAL
Qty: 30 TABLET | Refills: 0 | Status: SHIPPED | OUTPATIENT
Start: 2017-11-03 | End: 2017-12-01 | Stop reason: SDUPTHER

## 2017-11-03 NOTE — TELEPHONE ENCOUNTER
Spoke to patient he states on a daily basis he take 80 mg and if he is retaining to much fluid he will add the 40 mg in the afternoon and 3 days. [place] is in need of both prescriptions. Also has an appointment to est. Care with a new PCP.

## 2017-11-03 NOTE — TELEPHONE ENCOUNTER
Elier patient    Please call to check is he taking Lasix 40?  Also has Lasix 80 on chart as well as zaroxylyn    Needs new PCP for annual next year

## 2017-11-06 DIAGNOSIS — I50.30 HEART FAILURE WITH PRESERVED LEFT VENTRICULAR FUNCTION (HFPEF): ICD-10-CM

## 2017-11-06 RX ORDER — FUROSEMIDE 80 MG/1
80 TABLET ORAL DAILY
Qty: 30 TABLET | Refills: 1 | Status: SHIPPED | OUTPATIENT
Start: 2017-11-06 | End: 2018-01-19 | Stop reason: SDUPTHER

## 2017-11-07 RX ORDER — POLYETHYLENE GLYCOL 3350 17 G/17G
POWDER, FOR SOLUTION ORAL
Qty: 527 G | Refills: 0 | Status: SHIPPED | OUTPATIENT
Start: 2017-11-07 | End: 2017-12-06 | Stop reason: SDUPTHER

## 2017-11-30 DIAGNOSIS — R11.0 NAUSEA: ICD-10-CM

## 2017-11-30 DIAGNOSIS — K86.81 EXOCRINE PANCREATIC INSUFFICIENCY: ICD-10-CM

## 2017-11-30 RX ORDER — ONDANSETRON 4 MG/1
TABLET, FILM COATED ORAL
Qty: 30 TABLET | Refills: 0 | OUTPATIENT
Start: 2017-11-30

## 2017-12-01 DIAGNOSIS — E03.9 ACQUIRED HYPOTHYROIDISM: ICD-10-CM

## 2017-12-01 DIAGNOSIS — I50.30 HEART FAILURE WITH PRESERVED EJECTION FRACTION: ICD-10-CM

## 2017-12-01 DIAGNOSIS — I10 ESSENTIAL HYPERTENSION: ICD-10-CM

## 2017-12-01 RX ORDER — LEVOTHYROXINE SODIUM 25 UG/1
25 TABLET ORAL
Qty: 30 TABLET | Refills: 11 | Status: SHIPPED | OUTPATIENT
Start: 2017-12-01 | End: 2018-09-02 | Stop reason: SDUPTHER

## 2017-12-01 RX ORDER — PANCRELIPASE 24000; 76000; 120000 [USP'U]/1; [USP'U]/1; [USP'U]/1
CAPSULE, DELAYED RELEASE PELLETS ORAL
Qty: 210 CAPSULE | Refills: 0 | Status: SHIPPED | OUTPATIENT
Start: 2017-12-01 | End: 2018-01-15 | Stop reason: SDUPTHER

## 2017-12-01 RX ORDER — ISOSORBIDE MONONITRATE 30 MG/1
TABLET, EXTENDED RELEASE ORAL
Qty: 30 TABLET | Refills: 0 | Status: SHIPPED | OUTPATIENT
Start: 2017-12-01 | End: 2018-02-15

## 2017-12-01 NOTE — TELEPHONE ENCOUNTER
levothyroxine (SYNTHROID) 25 MCG tablet  N/A       Disp: 30 tablet Refills: 0    Class: Normal Start: 12/1/2017   To be filled at: [id number]  [facility] [place] [facility] [address] AT [facility] [address]: [phone]

## 2017-12-02 RX ORDER — FUROSEMIDE 40 MG/1
TABLET ORAL
Qty: 30 TABLET | Refills: 0 | Status: SHIPPED | OUTPATIENT
Start: 2017-12-02 | End: 2018-05-03 | Stop reason: SDUPTHER

## 2017-12-04 ENCOUNTER — PATIENT MESSAGE (OUTPATIENT)
Dept: NEPHROLOGY | Facility: CLINIC | Age: 67
End: 2017-12-04

## 2017-12-06 NOTE — TELEPHONE ENCOUNTER
----- Message from Debra Alexandre sent at 12/6/2017 11:51 AM CST -----  Contact: [place]- [phone]                                Prescription Refill Request  Name of medication and dose polyethylene glycol (GLYCOLAX) 17 gram/dose powder    Pharmacy [facility] [id number]  [place], [place] - [address] AT [facility] [address] [phone]      Are you completely out of your medication YES    Additional Information:

## 2017-12-07 RX ORDER — POLYETHYLENE GLYCOL 3350 17 G/17G
POWDER, FOR SOLUTION ORAL
Qty: 527 G | Refills: 0 | Status: SHIPPED | OUTPATIENT
Start: 2017-12-07 | End: 2018-01-07 | Stop reason: SDUPTHER

## 2017-12-14 DIAGNOSIS — M50.30 DDD (DEGENERATIVE DISC DISEASE), CERVICAL: ICD-10-CM

## 2017-12-14 DIAGNOSIS — M54.5 CHRONIC LOW BACK PAIN, UNSPECIFIED BACK PAIN LATERALITY, WITH SCIATICA PRESENCE UNSPECIFIED: ICD-10-CM

## 2017-12-14 DIAGNOSIS — E11.42 DIABETIC PERIPHERAL NEUROPATHY: ICD-10-CM

## 2017-12-14 DIAGNOSIS — M54.16 BILATERAL LUMBAR RADICULOPATHY: ICD-10-CM

## 2017-12-14 DIAGNOSIS — G89.29 CHRONIC LOW BACK PAIN, UNSPECIFIED BACK PAIN LATERALITY, WITH SCIATICA PRESENCE UNSPECIFIED: ICD-10-CM

## 2017-12-14 NOTE — TELEPHONE ENCOUNTER
----- Message from Osbaldo Eng sent at 12/14/2017  2:30 PM CST -----  Contact: Pt  Pt calling to request a refill on Butalbital/Acetaminophen-Caff Tablet.    Pt would like it to be sent in [id number] - [place], [place] - [address] AT [facility]          Call back number: [phone]

## 2017-12-18 DIAGNOSIS — R11.0 NAUSEA: ICD-10-CM

## 2017-12-19 RX ORDER — ONDANSETRON 4 MG/1
TABLET, FILM COATED ORAL
Qty: 30 TABLET | Refills: 0 | Status: SHIPPED | OUTPATIENT
Start: 2017-12-19 | End: 2018-03-27 | Stop reason: SDUPTHER

## 2017-12-20 RX ORDER — SYRINGE,SAFETY WITH NEEDLE,1ML 25GX1"
SYRINGE (EA) MISCELLANEOUS
Qty: 400 EACH | Refills: 0 | Status: SHIPPED | OUTPATIENT
Start: 2017-12-20 | End: 2018-06-11 | Stop reason: CLARIF

## 2017-12-21 ENCOUNTER — OFFICE VISIT (OUTPATIENT)
Dept: PHYSICAL MEDICINE AND REHAB | Facility: CLINIC | Age: 67
End: 2017-12-21
Payer: MEDICARE

## 2017-12-21 ENCOUNTER — LAB VISIT (OUTPATIENT)
Dept: LAB | Facility: HOSPITAL | Age: 67
End: 2017-12-21
Attending: PHYSICAL MEDICINE & REHABILITATION
Payer: MEDICARE

## 2017-12-21 VITALS
SYSTOLIC BLOOD PRESSURE: 130 MMHG | HEIGHT: 68 IN | DIASTOLIC BLOOD PRESSURE: 51 MMHG | WEIGHT: 262.88 LBS | BODY MASS INDEX: 39.84 KG/M2 | HEART RATE: 61 BPM

## 2017-12-21 DIAGNOSIS — M54.50 CHRONIC BILATERAL LOW BACK PAIN WITHOUT SCIATICA: ICD-10-CM

## 2017-12-21 DIAGNOSIS — E11.42 DIABETIC PERIPHERAL NEUROPATHY: ICD-10-CM

## 2017-12-21 DIAGNOSIS — M54.2 CHRONIC NECK PAIN: ICD-10-CM

## 2017-12-21 DIAGNOSIS — M54.16 LUMBAR RADICULOPATHY: ICD-10-CM

## 2017-12-21 DIAGNOSIS — M46.92 SPONDYLITIS, CERVICAL: ICD-10-CM

## 2017-12-21 DIAGNOSIS — G56.01 CARPAL TUNNEL SYNDROME OF RIGHT WRIST: ICD-10-CM

## 2017-12-21 DIAGNOSIS — G89.29 CHRONIC LOW BACK PAIN WITH SCIATICA, SCIATICA LATERALITY UNSPECIFIED, UNSPECIFIED BACK PAIN LATERALITY: Primary | ICD-10-CM

## 2017-12-21 DIAGNOSIS — G89.29 CHRONIC NECK PAIN: ICD-10-CM

## 2017-12-21 DIAGNOSIS — R51.9 NONINTRACTABLE HEADACHE, UNSPECIFIED CHRONICITY PATTERN, UNSPECIFIED HEADACHE TYPE: ICD-10-CM

## 2017-12-21 DIAGNOSIS — G89.29 CHRONIC BILATERAL LOW BACK PAIN WITHOUT SCIATICA: ICD-10-CM

## 2017-12-21 DIAGNOSIS — M54.40 CHRONIC LOW BACK PAIN WITH SCIATICA, SCIATICA LATERALITY UNSPECIFIED, UNSPECIFIED BACK PAIN LATERALITY: ICD-10-CM

## 2017-12-21 DIAGNOSIS — Z79.891 LONG-TERM CURRENT USE OF OPIATE ANALGESIC: ICD-10-CM

## 2017-12-21 DIAGNOSIS — M19.041 PRIMARY OSTEOARTHRITIS OF RIGHT HAND: ICD-10-CM

## 2017-12-21 DIAGNOSIS — G89.29 CHRONIC LOW BACK PAIN WITH SCIATICA, SCIATICA LATERALITY UNSPECIFIED, UNSPECIFIED BACK PAIN LATERALITY: ICD-10-CM

## 2017-12-21 DIAGNOSIS — M54.40 CHRONIC LOW BACK PAIN WITH SCIATICA, SCIATICA LATERALITY UNSPECIFIED, UNSPECIFIED BACK PAIN LATERALITY: Primary | ICD-10-CM

## 2017-12-21 DIAGNOSIS — M50.30 DDD (DEGENERATIVE DISC DISEASE), CERVICAL: ICD-10-CM

## 2017-12-21 LAB
AMPHET+METHAMPHET UR QL: NEGATIVE
BARBITURATES UR QL SCN>200 NG/ML: NEGATIVE
BENZODIAZ UR QL SCN>200 NG/ML: NEGATIVE
BZE UR QL SCN: NEGATIVE
CANNABINOIDS UR QL SCN: NEGATIVE
CREAT UR-MCNC: 40 MG/DL
ETHANOL UR-MCNC: <10 MG/DL
METHADONE UR QL SCN>300 NG/ML: NEGATIVE
OPIATES UR QL SCN: NORMAL
PCP UR QL SCN>25 NG/ML: NEGATIVE
TOXICOLOGY INFORMATION: NORMAL

## 2017-12-21 PROCEDURE — 99999 PR PBB SHADOW E&M-EST. PATIENT-LVL III: CPT | Mod: PBBFAC,,, | Performed by: PHYSICAL MEDICINE & REHABILITATION

## 2017-12-21 PROCEDURE — 80307 DRUG TEST PRSMV CHEM ANLYZR: CPT

## 2017-12-21 PROCEDURE — 99213 OFFICE O/P EST LOW 20 MIN: CPT | Mod: PBBFAC | Performed by: PHYSICAL MEDICINE & REHABILITATION

## 2017-12-21 PROCEDURE — 99214 OFFICE O/P EST MOD 30 MIN: CPT | Mod: S$PBB,,, | Performed by: PHYSICAL MEDICINE & REHABILITATION

## 2017-12-21 RX ORDER — BUTALBITAL, ACETAMINOPHEN AND CAFFEINE 50; 325; 40 MG/1; MG/1; MG/1
1 TABLET ORAL EVERY 8 HOURS PRN
Qty: 60 TABLET | Refills: 0 | OUTPATIENT
Start: 2017-12-21 | End: 2018-01-20

## 2017-12-21 RX ORDER — BUTALBITAL, ACETAMINOPHEN AND CAFFEINE 50; 325; 40 MG/1; MG/1; MG/1
1 TABLET ORAL EVERY 4 HOURS PRN
Qty: 90 TABLET | Refills: 2 | Status: SHIPPED | OUTPATIENT
Start: 2017-12-21 | End: 2018-06-21 | Stop reason: SDUPTHER

## 2017-12-21 RX ORDER — BUTALBITAL, ACETAMINOPHEN AND CAFFEINE 50; 325; 40 MG/1; MG/1; MG/1
1 TABLET ORAL EVERY 4 HOURS PRN
Qty: 90 TABLET | Refills: 2 | Status: SHIPPED | OUTPATIENT
Start: 2017-12-21 | End: 2017-12-21 | Stop reason: SDUPTHER

## 2017-12-21 RX ORDER — HYDROCODONE BITARTRATE AND ACETAMINOPHEN 10; 325 MG/1; MG/1
1 TABLET ORAL EVERY 4 HOURS PRN
Qty: 120 TABLET | Refills: 0 | Status: SHIPPED | OUTPATIENT
Start: 2018-02-21 | End: 2018-03-21 | Stop reason: SDUPTHER

## 2017-12-21 RX ORDER — HYDROCODONE BITARTRATE AND ACETAMINOPHEN 10; 325 MG/1; MG/1
1 TABLET ORAL EVERY 4 HOURS PRN
Qty: 120 TABLET | Refills: 0 | Status: SHIPPED | OUTPATIENT
Start: 2017-12-21 | End: 2018-01-20

## 2017-12-21 RX ORDER — HYDROCODONE BITARTRATE AND ACETAMINOPHEN 10; 325 MG/1; MG/1
1 TABLET ORAL EVERY 4 HOURS PRN
Qty: 120 TABLET | Refills: 0 | Status: SHIPPED | OUTPATIENT
Start: 2018-01-20 | End: 2018-02-15

## 2017-12-21 NOTE — PROGRESS NOTES
""This note will not be shared with the patient."Subjective:       Patient ID: Vinh Castro is a 67 y.o. male.    Chief Complaint: Back Pain and Headache    Hip Pain      Back Pain   Associated symptoms include headaches and leg pain. Pertinent negatives include no abdominal pain, chest pain or fever.   Hand Pain    Pertinent negatives include no chest pain.   Neck Pain    Associated symptoms include headaches and leg pain. Pertinent negatives include no chest pain, fever or trouble swallowing.   Leg Pain      Shoulder Pain    Associated symptoms include headaches. Pertinent negatives include no fever.   Headache    Associated symptoms include back pain and neck pain. Pertinent negatives include no abdominal pain, dizziness, eye pain or fever.      Subjective:       Patient ID: Vinh Castro is a 67 y.o. male.    Chief Complaint: Back Pain and Headache  Mr. Castro is 68 y/o male who returnsto clinic for chronic back pain, and HA.    LCV 09/22/17.  He reports improvement of Back pain since LCV.  Today, he states that beside back pain, he is getting a significant HA,   That feels as tension headache ( around head).  They are not predictable, more frequent lately, starting at back and go around head.   It is a severe, intense pain, mainly during day time.  No associated symptoms, no N/V, no aura, no eye problems, nor balance nor gait problems.   He was given Fioricet in past and that seemed to help more than Hydrocodone.     Today he reports:  #1 Back pain.  Current low back pain is 6/10, the worst pain 8/10.   Back pain is now localized in Rt buttock, and pain does not run any more down the RT leg.  Pain in Rt buttock, he describes as disabling, and intense sharp, shooting pain down the leg, and also as a  burning pain.   He cannot to bend leg , it hurts to wear a shoes, he does that in sitting position, but he cannot bend Rt leg to place a shoe on foot.  He has diabetic polyneuropathy.  He reports that back pain is " off/on,shooting and  burning pain.   Sitting, makes his back pain worse, lying down improves the pain.  Also he has a pain with walking, able to walk  ~ 1 blocks.  No true weakness in legs, no numbness, paraesthesias.  No BB incontinence., no saddle anesthesia.  He takes Gabapentin 600 mg po QID, that is helping   Tolerates well Hydrocodone,and wants to continue the same medications.   He completed Physical therapy, and now doing exercises at home.   Here for follow up, and chronic pain management with opiates.    Past Medical History:   Diagnosis Date    *Atrial fibrillation     Acquired hypothyroidism 5/4/2016    Allergy     Anemia     Anticoagulant long-term use     Arthritis     CAD (coronary artery disease)     CABG 2002, PCI    CHF (congestive heart failure)     Chronic anticoagulation     Cirrhosis of liver 01/08/2017    Coronary artery disease     Depression     Diabetic nephropathy     Diabetic retinopathy associated with type 2 diabetes mellitus     Diverticulosis     Glaucoma     patient denies having glaucoma    History of BPH     History of peripheral vascular disease     Bilateral Carotid Artery Stenosis < 40% on Carotid US 7/2012    Hyperlipidemia     Hypertension     Obesity     SEAN (obstructive sleep apnea)     uses C-PAP    Pancreatic insufficiency     Personal history of kidney stones     PN (peripheral neuropathy)     Syncope 5/23/2016    Type 2 diabetes mellitus not at goal        Past Surgical History:   Procedure Laterality Date    ABDOMINAL SURGERY      CATARACT EXTRACTION BILATERAL W/ ANTERIOR VITRECTOMY      COLONOSCOPY      COLONOSCOPY N/A 7/21/2017    Procedure: COLONOSCOPY;  Surgeon: Roberto Sotelo MD;  Location: [facility] ([address] [place] [address]);  Service: Endoscopy;  Laterality: N/A;  pt requesting ASAP. hx of cirrhosis-labs ordered prior       Per protocol, pt does not have history of stroke/TIA. Ok to hold eliquis x 48 hours prior to procedure-per Sonya Quezada,  RN-(arrythmia clinic)         CORONARY ANGIOPLASTY  2014    Patent LIMA to LAD, SVG to OM, occluded LAD, 60% Circ-x1cv stent placed    CORONARY ARTERY BYPASS GRAFT  2001    iyer LAD SVG OM1    EYE SURGERY      retina repair    GALLBLADDER SURGERY      HAND SURGERY      LOST THREE FINGERS/left 1979    HERNIA REPAIR      JOINT REPLACEMENT      RADIOFREQUENCY ABLATION  8/14    atrial flutter    RETINAL LASER PROCEDURE      SKIN BIOPSY      head    TONSILLECTOMY      UPPER GASTROINTESTINAL ENDOSCOPY         Family History   Problem Relation Age of Onset    Cancer Mother      Rhumatoid lung disease    Diabetes Father     Heart attack Father     Heart disease Father     Cancer Father      Lymphona    Obesity Sister     Diabetes Paternal Grandfather     Anesthesia problems Neg Hx     Colon cancer Neg Hx     Esophageal cancer Neg Hx        Social History     Social History    Marital status:      Spouse name: Sandra    Number of children: 1    Years of education: N/A     Social History Main Topics    Smoking status: Never Smoker    Smokeless tobacco: Never Used    Alcohol use No    Drug use: No    Sexual activity: No     Other Topics Concern    None     Social History Narrative    Work: Manager at [employer] activity: None.Hobbies: Fishing.       Current Outpatient Prescriptions   Medication Sig Dispense Refill     insulin regular U-500 concentrated (HUMULIN R U-500, CONCENTRATED,) 500 unit/mL Soln 32-33 unit edgar 30 minutes before meals and night snack (Patient taking differently: Inject 34 Units into the skin. 34 unit edgar 30 minutes before meals  3x/day) 60 mL 5    alprazolam (XANAX) 0.5 MG tablet Take 1 tablet (0.5 mg total) by mouth daily as needed. 30 tablet 0    aspirin 81 MG Chew Take 81 mg by mouth once daily.      betamethasone dipropionate (DIPROLENE) 0.05 % ointment       blood sugar diagnostic (ACCU-CHEK JANEY PLUS TEST STRP) Strp Apply 1 strip topically 4 (four)  times daily. 600 strip 0    butalbital-acetaminophen-caffeine -40 mg (FIORICET, ESGIC) -40 mg per tablet Take 1 tablet by mouth every 4 (four) hours as needed for Pain or Headaches. 90 tablet 2    calcium carbonate-simethicone (MAALOX ADVANCED) 1,000-60 mg Chew Take 2 tablets by mouth 3 (three) times daily as needed.      CARAFATE 100 mg/mL suspension Take by mouth 3 (three) times daily.  5    CREON 24,000-76,000 -120,000 unit capsule TAKE 2 CAPSULES BY MOUTH THREE TIMES DAILY WITH MEALS AND 1 CAPSULE BY MOUTH WITH A SNACK 210 capsule 0    DEXTROSE (GLUCOSE) Chew Take by mouth as needed.      ELIQUIS 5 mg Tab TAKE 1 TABLET BY MOUTH TWICE DAILY 60 tablet 3    ferrous sulfate 325 mg (65 mg iron) Tab tablet Take 1 tablet (325 mg total) by mouth once daily. 90 tablet 1    fexofenadine (ALLEGRA) 180 MG tablet Take 180 mg by mouth once daily.      fluocinolone-shower cap 0.01 % Oil       fluticasone (FLONASE) 50 mcg/actuation nasal spray SHAKE WELL AND USE 2 SPRAYS IN EACH NOSTRIL EVERY DAY 1 Bottle 5    furosemide (LASIX) 40 MG tablet TAKE 1 TABLET(40 MG) BY MOUTH EVERY DAY 30 tablet 0    furosemide (LASIX) 80 MG tablet Take 1 tablet (80 mg total) by mouth once daily. 30 tablet 1    gabapentin (NEURONTIN) 600 MG tablet TAKE 1 TABLET(600 MG) BY MOUTH FOUR TIMES DAILY WITH MEALS 360 tablet 0    guaifenesin (MUCINEX) 600 mg 12 hr tablet Take 600 mg by mouth continuous prn for Congestion. Prn        hydrocodone-acetaminophen 10-325mg (NORCO)  mg Tab Take 1 tablet by mouth every 4 (four) hours as needed for Pain. 120 tablet 0    [START ON 1/20/2018] hydrocodone-acetaminophen 10-325mg (NORCO)  mg Tab Take 1 tablet by mouth every 4 (four) hours as needed for Pain. 120 tablet 0    [START ON 2/21/2018] hydrocodone-acetaminophen 10-325mg (NORCO)  mg Tab Take 1 tablet by mouth every 4 (four) hours as needed for Pain. 120 tablet 0    hydrOXYzine pamoate (VISTARIL) 25 MG Cap Take 1  capsule (25 mg total) by mouth every 8 (eight) hours as needed. 20 capsule 0    insulin syringe-needle U-100 1/2 mL 30 gauge x 5/16 Syrg USE WITH INSULIN THREE TIMES DAILY TO FOUR TIMES DAILY 400 each 0    isosorbide mononitrate (IMDUR) 30 MG 24 hr tablet TAKE ONE TABLET BY MOUTH ONCE DAILY 30 tablet 5    isosorbide mononitrate (IMDUR) 30 MG 24 hr tablet TAKE ONE TABLET BY MOUTH ONCE DAILY 30 tablet 0    isosorbide mononitrate (IMDUR) 30 MG 24 hr tablet TAKE ONE TABLET BY MOUTH ONCE DAILY 30 tablet 0    KRILL/OM3/DHA/EPA/OM6/LIP/ASTX (KRILL OIL, OMEGA 3 & 6, ORAL) Take 1 tablet by mouth every evening.       lancets (BD ULTRA FINE LANCETS) Misc 1 Units by Misc.(Non-Drug; Combo Route) route 4 (four) times daily. 200 each 11    levothyroxine (SYNTHROID) 25 MCG tablet Take 1 tablet (25 mcg total) by mouth before breakfast. 30 tablet 11    losartan (COZAAR) 50 MG tablet TAKE 1 TABLET BY MOUTH EVERY DAY 90 tablet 3    meloxicam (MOBIC) 15 MG tablet Take 1 tablet by mouth Daily.  5    metolazone (ZAROXOLYN) 5 MG tablet Take 1 tablet (5 mg total) by mouth daily as needed (Weight gain of 5 pounds or more). 30 tablet 6    metoprolol tartrate (LOPRESSOR) 50 MG tablet Take 1 tablet (50 mg total) by mouth 2 (two) times daily. 60 tablet 11    mupirocin (BACTROBAN) 2 % ointment APPLY 2 TO 3 APPLICATIONS ON THE SKIN D  1    MV-MN/FA/LYCOPENE/LUT/HB#178 (RAMEZ MULTIVITAMIN FOR MEN ORAL) Take by mouth.      NEVANAC 0.1 % ophthalmic suspension       nitroGLYCERIN (NITROSTAT) 0.4 MG SL tablet Place 1 tablet (0.4 mg total) under the tongue every 5 (five) minutes as needed. 25 tablet 6    ofloxacin (FLOXIN) 0.3 % otic solution Place 3 drops into both ears 2 (two) times daily.       olopatadine (PATADAY) 0.2 % Drop       ondansetron (ZOFRAN) 4 MG tablet TAKE 1 TABLET(4 MG) BY MOUTH EVERY 8 HOURS AS NEEDED FOR NAUSEA 30 tablet 0    oxymetazoline (MUCINEX SINUS-MAX) 0.05 % nasal spray 2 sprays by Nasal route once daily.       pantoprazole (PROTONIX) 40 MG tablet Take 1 tablet (40 mg total) by mouth 2 (two) times daily. 60 tablet 3    polyethylene glycol (GLYCOLAX) 17 gram/dose powder MIX AND DRINK 17 GRAMS BY MOUTH EVERY  g 0    simethicone (MYLICON) 125 MG chewable tablet Take 125 mg by mouth every 6 (six) hours as needed for Flatulence.      tamsulosin (FLOMAX) 0.4 mg Cp24 TAKE 1 CAPSULE BY MOUTH EVERY DAY 90 capsule 5    TOBRADEX 0.3-0.1 % Oint INSTILL 1 STRIP TO THE LEFT  EYE TID FOR 7 DAYS  0    TWINRIX, PF, 720 Cristy unit -20 mcg/mL Susp ADM 1ML IM UTD  0    zolpidem (AMBIEN) 5 MG Tab TAKE 1 TABLET BY MOUTH EVERY EVENING 30 tablet 0     No current facility-administered medications for this visit.        Review of patient's allergies indicates:   Allergen Reactions    Cephalexin Hives and Shortness Of Breath    Penicillins Other (See Comments)     Unknown  Hardened skin    Pravastatin Other (See Comments)     myalgia    Sulfa (sulfonamide antibiotics)      Other reaction(s): Unknown    Adhesive Rash     Review of Systems   Constitutional: Negative for activity change, appetite change, chills, diaphoresis, fatigue, fever and unexpected weight change.   HENT: Negative for trouble swallowing and voice change.    Eyes: Negative for pain and visual disturbance.   Respiratory: Negative for chest tightness, shortness of breath and wheezing.    Cardiovascular: Negative for chest pain, palpitations and leg swelling.   Gastrointestinal: Negative for abdominal pain, constipation and diarrhea.   Genitourinary: Negative for difficulty urinating, frequency and urgency.   Musculoskeletal: Positive for back pain and neck pain. Negative for arthralgias, joint swelling, myalgias and neck stiffness.   Skin: Negative for rash and wound.   Neurological: Positive for headaches. Negative for dizziness, facial asymmetry, speech difficulty and light-headedness.   Hematological: Negative for adenopathy.   Psychiatric/Behavioral:  Negative for agitation, behavioral problems, confusion, decreased concentration, dysphoric mood and sleep disturbance.         Objective:      Physical Exam    GENERAL: The patient is alert, oriented, pleasant, obese.   MUSCULOSKELETAL:   Gait is normal, walks without cane, on wide basis, small steps, improved balance.  Cervical spine full range of motion in all planes.   Lumbar spine, decreased active range of motion in all planes, flexion to 90 degrees, with shooting,   Side bending and rotation to 20-25 degrees bilateral,    Positive facet loading bilateral, R>L.    Straight leg raising positive bilaterally.   Full range of motion in all joints x4 extremities.   Muscle strength 5/5 throughout x4 extremities.   No  joint laxity throughout x4 extremities.   Rt  hand  incomplete, weak, can bend all fingers but pinky and ring finger flexion is delayed.  NEUROLOGIC: Cranial nerves II through XII intact.   Deep tendon reflexes is normal, +2 in the upper and lower extremities bilaterally.   Muscle tone is normal.   Sensory is intact to light touch and pinprick throughout x 4 extremities.     Xray of Lumbar spine.   Lumbar spine demonstrates mild to moderate DJD. Marginal osteophytes seen at multiple levels.   Facet hypertrophy seen at the lower levels. Vertebral body heights are fairly well maintained.   Flexion-extension views show no evidence of instability     MRI of Lumbar spine showed ( 10/23/14):   L4-5: Circumferential disk bulge, bilateral facet arthropathy, and ligamentum flavum hypertrophy results in moderate bilateral neural foraminal narrowing    and no significant spinal canal narrowing.  L5-S1: Severe bilateral facet arthropathy and ligament flavum hypertrophy resulting in significant spinal canal or neural foraminal narrowing.   Impression:  Multilevel lumbar spondylosis as detailed above resulting in moderate bilateral neural foraminal narrowing at L4-5 and mild bilateral neural foraminal narrowing  at L3-4.    Assessment:           1. Chronic low back pain with sciatica, sciatica laterality unspecified, unspecified back pain laterality    2. Diabetic peripheral neuropathy    3. Lumbar radiculopathy    4. Long-term current use of opiate analgesic    5. Chronic neck pain    6. Chronic bilateral low back pain without sciatica    7. Carpal tunnel syndrome of right wrist    8. DDD (degenerative disc disease), cervical    9. Spondylitis, cervical    10. Primary osteoarthritis of right hand    11. Nonintractable headache, unspecified chronicity pattern, unspecified headache type        Plan:       Chronic low back pain with sciatica, sciatica laterality unspecified, unspecified back pain laterality  -     hydrocodone-acetaminophen 10-325mg (NORCO)  mg Tab; Take 1 tablet by mouth every 4 (four) hours as needed for Pain.  Dispense: 120 tablet; Refill: 0  -     hydrocodone-acetaminophen 10-325mg (NORCO)  mg Tab; Take 1 tablet by mouth every 4 (four) hours as needed for Pain.  Dispense: 120 tablet; Refill: 0  -     hydrocodone-acetaminophen 10-325mg (NORCO)  mg Tab; Take 1 tablet by mouth every 4 (four) hours as needed for Pain.  Dispense: 120 tablet; Refill: 0  -     Discontinue: butalbital-acetaminophen-caffeine -40 mg (FIORICET, ESGIC) -40 mg per tablet; Take 1 tablet by mouth every 4 (four) hours as needed for Pain or Headaches.  Dispense: 90 tablet; Refill: 2  -     butalbital-acetaminophen-caffeine -40 mg (FIORICET, ESGIC) -40 mg per tablet; Take 1 tablet by mouth every 4 (four) hours as needed for Pain or Headaches.  Dispense: 90 tablet; Refill: 2  -     Toxicology screen, urine; Future    Diabetic peripheral neuropathy  -     hydrocodone-acetaminophen 10-325mg (NORCO)  mg Tab; Take 1 tablet by mouth every 4 (four) hours as needed for Pain.  Dispense: 120 tablet; Refill: 0  -     hydrocodone-acetaminophen 10-325mg (NORCO)  mg Tab; Take 1 tablet by mouth every 4  (four) hours as needed for Pain.  Dispense: 120 tablet; Refill: 0  -     hydrocodone-acetaminophen 10-325mg (NORCO)  mg Tab; Take 1 tablet by mouth every 4 (four) hours as needed for Pain.  Dispense: 120 tablet; Refill: 0  -     Discontinue: butalbital-acetaminophen-caffeine -40 mg (FIORICET, ESGIC) -40 mg per tablet; Take 1 tablet by mouth every 4 (four) hours as needed for Pain or Headaches.  Dispense: 90 tablet; Refill: 2  -     butalbital-acetaminophen-caffeine -40 mg (FIORICET, ESGIC) -40 mg per tablet; Take 1 tablet by mouth every 4 (four) hours as needed for Pain or Headaches.  Dispense: 90 tablet; Refill: 2  -     Toxicology screen, urine; Future    Lumbar radiculopathy  -     hydrocodone-acetaminophen 10-325mg (NORCO)  mg Tab; Take 1 tablet by mouth every 4 (four) hours as needed for Pain.  Dispense: 120 tablet; Refill: 0  -     hydrocodone-acetaminophen 10-325mg (NORCO)  mg Tab; Take 1 tablet by mouth every 4 (four) hours as needed for Pain.  Dispense: 120 tablet; Refill: 0  -     hydrocodone-acetaminophen 10-325mg (NORCO)  mg Tab; Take 1 tablet by mouth every 4 (four) hours as needed for Pain.  Dispense: 120 tablet; Refill: 0  -     Discontinue: butalbital-acetaminophen-caffeine -40 mg (FIORICET, ESGIC) -40 mg per tablet; Take 1 tablet by mouth every 4 (four) hours as needed for Pain or Headaches.  Dispense: 90 tablet; Refill: 2  -     butalbital-acetaminophen-caffeine -40 mg (FIORICET, ESGIC) -40 mg per tablet; Take 1 tablet by mouth every 4 (four) hours as needed for Pain or Headaches.  Dispense: 90 tablet; Refill: 2  -     Toxicology screen, urine; Future    Long-term current use of opiate analgesic  -     Discontinue: butalbital-acetaminophen-caffeine -40 mg (FIORICET, ESGIC) -40 mg per tablet; Take 1 tablet by mouth every 4 (four) hours as needed for Pain or Headaches.  Dispense: 90 tablet; Refill: 2  -      butalbital-acetaminophen-caffeine -40 mg (FIORICET, ESGIC) -40 mg per tablet; Take 1 tablet by mouth every 4 (four) hours as needed for Pain or Headaches.  Dispense: 90 tablet; Refill: 2  -     Toxicology screen, urine; Future    Chronic neck pain  -     hydrocodone-acetaminophen 10-325mg (NORCO)  mg Tab; Take 1 tablet by mouth every 4 (four) hours as needed for Pain.  Dispense: 120 tablet; Refill: 0  -     hydrocodone-acetaminophen 10-325mg (NORCO)  mg Tab; Take 1 tablet by mouth every 4 (four) hours as needed for Pain.  Dispense: 120 tablet; Refill: 0  -     hydrocodone-acetaminophen 10-325mg (NORCO)  mg Tab; Take 1 tablet by mouth every 4 (four) hours as needed for Pain.  Dispense: 120 tablet; Refill: 0  -     Discontinue: butalbital-acetaminophen-caffeine -40 mg (FIORICET, ESGIC) -40 mg per tablet; Take 1 tablet by mouth every 4 (four) hours as needed for Pain or Headaches.  Dispense: 90 tablet; Refill: 2  -     butalbital-acetaminophen-caffeine -40 mg (FIORICET, ESGIC) -40 mg per tablet; Take 1 tablet by mouth every 4 (four) hours as needed for Pain or Headaches.  Dispense: 90 tablet; Refill: 2  -     Toxicology screen, urine; Future    Chronic bilateral low back pain without sciatica  -     hydrocodone-acetaminophen 10-325mg (NORCO)  mg Tab; Take 1 tablet by mouth every 4 (four) hours as needed for Pain.  Dispense: 120 tablet; Refill: 0  -     hydrocodone-acetaminophen 10-325mg (NORCO)  mg Tab; Take 1 tablet by mouth every 4 (four) hours as needed for Pain.  Dispense: 120 tablet; Refill: 0  -     hydrocodone-acetaminophen 10-325mg (NORCO)  mg Tab; Take 1 tablet by mouth every 4 (four) hours as needed for Pain.  Dispense: 120 tablet; Refill: 0  -     Discontinue: butalbital-acetaminophen-caffeine -40 mg (FIORICET, ESGIC) -40 mg per tablet; Take 1 tablet by mouth every 4 (four) hours as needed for Pain or Headaches.  Dispense: 90  tablet; Refill: 2  -     butalbital-acetaminophen-caffeine -40 mg (FIORICET, ESGIC) -40 mg per tablet; Take 1 tablet by mouth every 4 (four) hours as needed for Pain or Headaches.  Dispense: 90 tablet; Refill: 2  -     Toxicology screen, urine; Future    Carpal tunnel syndrome of right wrist  -     hydrocodone-acetaminophen 10-325mg (NORCO)  mg Tab; Take 1 tablet by mouth every 4 (four) hours as needed for Pain.  Dispense: 120 tablet; Refill: 0  -     hydrocodone-acetaminophen 10-325mg (NORCO)  mg Tab; Take 1 tablet by mouth every 4 (four) hours as needed for Pain.  Dispense: 120 tablet; Refill: 0  -     hydrocodone-acetaminophen 10-325mg (NORCO)  mg Tab; Take 1 tablet by mouth every 4 (four) hours as needed for Pain.  Dispense: 120 tablet; Refill: 0  -     Discontinue: butalbital-acetaminophen-caffeine -40 mg (FIORICET, ESGIC) -40 mg per tablet; Take 1 tablet by mouth every 4 (four) hours as needed for Pain or Headaches.  Dispense: 90 tablet; Refill: 2  -     butalbital-acetaminophen-caffeine -40 mg (FIORICET, ESGIC) -40 mg per tablet; Take 1 tablet by mouth every 4 (four) hours as needed for Pain or Headaches.  Dispense: 90 tablet; Refill: 2  -     Toxicology screen, urine; Future    DDD (degenerative disc disease), cervical  -     hydrocodone-acetaminophen 10-325mg (NORCO)  mg Tab; Take 1 tablet by mouth every 4 (four) hours as needed for Pain.  Dispense: 120 tablet; Refill: 0  -     hydrocodone-acetaminophen 10-325mg (NORCO)  mg Tab; Take 1 tablet by mouth every 4 (four) hours as needed for Pain.  Dispense: 120 tablet; Refill: 0  -     hydrocodone-acetaminophen 10-325mg (NORCO)  mg Tab; Take 1 tablet by mouth every 4 (four) hours as needed for Pain.  Dispense: 120 tablet; Refill: 0  -     Discontinue: butalbital-acetaminophen-caffeine -40 mg (FIORICET, ESGIC) -40 mg per tablet; Take 1 tablet by mouth every 4 (four) hours as  needed for Pain or Headaches.  Dispense: 90 tablet; Refill: 2  -     butalbital-acetaminophen-caffeine -40 mg (FIORICET, ESGIC) -40 mg per tablet; Take 1 tablet by mouth every 4 (four) hours as needed for Pain or Headaches.  Dispense: 90 tablet; Refill: 2  -     Toxicology screen, urine; Future    Spondylitis, cervical  -     hydrocodone-acetaminophen 10-325mg (NORCO)  mg Tab; Take 1 tablet by mouth every 4 (four) hours as needed for Pain.  Dispense: 120 tablet; Refill: 0  -     hydrocodone-acetaminophen 10-325mg (NORCO)  mg Tab; Take 1 tablet by mouth every 4 (four) hours as needed for Pain.  Dispense: 120 tablet; Refill: 0  -     hydrocodone-acetaminophen 10-325mg (NORCO)  mg Tab; Take 1 tablet by mouth every 4 (four) hours as needed for Pain.  Dispense: 120 tablet; Refill: 0  -     Discontinue: butalbital-acetaminophen-caffeine -40 mg (FIORICET, ESGIC) -40 mg per tablet; Take 1 tablet by mouth every 4 (four) hours as needed for Pain or Headaches.  Dispense: 90 tablet; Refill: 2  -     butalbital-acetaminophen-caffeine -40 mg (FIORICET, ESGIC) -40 mg per tablet; Take 1 tablet by mouth every 4 (four) hours as needed for Pain or Headaches.  Dispense: 90 tablet; Refill: 2  -     Toxicology screen, urine; Future    Primary osteoarthritis of right hand  -     hydrocodone-acetaminophen 10-325mg (NORCO)  mg Tab; Take 1 tablet by mouth every 4 (four) hours as needed for Pain.  Dispense: 120 tablet; Refill: 0  -     hydrocodone-acetaminophen 10-325mg (NORCO)  mg Tab; Take 1 tablet by mouth every 4 (four) hours as needed for Pain.  Dispense: 120 tablet; Refill: 0  -     hydrocodone-acetaminophen 10-325mg (NORCO)  mg Tab; Take 1 tablet by mouth every 4 (four) hours as needed for Pain.  Dispense: 120 tablet; Refill: 0  -     Discontinue: butalbital-acetaminophen-caffeine -40 mg (FIORICET, ESGIC) -40 mg per tablet; Take 1 tablet by mouth every  4 (four) hours as needed for Pain or Headaches.  Dispense: 90 tablet; Refill: 2  -     butalbital-acetaminophen-caffeine -40 mg (FIORICET, ESGIC) -40 mg per tablet; Take 1 tablet by mouth every 4 (four) hours as needed for Pain or Headaches.  Dispense: 90 tablet; Refill: 2  -     Toxicology screen, urine; Future    Nonintractable headache, unspecified chronicity pattern, unspecified headache type  -     Discontinue: butalbital-acetaminophen-caffeine -40 mg (FIORICET, ESGIC) -40 mg per tablet; Take 1 tablet by mouth every 4 (four) hours as needed for Pain or Headaches.  Dispense: 90 tablet; Refill: 2  -     butalbital-acetaminophen-caffeine -40 mg (FIORICET, ESGIC) -40 mg per tablet; Take 1 tablet by mouth every 4 (four) hours as needed for Pain or Headaches.  Dispense: 90 tablet; Refill: 2  -     Toxicology screen, urine; Future    Patient with worsening of chronic back pain, secondary to lumbar spondylosis, facet arthropathy, and possible Rt L5-S1 lumbar radiculopathy.  Also with worsening of Headache that is responding to Fioricet well.    1. Pain management:   Hydrocodone 10/325 mg po q6 hrs prn pain, decreased monthly and daily dose from #150/months or 5 tabs/day down to #120 ( max 4 tabs/day) with 2 refills.  He is not happy but agreeable  Will resume  Neurontin 600 mg po tid- qid if tolerated ( one in am / noon/ at 2 at bedtime).    2. Headache, seems to be tension headache,   Fioricet given prn.     3. Safe opiate management:  Opiate risk tool completed, moderate risk level , at 6.   reviewed and appropriate.   Urine drug screen ordered.      Follow up in 3 months.     Total time spent face to face with patient was 25 minutes.   More than 50% of that time was spent in counseling on diagnosis , prognosis and treatment options.   I also  patient  on common and most usual side effect of prescribed medications. Risk and benefits of opiates, possible risk of developing  opiate dependence and tolerance, need of strict compliance with prescribed medications.  Pain contract, rules and obligations were discussed with patient in details.  He is aware that I would be the only doctor prescribing him pain medications and ED in a case of emergency.  I reviewed Primary care , and other specialty's notes to better coordinate patient's  care.   All questions were answered, and patient voiced understanding.

## 2017-12-22 RX ORDER — ISOSORBIDE MONONITRATE 30 MG/1
TABLET, EXTENDED RELEASE ORAL
Qty: 30 TABLET | Refills: 0 | Status: SHIPPED | OUTPATIENT
Start: 2017-12-22 | End: 2018-02-15

## 2017-12-27 RX ORDER — INSULIN HUMAN 500 [IU]/ML
INJECTION, SOLUTION SUBCUTANEOUS
Qty: 60 ML | Refills: 0 | OUTPATIENT
Start: 2017-12-27

## 2017-12-27 NOTE — TELEPHONE ENCOUNTER
----- Message from Noemi Putnam sent at 12/27/2017 11:36 AM CST -----  Contact: Self [phone]  .Refill request.  insulin regular U-500 concentrated (HUMULIN R U-500, CONCENTRATED,) 500 unit/mL Soln    ..  [id number] - [place], [place] - [address] AT [address] [place] [address]  Phone: [phone] Fax: [phone]

## 2018-01-08 RX ORDER — POLYETHYLENE GLYCOL 3350 17 G/17G
POWDER, FOR SOLUTION ORAL
Qty: 527 G | Refills: 0 | Status: SHIPPED | OUTPATIENT
Start: 2018-01-08 | End: 2018-02-21 | Stop reason: SDUPTHER

## 2018-01-15 DIAGNOSIS — K86.81 EXOCRINE PANCREATIC INSUFFICIENCY: ICD-10-CM

## 2018-01-15 RX ORDER — PANCRELIPASE 24000; 76000; 120000 [USP'U]/1; [USP'U]/1; [USP'U]/1
CAPSULE, DELAYED RELEASE PELLETS ORAL
Qty: 210 CAPSULE | Refills: 0 | Status: SHIPPED | OUTPATIENT
Start: 2018-01-15 | End: 2018-02-20 | Stop reason: SDUPTHER

## 2018-01-19 DIAGNOSIS — I50.30 HEART FAILURE WITH PRESERVED LEFT VENTRICULAR FUNCTION (HFPEF): ICD-10-CM

## 2018-01-19 RX ORDER — FUROSEMIDE 80 MG/1
TABLET ORAL
Qty: 30 TABLET | Refills: 0 | Status: SHIPPED | OUTPATIENT
Start: 2018-01-19 | End: 2018-02-20 | Stop reason: SDUPTHER

## 2018-01-23 RX ORDER — ISOSORBIDE MONONITRATE 30 MG/1
TABLET, EXTENDED RELEASE ORAL
Qty: 30 TABLET | Refills: 0 | Status: SHIPPED | OUTPATIENT
Start: 2018-01-23 | End: 2018-04-24 | Stop reason: SDUPTHER

## 2018-02-08 RX ORDER — POLYETHYLENE GLYCOL 3350 17 G/17G
POWDER, FOR SOLUTION ORAL
Qty: 527 G | Refills: 0 | Status: CANCELLED | OUTPATIENT
Start: 2018-02-08

## 2018-02-15 PROBLEM — I25.10 CAD (CORONARY ARTERY DISEASE): Status: ACTIVE | Noted: 2018-02-15

## 2018-02-20 DIAGNOSIS — I50.30 HEART FAILURE WITH PRESERVED LEFT VENTRICULAR FUNCTION (HFPEF): ICD-10-CM

## 2018-02-20 DIAGNOSIS — K86.81 EXOCRINE PANCREATIC INSUFFICIENCY: ICD-10-CM

## 2018-02-20 RX ORDER — PANCRELIPASE 24000; 76000; 120000 [USP'U]/1; [USP'U]/1; [USP'U]/1
CAPSULE, DELAYED RELEASE PELLETS ORAL
Qty: 210 CAPSULE | Refills: 0 | Status: SHIPPED | OUTPATIENT
Start: 2018-02-20 | End: 2018-03-26 | Stop reason: SDUPTHER

## 2018-02-20 RX ORDER — FUROSEMIDE 80 MG/1
TABLET ORAL
Qty: 30 TABLET | Refills: 0 | Status: SHIPPED | OUTPATIENT
Start: 2018-02-20 | End: 2018-03-26 | Stop reason: SDUPTHER

## 2018-02-21 RX ORDER — ISOSORBIDE MONONITRATE 30 MG/1
TABLET, EXTENDED RELEASE ORAL
Qty: 30 TABLET | Refills: 0 | Status: SHIPPED | OUTPATIENT
Start: 2018-02-21 | End: 2018-03-22

## 2018-02-21 RX ORDER — POLYETHYLENE GLYCOL 3350 17 G/17G
POWDER, FOR SOLUTION ORAL
Qty: 527 G | Refills: 3 | Status: SHIPPED | OUTPATIENT
Start: 2018-02-21 | End: 2018-08-19 | Stop reason: SDUPTHER

## 2018-02-21 NOTE — TELEPHONE ENCOUNTER
----- Message from Rossy Haley sent at 2/21/2018  8:41 AM CST -----  Contact: PT  Needs a refill on a powder to make him go to the bathroom(not sure of the name of it)    [facility]     [phone]

## 2018-03-20 RX ORDER — ISOSORBIDE MONONITRATE 30 MG/1
TABLET, EXTENDED RELEASE ORAL
Qty: 30 TABLET | Refills: 0 | Status: SHIPPED | OUTPATIENT
Start: 2018-03-20 | End: 2018-03-22

## 2018-03-21 ENCOUNTER — OFFICE VISIT (OUTPATIENT)
Dept: PHYSICAL MEDICINE AND REHAB | Facility: CLINIC | Age: 68
End: 2018-03-21
Payer: MEDICARE

## 2018-03-21 VITALS
DIASTOLIC BLOOD PRESSURE: 53 MMHG | BODY MASS INDEX: 40.12 KG/M2 | SYSTOLIC BLOOD PRESSURE: 133 MMHG | WEIGHT: 264.75 LBS | HEART RATE: 58 BPM | HEIGHT: 68 IN

## 2018-03-21 DIAGNOSIS — G89.29 CHRONIC NECK PAIN: ICD-10-CM

## 2018-03-21 DIAGNOSIS — M54.50 CHRONIC BILATERAL LOW BACK PAIN WITHOUT SCIATICA: ICD-10-CM

## 2018-03-21 DIAGNOSIS — G89.29 CHRONIC LOW BACK PAIN WITH SCIATICA, SCIATICA LATERALITY UNSPECIFIED, UNSPECIFIED BACK PAIN LATERALITY: Primary | ICD-10-CM

## 2018-03-21 DIAGNOSIS — G56.01 CARPAL TUNNEL SYNDROME OF RIGHT WRIST: ICD-10-CM

## 2018-03-21 DIAGNOSIS — M46.92 SPONDYLITIS, CERVICAL: ICD-10-CM

## 2018-03-21 DIAGNOSIS — M50.30 DDD (DEGENERATIVE DISC DISEASE), CERVICAL: ICD-10-CM

## 2018-03-21 DIAGNOSIS — Z79.891 LONG-TERM CURRENT USE OF OPIATE ANALGESIC: ICD-10-CM

## 2018-03-21 DIAGNOSIS — M54.16 LUMBAR RADICULOPATHY: ICD-10-CM

## 2018-03-21 DIAGNOSIS — M19.041 PRIMARY OSTEOARTHRITIS OF RIGHT HAND: ICD-10-CM

## 2018-03-21 DIAGNOSIS — G89.29 CHRONIC BILATERAL LOW BACK PAIN WITHOUT SCIATICA: ICD-10-CM

## 2018-03-21 DIAGNOSIS — E11.42 DIABETIC PERIPHERAL NEUROPATHY: ICD-10-CM

## 2018-03-21 DIAGNOSIS — M54.40 CHRONIC LOW BACK PAIN WITH SCIATICA, SCIATICA LATERALITY UNSPECIFIED, UNSPECIFIED BACK PAIN LATERALITY: Primary | ICD-10-CM

## 2018-03-21 DIAGNOSIS — M54.2 CHRONIC NECK PAIN: ICD-10-CM

## 2018-03-21 PROCEDURE — 99999 PR PBB SHADOW E&M-EST. PATIENT-LVL III: CPT | Mod: PBBFAC,,, | Performed by: PHYSICAL MEDICINE & REHABILITATION

## 2018-03-21 PROCEDURE — 99213 OFFICE O/P EST LOW 20 MIN: CPT | Mod: PBBFAC | Performed by: PHYSICAL MEDICINE & REHABILITATION

## 2018-03-21 PROCEDURE — 99214 OFFICE O/P EST MOD 30 MIN: CPT | Mod: S$PBB,,, | Performed by: PHYSICAL MEDICINE & REHABILITATION

## 2018-03-21 RX ORDER — HYDROCODONE BITARTRATE AND ACETAMINOPHEN 10; 325 MG/1; MG/1
1 TABLET ORAL EVERY 4 HOURS PRN
Qty: 120 TABLET | Refills: 0 | Status: SHIPPED | OUTPATIENT
Start: 2018-05-21 | End: 2018-06-21 | Stop reason: SDUPTHER

## 2018-03-21 RX ORDER — HYDROCODONE BITARTRATE AND ACETAMINOPHEN 10; 325 MG/1; MG/1
1 TABLET ORAL EVERY 4 HOURS PRN
Qty: 120 TABLET | Refills: 0 | Status: SHIPPED | OUTPATIENT
Start: 2018-04-21 | End: 2018-05-21

## 2018-03-21 RX ORDER — HYDROCODONE BITARTRATE AND ACETAMINOPHEN 10; 325 MG/1; MG/1
1 TABLET ORAL EVERY 4 HOURS PRN
Qty: 120 TABLET | Refills: 0 | Status: SHIPPED | OUTPATIENT
Start: 2018-03-21 | End: 2018-04-20

## 2018-03-21 NOTE — PROGRESS NOTES
""This note will not be shared with the patient."Subjective:       Patient ID: Vinh Castro is a 67 y.o. male.    Chief Complaint: Back Pain    Back Pain   Associated symptoms include headaches and leg pain. Pertinent negatives include no abdominal pain, chest pain or fever.   Headache    Associated symptoms include back pain and neck pain. Pertinent negatives include no abdominal pain, dizziness, eye pain or fever.   Hip Pain      Hand Pain    Pertinent negatives include no chest pain.   Neck Pain    Associated symptoms include headaches and leg pain. Pertinent negatives include no chest pain, fever or trouble swallowing.   Leg Pain      Shoulder Pain    Associated symptoms include headaches. Pertinent negatives include no fever.      Subjective:       Patient ID: Vinh Castro is a 67 y.o. male.    Chief Complaint: Back Pain  Mr. Castro is 68 y/o male who returns to clinic for chronic back pain.    LCV 12/21/17.  He reports a signifincant Back pain, today.  NO new injury. Does not exercise, has sedentary lifestyle.  His HA somewhat improved on Fioricet, that helps more than Hydrocodone.     Today he reports:  #1 Back pain.  Current low back pain is 7/10, the worst pain 8/10.   Back pain is now localized in Rt buttock, and pain does not run any more down the RT leg.  Pain in Rt buttock, he describes as disabling, and intense sharp, shooting pain down the leg, and also as a  burning pain.   He cannot to bend leg , it hurts to wear a shoes, he does that in sitting position, but he cannot bend Rt leg to place a shoe on foot.  He has diabetic polyneuropathy.  He reports that back pain is off/on,shooting and  burning pain.   Sitting, makes his back pain worse, lying down improves the pain.  Also he has a pain with walking, able to walk  ~ 1 blocks.  No true weakness in legs, no numbness, paraesthesias.  No BB incontinence., no saddle anesthesia.  He takes Gabapentin 600 mg po QID, that is helping, and Hydrocodone 10/325 mg, " takes 4 tabs/day.  Tolerates well Hydrocodone,and wants to continue the same medications.   He had in past Physical therapy, but does not exercises at home.   Here for follow up, and chronic pain management with opiates.    Past Medical History:   Diagnosis Date    *Atrial fibrillation     Acquired hypothyroidism 5/4/2016    Allergy     Anemia     Anticoagulant long-term use     Arthritis     CAD (coronary artery disease)     CABG 2002, PCI    CHF (congestive heart failure)     Chronic anticoagulation     Cirrhosis of liver 01/08/2017    Coronary artery disease     Depression     Diabetic nephropathy     Diabetic retinopathy associated with type 2 diabetes mellitus     Diverticulosis     Glaucoma     patient denies having glaucoma    History of BPH     History of peripheral vascular disease     Bilateral Carotid Artery Stenosis < 40% on Carotid US 7/2012    Hyperlipidemia     Hypertension     Obesity     SEAN (obstructive sleep apnea)     uses C-PAP    Pancreatic insufficiency     Personal history of kidney stones     PN (peripheral neuropathy)     Syncope 5/23/2016    Type 2 diabetes mellitus not at goal        Past Surgical History:   Procedure Laterality Date    ABDOMINAL SURGERY      CATARACT EXTRACTION BILATERAL W/ ANTERIOR VITRECTOMY      COLONOSCOPY      COLONOSCOPY N/A 7/21/2017    Procedure: COLONOSCOPY;  Surgeon: Roberto Sotelo MD;  Location: [facility] ([address] [place] [address]);  Service: Endoscopy;  Laterality: N/A;  pt requesting ASAP. hx of cirrhosis-labs ordered prior       Per protocol, pt does not have history of stroke/TIA. Ok to hold eliquis x 48 hours prior to procedure-per Sonya Quezada RN-(arrythmia clinic)         CORONARY ANGIOPLASTY  2014    Patent LIMA to LAD, SVG to OM, occluded LAD, 60% Circ-x1cv stent placed    CORONARY ARTERY BYPASS GRAFT  2001    iyer LAD SVG OM1    EYE SURGERY      retina repair    GALLBLADDER SURGERY      HAND SURGERY      LOST THREE  FINGERS/left 1979    HERNIA REPAIR      JOINT REPLACEMENT      RADIOFREQUENCY ABLATION  8/14    atrial flutter    RETINAL LASER PROCEDURE      SKIN BIOPSY      head    TONSILLECTOMY      UPPER GASTROINTESTINAL ENDOSCOPY         Family History   Problem Relation Age of Onset    Cancer Mother      Rhumatoid lung disease    Diabetes Father     Heart attack Father     Heart disease Father     Cancer Father      Lymphona    Obesity Sister     Diabetes Paternal Grandfather     Anesthesia problems Neg Hx     Colon cancer Neg Hx     Esophageal cancer Neg Hx        Social History     Social History    Marital status:      Spouse name: Sandra    Number of children: 1    Years of education: N/A     Social History Main Topics    Smoking status: Never Smoker    Smokeless tobacco: Never Used    Alcohol use No    Drug use: No    Sexual activity: No     Other Topics Concern    None     Social History Narrative    Work: Manager at [employer] activity: None.Hobbies: Fishing.       Current Outpatient Prescriptions   Medication Sig Dispense Refill     insulin regular U-500 concentrated (HUMULIN R U-500, CONCENTRATED,) 500 unit/mL Soln 32-33 unit edgar 30 minutes before meals and night snack 60 mL 5    alprazolam (XANAX) 0.5 MG tablet Take 1 tablet (0.5 mg total) by mouth daily as needed. 30 tablet 0    apixaban (ELIQUIS) 5 mg Tab Take 1 tablet (5 mg total) by mouth 2 (two) times daily. 60 tablet 6    aspirin 81 MG Chew Take 81 mg by mouth once daily.      blood sugar diagnostic (ACCU-CHEK JANEY PLUS TEST STRP) Strp Apply 1 strip topically 6 (six) times daily. 600 strip 10    butalbital-acetaminophen-caffeine -40 mg (FIORICET, ESGIC) -40 mg per tablet Take 1 tablet by mouth every 4 (four) hours as needed for Pain or Headaches. 90 tablet 2    calcium carbonate-simethicone (MAALOX ADVANCED) 1,000-60 mg Chew Take 2 tablets by mouth 3 (three) times daily as needed.      CARAFATE 100  mg/mL suspension Take by mouth 3 (three) times daily.  5    cetirizine (ZYRTEC) 10 MG tablet TK 1 T PO QHS  0    CREON 24,000-76,000 -120,000 unit capsule TAKE 2 CAPSULES BY MOUTH THREE TIMES DAILY WITH MEALS AND 1 CAPSULE BY MOUTH WITH A SNACK 210 capsule 0    DEXTROSE (GLUCOSE) Chew Take by mouth as needed.      DULoxetine (CYMBALTA) 30 MG capsule Take 1 capsule (30 mg total) by mouth once daily. 30 capsule 11    ferrous sulfate 325 mg (65 mg iron) Tab tablet Take 1 tablet (325 mg total) by mouth once daily. 90 tablet 1    fexofenadine (ALLEGRA) 180 MG tablet Take 180 mg by mouth once daily.      fluocinolone-shower cap 0.01 % Oil       fluticasone (FLONASE) 50 mcg/actuation nasal spray SHAKE WELL AND USE 2 SPRAYS IN EACH NOSTRIL EVERY DAY (Patient taking differently: 2 sprays by Each Nare route once daily. ) 1 Bottle 5    furosemide (LASIX) 40 MG tablet TAKE 1 TABLET(40 MG) BY MOUTH EVERY DAY (Patient taking differently: 1 daily) 30 tablet 0    furosemide (LASIX) 80 MG tablet TAKE 1 TABLET BY MOUTH ONCE DAILY 30 tablet 0    gabapentin (NEURONTIN) 600 MG tablet TAKE 1 TABLET(600 MG) BY MOUTH FOUR TIMES DAILY WITH MEALS 360 tablet 0    guaifenesin (MUCINEX) 600 mg 12 hr tablet Take 600 mg by mouth continuous prn for Congestion. Prn        hydrocodone-acetaminophen 10-325mg (NORCO)  mg Tab Take 1 tablet by mouth every 4 (four) hours as needed for Pain. 120 tablet 0    [START ON 4/21/2018] hydrocodone-acetaminophen 10-325mg (NORCO)  mg Tab Take 1 tablet by mouth every 4 (four) hours as needed for Pain. 120 tablet 0    [START ON 5/21/2018] hydrocodone-acetaminophen 10-325mg (NORCO)  mg Tab Take 1 tablet by mouth every 4 (four) hours as needed for Pain. 120 tablet 0    hydrOXYzine pamoate (VISTARIL) 25 MG Cap Take 1 capsule (25 mg total) by mouth every 8 (eight) hours as needed. 20 capsule 0    insulin syringe-needle U-100 1/2 mL 30 gauge x 5/16 Syrg USE WITH INSULIN THREE TIMES DAILY  TO FOUR TIMES DAILY 400 each 0    isosorbide mononitrate (IMDUR) 30 MG 24 hr tablet TAKE ONE TABLET BY MOUTH ONCE DAILY 30 tablet 0    KRILL/OM3/DHA/EPA/OM6/LIP/ASTX (KRILL OIL, OMEGA 3 & 6, ORAL) Take 1 tablet by mouth every evening.       lancets (BD ULTRA FINE LANCETS) Misc 1 Units by Misc.(Non-Drug; Combo Route) route 4 (four) times daily. 200 each 11    levothyroxine (SYNTHROID) 25 MCG tablet Take 1 tablet (25 mcg total) by mouth before breakfast. 30 tablet 11    losartan (COZAAR) 50 MG tablet TAKE 1 TABLET BY MOUTH EVERY DAY 90 tablet 3    metolazone (ZAROXOLYN) 5 MG tablet Take 1 tablet (5 mg total) by mouth daily as needed (Weight gain of 5 pounds or more). 30 tablet 6    metoprolol tartrate (LOPRESSOR) 50 MG tablet Take 1 tablet (50 mg total) by mouth 2 (two) times daily. 60 tablet 11    mupirocin (BACTROBAN) 2 % ointment APPLY 2 TO 3 APPLICATIONS ON THE SKIN D  1    MV-MN/FA/LYCOPENE/LUT/HB#178 (RAMEZ MULTIVITAMIN FOR MEN ORAL) Take by mouth.      neomycin-polymyxin-hydrocortisone (CORTISPORIN) 3.5-10,000-1 mg/mL-unit/mL-% otic suspension INT 3 DROPS INTO RIGHT EAR BID PRN PAIN AND ITCHING  0    NEVANAC 0.1 % ophthalmic suspension       nitroGLYCERIN (NITROSTAT) 0.4 MG SL tablet Place 1 tablet (0.4 mg total) under the tongue every 5 (five) minutes as needed. 25 tablet 6    ofloxacin (FLOXIN) 0.3 % otic solution Place 3 drops into both ears 2 (two) times daily.       olopatadine (PATADAY) 0.2 % Drop       ondansetron (ZOFRAN) 4 MG tablet TAKE 1 TABLET(4 MG) BY MOUTH EVERY 8 HOURS AS NEEDED FOR NAUSEA 30 tablet 0    oxymetazoline (MUCINEX SINUS-MAX) 0.05 % nasal spray 2 sprays by Nasal route once daily.      pantoprazole (PROTONIX) 40 MG tablet Take 1 tablet (40 mg total) by mouth 2 (two) times daily. 60 tablet 3    polyethylene glycol (GLYCOLAX) 17 gram/dose powder MIX AND DRINK 17 GRAMS BY MOUTH EVERY  g 3    PROAIR HFA 90 mcg/actuation inhaler INHALE 1-2 PUFFS PO Q 4-6 H PRN COUGH   0    simethicone (MYLICON) 125 MG chewable tablet Take 125 mg by mouth every 6 (six) hours as needed for Flatulence.      tamsulosin (FLOMAX) 0.4 mg Cp24 TAKE 1 CAPSULE BY MOUTH EVERY DAY 90 capsule 5    triamcinolone acetonide 0.1% (KENALOG) 0.1 % cream APPLY 1 APPLICATION ON THE SKIN D  0    TWINRIX, PF, 720 Cristy unit -20 mcg/mL Susp ADM 1ML IM UTD  0     No current facility-administered medications for this visit.        Review of patient's allergies indicates:   Allergen Reactions    Cephalexin Hives and Shortness Of Breath    Penicillins Other (See Comments)     Unknown  Hardened skin    Pravastatin Other (See Comments)     myalgia    Sulfa (sulfonamide antibiotics)      Other reaction(s): Unknown    Adhesive Rash     Review of Systems   Constitutional: Negative for activity change, appetite change, chills, diaphoresis, fatigue, fever and unexpected weight change.   HENT: Negative for trouble swallowing and voice change.    Eyes: Negative for pain and visual disturbance.   Respiratory: Negative for chest tightness, shortness of breath and wheezing.    Cardiovascular: Negative for chest pain, palpitations and leg swelling.   Gastrointestinal: Negative for abdominal pain, constipation and diarrhea.   Genitourinary: Negative for difficulty urinating, frequency and urgency.   Musculoskeletal: Positive for back pain and neck pain. Negative for arthralgias, joint swelling, myalgias and neck stiffness.   Skin: Negative for rash and wound.   Neurological: Positive for headaches. Negative for dizziness, facial asymmetry, speech difficulty and light-headedness.   Hematological: Negative for adenopathy.   Psychiatric/Behavioral: Negative for agitation, behavioral problems, confusion, decreased concentration, dysphoric mood and sleep disturbance.         Objective:      Physical Exam    GENERAL: The patient is alert, oriented, pleasant, obese.   MUSCULOSKELETAL:   Gait is normal, walks without cane, on wide  basis, small steps, improved balance.  Cervical spine full range of motion in all planes.   Lumbar spine, decreased active range of motion in all planes, flexion to 90 degrees, with shooting,   Side bending and rotation to 20-25 degrees bilateral,    Positive facet loading bilateral, R>L.    Straight leg raising positive bilaterally.   Full range of motion in all joints x4 extremities.   Muscle strength 5/5 throughout x4 extremities.   No  joint laxity throughout x4 extremities.   Rt  hand  incomplete, weak, can bend all fingers but pinky and ring finger flexion is delayed.  NEUROLOGIC: Cranial nerves II through XII intact.   Deep tendon reflexes is normal, +2 in the upper and lower extremities bilaterally.   Muscle tone is normal.   Sensory is intact to light touch and pinprick throughout x 4 extremities.     Xray of Lumbar spine.   Lumbar spine demonstrates mild to moderate DJD. Marginal osteophytes seen at multiple levels.   Facet hypertrophy seen at the lower levels. Vertebral body heights are fairly well maintained.   Flexion-extension views show no evidence of instability     MRI of Lumbar spine showed ( 10/23/14):   L4-5: Circumferential disk bulge, bilateral facet arthropathy, and ligamentum flavum hypertrophy results in moderate bilateral neural foraminal narrowing    and no significant spinal canal narrowing.  L5-S1: Severe bilateral facet arthropathy and ligament flavum hypertrophy resulting in significant spinal canal or neural foraminal narrowing.   Impression:  Multilevel lumbar spondylosis as detailed above resulting in moderate bilateral neural foraminal narrowing at L4-5 and mild bilateral neural foraminal narrowing at L3-4.    Assessment:           1. Chronic low back pain with sciatica, sciatica laterality unspecified, unspecified back pain laterality    2. Lumbar radiculopathy    3. Diabetic peripheral neuropathy    4. Long-term current use of opiate analgesic    5. Chronic neck pain    6.  Chronic bilateral low back pain without sciatica    7. Carpal tunnel syndrome of right wrist    8. DDD (degenerative disc disease), cervical    9. Spondylitis, cervical    10. Primary osteoarthritis of right hand        Plan:       Chronic low back pain with sciatica, sciatica laterality unspecified, unspecified back pain laterality  -     hydrocodone-acetaminophen 10-325mg (NORCO)  mg Tab; Take 1 tablet by mouth every 4 (four) hours as needed for Pain.  Dispense: 120 tablet; Refill: 0  -     hydrocodone-acetaminophen 10-325mg (NORCO)  mg Tab; Take 1 tablet by mouth every 4 (four) hours as needed for Pain.  Dispense: 120 tablet; Refill: 0  -     hydrocodone-acetaminophen 10-325mg (NORCO)  mg Tab; Take 1 tablet by mouth every 4 (four) hours as needed for Pain.  Dispense: 120 tablet; Refill: 0    Lumbar radiculopathy  -     hydrocodone-acetaminophen 10-325mg (NORCO)  mg Tab; Take 1 tablet by mouth every 4 (four) hours as needed for Pain.  Dispense: 120 tablet; Refill: 0  -     hydrocodone-acetaminophen 10-325mg (NORCO)  mg Tab; Take 1 tablet by mouth every 4 (four) hours as needed for Pain.  Dispense: 120 tablet; Refill: 0  -     hydrocodone-acetaminophen 10-325mg (NORCO)  mg Tab; Take 1 tablet by mouth every 4 (four) hours as needed for Pain.  Dispense: 120 tablet; Refill: 0    Diabetic peripheral neuropathy  -     hydrocodone-acetaminophen 10-325mg (NORCO)  mg Tab; Take 1 tablet by mouth every 4 (four) hours as needed for Pain.  Dispense: 120 tablet; Refill: 0  -     hydrocodone-acetaminophen 10-325mg (NORCO)  mg Tab; Take 1 tablet by mouth every 4 (four) hours as needed for Pain.  Dispense: 120 tablet; Refill: 0  -     hydrocodone-acetaminophen 10-325mg (NORCO)  mg Tab; Take 1 tablet by mouth every 4 (four) hours as needed for Pain.  Dispense: 120 tablet; Refill: 0    Long-term current use of opiate analgesic  -     hydrocodone-acetaminophen 10-325mg (NORCO)   mg Tab; Take 1 tablet by mouth every 4 (four) hours as needed for Pain.  Dispense: 120 tablet; Refill: 0  -     hydrocodone-acetaminophen 10-325mg (NORCO)  mg Tab; Take 1 tablet by mouth every 4 (four) hours as needed for Pain.  Dispense: 120 tablet; Refill: 0  -     hydrocodone-acetaminophen 10-325mg (NORCO)  mg Tab; Take 1 tablet by mouth every 4 (four) hours as needed for Pain.  Dispense: 120 tablet; Refill: 0    Chronic neck pain  -     hydrocodone-acetaminophen 10-325mg (NORCO)  mg Tab; Take 1 tablet by mouth every 4 (four) hours as needed for Pain.  Dispense: 120 tablet; Refill: 0  -     hydrocodone-acetaminophen 10-325mg (NORCO)  mg Tab; Take 1 tablet by mouth every 4 (four) hours as needed for Pain.  Dispense: 120 tablet; Refill: 0  -     hydrocodone-acetaminophen 10-325mg (NORCO)  mg Tab; Take 1 tablet by mouth every 4 (four) hours as needed for Pain.  Dispense: 120 tablet; Refill: 0    Chronic bilateral low back pain without sciatica  -     hydrocodone-acetaminophen 10-325mg (NORCO)  mg Tab; Take 1 tablet by mouth every 4 (four) hours as needed for Pain.  Dispense: 120 tablet; Refill: 0  -     hydrocodone-acetaminophen 10-325mg (NORCO)  mg Tab; Take 1 tablet by mouth every 4 (four) hours as needed for Pain.  Dispense: 120 tablet; Refill: 0  -     hydrocodone-acetaminophen 10-325mg (NORCO)  mg Tab; Take 1 tablet by mouth every 4 (four) hours as needed for Pain.  Dispense: 120 tablet; Refill: 0    Carpal tunnel syndrome of right wrist  -     hydrocodone-acetaminophen 10-325mg (NORCO)  mg Tab; Take 1 tablet by mouth every 4 (four) hours as needed for Pain.  Dispense: 120 tablet; Refill: 0  -     hydrocodone-acetaminophen 10-325mg (NORCO)  mg Tab; Take 1 tablet by mouth every 4 (four) hours as needed for Pain.  Dispense: 120 tablet; Refill: 0  -     hydrocodone-acetaminophen 10-325mg (NORCO)  mg Tab; Take 1 tablet by mouth every 4 (four)  hours as needed for Pain.  Dispense: 120 tablet; Refill: 0    DDD (degenerative disc disease), cervical  -     hydrocodone-acetaminophen 10-325mg (NORCO)  mg Tab; Take 1 tablet by mouth every 4 (four) hours as needed for Pain.  Dispense: 120 tablet; Refill: 0  -     hydrocodone-acetaminophen 10-325mg (NORCO)  mg Tab; Take 1 tablet by mouth every 4 (four) hours as needed for Pain.  Dispense: 120 tablet; Refill: 0  -     hydrocodone-acetaminophen 10-325mg (NORCO)  mg Tab; Take 1 tablet by mouth every 4 (four) hours as needed for Pain.  Dispense: 120 tablet; Refill: 0    Spondylitis, cervical  -     hydrocodone-acetaminophen 10-325mg (NORCO)  mg Tab; Take 1 tablet by mouth every 4 (four) hours as needed for Pain.  Dispense: 120 tablet; Refill: 0  -     hydrocodone-acetaminophen 10-325mg (NORCO)  mg Tab; Take 1 tablet by mouth every 4 (four) hours as needed for Pain.  Dispense: 120 tablet; Refill: 0  -     hydrocodone-acetaminophen 10-325mg (NORCO)  mg Tab; Take 1 tablet by mouth every 4 (four) hours as needed for Pain.  Dispense: 120 tablet; Refill: 0    Primary osteoarthritis of right hand  -     hydrocodone-acetaminophen 10-325mg (NORCO)  mg Tab; Take 1 tablet by mouth every 4 (four) hours as needed for Pain.  Dispense: 120 tablet; Refill: 0  -     hydrocodone-acetaminophen 10-325mg (NORCO)  mg Tab; Take 1 tablet by mouth every 4 (four) hours as needed for Pain.  Dispense: 120 tablet; Refill: 0  -     hydrocodone-acetaminophen 10-325mg (NORCO)  mg Tab; Take 1 tablet by mouth every 4 (four) hours as needed for Pain.  Dispense: 120 tablet; Refill: 0    Patient with worsening of chronic back pain, secondary to lumbar spondylosis, facet arthropathy, and possible Rt L5-S1 lumbar radiculopathy.  Also with worsening of Headache that is responding to Fioricet well.    1. Pain management:   Hydrocodone 10/325 mg po q6 hrs prn pain, decreased monthly and daily dose from  #150/months or 5 tabs/day down to #120 ( max 4 tabs/day) with 2 refills.  He is not happy but agreeable.  We had an extended talk about further decrease in opioid therapy, and increase activity, to improve function.   Discussed eight loss, and core strengthening (water therapy, stationary biking and walking seems to be the best exercise for him).  Will resume  Neurontin 600 mg po tid- qid if tolerated ( one in am / noon/ at 2 at bedtime).    2. Safe opiate management:  Opiate risk tool completed, moderate risk level , at 6.   reviewed and appropriate.   Hydrocodone refills on 2/21/8, 1/31/18, 12/21/17.  Urine drug screen one on 12/21/17 ( + for opiates).        Follow up in 3 months.     Total time spent face to face with patient was 25 minutes.   More than 50% of that time was spent in counseling on diagnosis , prognosis and treatment options.   I also  patient  on common and most usual side effect of prescribed medications. Risk and benefits of opiates, possible risk of developing opiate dependence and tolerance, need of strict compliance with prescribed medications.  Pain contract, rules and obligations were discussed with patient in details.  He is aware that I would be the only doctor prescribing him pain medications and ED in a case of emergency.  I reviewed Primary care , and other specialty's notes to better coordinate patient's  care.   All questions were answered, and patient voiced understanding.

## 2018-03-22 PROBLEM — F33.1 MODERATE EPISODE OF RECURRENT MAJOR DEPRESSIVE DISORDER: Status: ACTIVE | Noted: 2018-03-22

## 2018-03-26 DIAGNOSIS — I50.30 HEART FAILURE WITH PRESERVED LEFT VENTRICULAR FUNCTION (HFPEF): ICD-10-CM

## 2018-03-26 DIAGNOSIS — K86.81 EXOCRINE PANCREATIC INSUFFICIENCY: ICD-10-CM

## 2018-03-26 RX ORDER — PANCRELIPASE 24000; 76000; 120000 [USP'U]/1; [USP'U]/1; [USP'U]/1
CAPSULE, DELAYED RELEASE PELLETS ORAL
Qty: 210 CAPSULE | Refills: 0 | Status: SHIPPED | OUTPATIENT
Start: 2018-03-26 | End: 2018-05-03 | Stop reason: SDUPTHER

## 2018-03-26 RX ORDER — FUROSEMIDE 80 MG/1
TABLET ORAL
Qty: 30 TABLET | Refills: 0 | Status: SHIPPED | OUTPATIENT
Start: 2018-03-26 | End: 2018-06-07 | Stop reason: SDUPTHER

## 2018-03-27 DIAGNOSIS — R11.0 NAUSEA: ICD-10-CM

## 2018-03-27 RX ORDER — ONDANSETRON 4 MG/1
TABLET, FILM COATED ORAL
Qty: 30 TABLET | Refills: 0 | Status: SHIPPED | OUTPATIENT
Start: 2018-03-27 | End: 2018-09-28 | Stop reason: SDUPTHER

## 2018-03-27 NOTE — TELEPHONE ENCOUNTER
I spoke to Dr Villareal we have never seen patient for diabetes. He sees Endocrinology. Patient has an upcoming appt with Dr. Pavon and needs to get his strips from her. Per Dr Villareal I have cancelled the refill for the test strips. Left pt a msg to call back

## 2018-03-27 NOTE — TELEPHONE ENCOUNTER
----- Message from Rossy Haley sent at 3/27/2018 10:12 AM CDT -----  Contact: Rossy yeung [facility]  On the script it says the patient test 6 times a day but on the form we put the patient test 6 times a day. Which one is it?    [phone]

## 2018-04-05 PROBLEM — E11.69 MIXED DIABETIC HYPERLIPIDEMIA ASSOCIATED WITH TYPE 2 DIABETES MELLITUS: Chronic | Status: ACTIVE | Noted: 2018-04-05

## 2018-04-05 PROBLEM — E78.2 MIXED DIABETIC HYPERLIPIDEMIA ASSOCIATED WITH TYPE 2 DIABETES MELLITUS: Chronic | Status: ACTIVE | Noted: 2018-04-05

## 2018-04-06 ENCOUNTER — TELEPHONE (OUTPATIENT)
Dept: PHYSICAL MEDICINE AND REHAB | Facility: CLINIC | Age: 68
End: 2018-04-06

## 2018-04-06 NOTE — TELEPHONE ENCOUNTER
----- Message from Luli Strong sent at 4/6/2018 11:45 AM CDT -----  Contact: Pt. [phone]  butalbital-acetaminophen-caffeine -40 mg (FIORICET, ESGIC) -40 mg per tablet refill request. The insurance company is requesting a PA.  Please contact the patient to discuss further.

## 2018-04-06 NOTE — TELEPHONE ENCOUNTER
I have called the patient three no answer so I don't know if the patient will need a new script or prior auth

## 2018-04-12 DIAGNOSIS — I10 HYPERTENSION, UNSPECIFIED TYPE: ICD-10-CM

## 2018-04-12 RX ORDER — LOSARTAN POTASSIUM 50 MG/1
50 TABLET ORAL DAILY
Qty: 90 TABLET | Refills: 3 | Status: SHIPPED | OUTPATIENT
Start: 2018-04-12 | End: 2018-10-09 | Stop reason: SDUPTHER

## 2018-04-12 RX ORDER — ISOSORBIDE MONONITRATE 30 MG/1
TABLET, EXTENDED RELEASE ORAL
Qty: 30 TABLET | Refills: 0 | OUTPATIENT
Start: 2018-04-12

## 2018-04-23 RX ORDER — ISOSORBIDE MONONITRATE 30 MG/1
TABLET, EXTENDED RELEASE ORAL
Qty: 30 TABLET | Refills: 0 | OUTPATIENT
Start: 2018-04-23

## 2018-04-24 ENCOUNTER — TELEPHONE (OUTPATIENT)
Dept: PHYSICAL MEDICINE AND REHAB | Facility: CLINIC | Age: 68
End: 2018-04-24

## 2018-04-24 NOTE — TELEPHONE ENCOUNTER
----- Message from Rhea Gaviria MD sent at 4/24/2018 12:23 AM CDT -----  Contact: Melissa ([facility]) @ [phone]  Can you please call pt and tell him that we cannot get medication approval since we do not have an appropriate diagnosis.   On next clinic visit I will try to place and pre approve that medications.   Nothing new can be given for replacement ( no comparable medication), and he is already on   Hydrocodone. He might try Magnesium oxide 250 mg, once a day  OTC for headache.  Thanks,   .    ----- Message -----  From: Rene Ellison  Sent: 4/23/2018   3:31 PM  To: Rhea Gaviria MD    Can you give the patient something different  ----- Message -----  From: Jacque Putnam  Sent: 4/23/2018   2:46 PM  To: [facility] Staff    Calling to speak with someone in Dr. Gaviria's office regarding medication: butalbital-acetaminophen-caffeine -40 mg (FIORICET, ESGIC) -40 mg per tablet needs a non clinical exception. Trying to get clarification to get it approved today. Please call.

## 2018-05-03 DIAGNOSIS — I50.30 HEART FAILURE WITH PRESERVED EJECTION FRACTION: ICD-10-CM

## 2018-05-03 DIAGNOSIS — I10 ESSENTIAL HYPERTENSION: ICD-10-CM

## 2018-05-03 DIAGNOSIS — K86.81 EXOCRINE PANCREATIC INSUFFICIENCY: ICD-10-CM

## 2018-05-03 RX ORDER — FUROSEMIDE 40 MG/1
TABLET ORAL
Qty: 90 TABLET | Refills: 3 | Status: SHIPPED | OUTPATIENT
Start: 2018-05-03 | End: 2019-02-03 | Stop reason: SDUPTHER

## 2018-05-03 RX ORDER — PANCRELIPASE 24000; 76000; 120000 [USP'U]/1; [USP'U]/1; [USP'U]/1
CAPSULE, DELAYED RELEASE PELLETS ORAL
Qty: 210 CAPSULE | Refills: 11 | Status: SHIPPED | OUTPATIENT
Start: 2018-05-03 | End: 2018-10-09 | Stop reason: SDUPTHER

## 2018-06-07 DIAGNOSIS — I50.30 HEART FAILURE WITH PRESERVED LEFT VENTRICULAR FUNCTION (HFPEF): ICD-10-CM

## 2018-06-07 RX ORDER — FUROSEMIDE 80 MG/1
TABLET ORAL
Qty: 90 TABLET | Refills: 3 | Status: ON HOLD | OUTPATIENT
Start: 2018-06-07 | End: 2018-12-19

## 2018-06-11 DIAGNOSIS — E11.42 DIABETIC PERIPHERAL NEUROPATHY: ICD-10-CM

## 2018-06-11 DIAGNOSIS — I10 ESSENTIAL HYPERTENSION: ICD-10-CM

## 2018-06-11 DIAGNOSIS — M54.2 CHRONIC NECK PAIN: ICD-10-CM

## 2018-06-11 DIAGNOSIS — G89.29 CHRONIC LOW BACK PAIN WITH SCIATICA, SCIATICA LATERALITY UNSPECIFIED, UNSPECIFIED BACK PAIN LATERALITY: ICD-10-CM

## 2018-06-11 DIAGNOSIS — G89.29 CHRONIC NECK PAIN: ICD-10-CM

## 2018-06-11 DIAGNOSIS — M50.30 DDD (DEGENERATIVE DISC DISEASE), CERVICAL: ICD-10-CM

## 2018-06-11 DIAGNOSIS — M19.041 PRIMARY OSTEOARTHRITIS OF RIGHT HAND: ICD-10-CM

## 2018-06-11 DIAGNOSIS — M46.92 SPONDYLITIS, CERVICAL: ICD-10-CM

## 2018-06-11 DIAGNOSIS — M54.40 CHRONIC LOW BACK PAIN WITH SCIATICA, SCIATICA LATERALITY UNSPECIFIED, UNSPECIFIED BACK PAIN LATERALITY: ICD-10-CM

## 2018-06-11 DIAGNOSIS — I50.30 HEART FAILURE WITH PRESERVED LEFT VENTRICULAR FUNCTION (HFPEF): ICD-10-CM

## 2018-06-11 RX ORDER — METOLAZONE 5 MG/1
5 TABLET ORAL DAILY PRN
Qty: 30 TABLET | Refills: 6 | Status: SHIPPED | OUTPATIENT
Start: 2018-06-11

## 2018-06-11 NOTE — TELEPHONE ENCOUNTER
----- Message from Bryson Morris sent at 6/11/2018  2:17 PM CDT -----  Contact: Patient @ [phone]  Patient is calling to get an update on the medication refill request for ( gabapentin (NEURONTIN) 600 MG tablet )    [id number] - [place], [place] - [address] AT [facility] [address] [address] [place] [address]  Phone: [phone] Fax: [phone]

## 2018-06-12 RX ORDER — GABAPENTIN 600 MG/1
600 TABLET ORAL
Qty: 360 TABLET | Refills: 0 | Status: SHIPPED | OUTPATIENT
Start: 2018-06-12 | End: 2018-08-02 | Stop reason: SDUPTHER

## 2018-06-21 ENCOUNTER — OFFICE VISIT (OUTPATIENT)
Dept: PHYSICAL MEDICINE AND REHAB | Facility: CLINIC | Age: 68
End: 2018-06-21
Payer: MEDICARE

## 2018-06-21 VITALS
DIASTOLIC BLOOD PRESSURE: 58 MMHG | WEIGHT: 260.81 LBS | BODY MASS INDEX: 39.53 KG/M2 | SYSTOLIC BLOOD PRESSURE: 137 MMHG | HEIGHT: 68 IN | HEART RATE: 69 BPM

## 2018-06-21 DIAGNOSIS — M50.30 DDD (DEGENERATIVE DISC DISEASE), CERVICAL: ICD-10-CM

## 2018-06-21 DIAGNOSIS — M54.16 LUMBAR RADICULOPATHY: ICD-10-CM

## 2018-06-21 DIAGNOSIS — R51.9 NONINTRACTABLE HEADACHE, UNSPECIFIED CHRONICITY PATTERN, UNSPECIFIED HEADACHE TYPE: ICD-10-CM

## 2018-06-21 DIAGNOSIS — G89.29 CHRONIC BILATERAL LOW BACK PAIN WITHOUT SCIATICA: Primary | ICD-10-CM

## 2018-06-21 DIAGNOSIS — M19.041 PRIMARY OSTEOARTHRITIS OF RIGHT HAND: ICD-10-CM

## 2018-06-21 DIAGNOSIS — M46.92 SPONDYLITIS, CERVICAL: ICD-10-CM

## 2018-06-21 DIAGNOSIS — G44.229 CHRONIC TENSION-TYPE HEADACHE, NOT INTRACTABLE: ICD-10-CM

## 2018-06-21 DIAGNOSIS — M54.40 CHRONIC LOW BACK PAIN WITH SCIATICA, SCIATICA LATERALITY UNSPECIFIED, UNSPECIFIED BACK PAIN LATERALITY: ICD-10-CM

## 2018-06-21 DIAGNOSIS — G56.01 CARPAL TUNNEL SYNDROME OF RIGHT WRIST: ICD-10-CM

## 2018-06-21 DIAGNOSIS — G89.29 CHRONIC LEFT HIP PAIN: ICD-10-CM

## 2018-06-21 DIAGNOSIS — M54.50 CHRONIC BILATERAL LOW BACK PAIN WITHOUT SCIATICA: Primary | ICD-10-CM

## 2018-06-21 DIAGNOSIS — Z79.891 LONG-TERM CURRENT USE OF OPIATE ANALGESIC: ICD-10-CM

## 2018-06-21 DIAGNOSIS — M25.552 CHRONIC LEFT HIP PAIN: ICD-10-CM

## 2018-06-21 DIAGNOSIS — E11.42 DIABETIC PERIPHERAL NEUROPATHY: ICD-10-CM

## 2018-06-21 DIAGNOSIS — M54.2 CHRONIC NECK PAIN: ICD-10-CM

## 2018-06-21 DIAGNOSIS — G89.29 CHRONIC NECK PAIN: ICD-10-CM

## 2018-06-21 DIAGNOSIS — R29.898 LEG WEAKNESS, BILATERAL: ICD-10-CM

## 2018-06-21 DIAGNOSIS — G89.29 CHRONIC LOW BACK PAIN WITH SCIATICA, SCIATICA LATERALITY UNSPECIFIED, UNSPECIFIED BACK PAIN LATERALITY: ICD-10-CM

## 2018-06-21 PROBLEM — G44.209 TENSION TYPE HEADACHE: Status: ACTIVE | Noted: 2018-06-21

## 2018-06-21 PROCEDURE — 99999 PR PBB SHADOW E&M-EST. PATIENT-LVL III: CPT | Mod: PBBFAC,,, | Performed by: PHYSICAL MEDICINE & REHABILITATION

## 2018-06-21 PROCEDURE — 99213 OFFICE O/P EST LOW 20 MIN: CPT | Mod: PBBFAC | Performed by: PHYSICAL MEDICINE & REHABILITATION

## 2018-06-21 PROCEDURE — 99214 OFFICE O/P EST MOD 30 MIN: CPT | Mod: S$PBB,,, | Performed by: PHYSICAL MEDICINE & REHABILITATION

## 2018-06-21 RX ORDER — HYDROCODONE BITARTRATE AND ACETAMINOPHEN 10; 325 MG/1; MG/1
1 TABLET ORAL EVERY 6 HOURS PRN
Qty: 120 TABLET | Refills: 0 | Status: SHIPPED | OUTPATIENT
Start: 2018-08-21 | End: 2018-06-22 | Stop reason: SDUPTHER

## 2018-06-21 RX ORDER — BUTALBITAL, ACETAMINOPHEN AND CAFFEINE 50; 325; 40 MG/1; MG/1; MG/1
1 TABLET ORAL EVERY 4 HOURS PRN
Qty: 90 TABLET | Refills: 2 | Status: SHIPPED | OUTPATIENT
Start: 2018-06-21 | End: 2018-10-02 | Stop reason: SDUPTHER

## 2018-06-21 RX ORDER — HYDROCODONE BITARTRATE AND ACETAMINOPHEN 10; 325 MG/1; MG/1
1 TABLET ORAL EVERY 6 HOURS PRN
Qty: 120 TABLET | Refills: 0 | Status: SHIPPED | OUTPATIENT
Start: 2018-07-21 | End: 2018-06-22 | Stop reason: SDUPTHER

## 2018-06-21 RX ORDER — HYDROCODONE BITARTRATE AND ACETAMINOPHEN 10; 325 MG/1; MG/1
1 TABLET ORAL EVERY 6 HOURS PRN
Qty: 120 TABLET | Refills: 0 | Status: SHIPPED | OUTPATIENT
Start: 2018-06-21 | End: 2018-09-17 | Stop reason: SDUPTHER

## 2018-06-21 NOTE — PROGRESS NOTES
""This note will not be shared with the patient."Subjective:       Patient ID: Vinh Castro is a 67 y.o. male.    Chief Complaint: Back Pain and Hip Pain    Back Pain   Associated symptoms include headaches and leg pain. Pertinent negatives include no abdominal pain, chest pain or fever.   Headache    Associated symptoms include back pain and neck pain. Pertinent negatives include no abdominal pain, dizziness, eye pain or fever.   Hip Pain      Hand Pain    Pertinent negatives include no chest pain.   Neck Pain    Associated symptoms include headaches and leg pain. Pertinent negatives include no chest pain, fever or trouble swallowing.   Leg Pain      Shoulder Pain    Associated symptoms include headaches. Pertinent negatives include no fever.      Subjective:       Patient ID: Vinh Castro is a 67 y.o. male.    Chief Complaint: Back Pain and Hip Pain  Mr. Castro is 68 y/o male who returns to clinic for chronic back pain.    [facility] 03/21/18.  He reports a signifincant Back pain, today.  He fell inside house, and sustained an injury to face/ nose.  Does not exercise, has sedentary lifestyle.        Today he reports:  #1 Back pain.  Current low back pain is 7/10, the worst pain 8/10.   Back pain is now localized in Rt buttock, and pain does not run any more down the RT leg.  Pain in Rt buttock, he describes as disabling, and intense sharp, shooting pain down the leg, and also as a  burning pain.   He cannot to bend leg , it hurts to wear a shoes, he does that in sitting position, but he cannot bend Rt leg to place a shoe on foot.  He has diabetic polyneuropathy.  He reports that back pain is off/on,shooting and  burning pain.   Sitting, makes his back pain worse, lying down improves the pain.  Also he has a pain with walking, able to walk  ~ 1 blocks.  No true weakness in legs, no numbness, paraesthesias.  No BB incontinence., no saddle anesthesia.  He takes Gabapentin 600 mg po QID, that is helping, and Hydrocodone 10/325 " mg, takes 4 tabs/day.  Tolerates well Hydrocodone,and wants to continue the same medications.   He had in past Physical therapy, but does not exercises at home.   Here for follow up, and chronic pain management with opiates.    Past Medical History:   Diagnosis Date    *Atrial fibrillation     Acquired hypothyroidism 5/4/2016    Allergy     Anemia     Anticoagulant long-term use     Arthritis     CAD (coronary artery disease)     CABG 2002, PCI    CHF (congestive heart failure)     Chronic anticoagulation     Cirrhosis of liver 01/08/2017    Coronary artery disease     Depression     Diabetic nephropathy     Diabetic retinopathy associated with type 2 diabetes mellitus     Diverticulosis     Glaucoma     patient denies having glaucoma    History of BPH     History of peripheral vascular disease     Bilateral Carotid Artery Stenosis < 40% on Carotid US 7/2012    Hyperlipidemia     Hypertension     Obesity     SEAN (obstructive sleep apnea)     uses C-PAP    Pancreatic insufficiency     Personal history of kidney stones     PN (peripheral neuropathy)     Syncope 5/23/2016    Type 2 diabetes mellitus not at goal        Past Surgical History:   Procedure Laterality Date    ABDOMINAL SURGERY      CATARACT EXTRACTION BILATERAL W/ ANTERIOR VITRECTOMY      COLONOSCOPY      COLONOSCOPY N/A 7/21/2017    Procedure: COLONOSCOPY;  Surgeon: Roberto Sotelo MD;  Location: [address]);  Service: Endoscopy;  Laterality: N/A;  pt requesting ASAP. hx of cirrhosis-labs ordered prior       Per protocol, pt does not have history of stroke/TIA. Ok to hold eliquis x 48 hours prior to procedure-per Sonya Quezada RN-(arrythmia clinic)         CORONARY ANGIOPLASTY  2014    Patent LIMA to LAD, SVG to OM, occluded LAD, 60% Circ-x1cv stent placed    CORONARY ARTERY BYPASS GRAFT  2001    iyer LAD SVG OM1    EYE SURGERY      retina repair    GALLBLADDER SURGERY      HAND SURGERY      LOST THREE  FINGERS/left 1979    HERNIA REPAIR      JOINT REPLACEMENT      RADIOFREQUENCY ABLATION  8/14    atrial flutter    RETINAL LASER PROCEDURE      SKIN BIOPSY      head    TONSILLECTOMY      UPPER GASTROINTESTINAL ENDOSCOPY         Family History   Problem Relation Age of Onset    Cancer Mother         Rhumatoid lung disease    Diabetes Father     Heart attack Father     Heart disease Father     Cancer Father         Lymphona    Obesity Sister     Diabetes Paternal Grandfather     Anesthesia problems Neg Hx     Colon cancer Neg Hx     Esophageal cancer Neg Hx        Social History     Social History    Marital status:      Spouse name: Sandra    Number of children: 1    Years of education: N/A     Social History Main Topics    Smoking status: Never Smoker    Smokeless tobacco: Never Used    Alcohol use No    Drug use: No    Sexual activity: No     Other Topics Concern    None     Social History Narrative    Work: Manager at [employer] activity: None.Hobbies: Fishing.       Current Outpatient Prescriptions   Medication Sig Dispense Refill     insulin regular U-500 concentrated (HUMULIN R U-500, CONCENTRATED,) 500 unit/mL Soln 32-33 unit edgar 30 minutes before meals and night snack 60 mL 5    alprazolam (XANAX) 0.5 MG tablet Take 1 tablet (0.5 mg total) by mouth daily as needed. 30 tablet 0    apixaban (ELIQUIS) 5 mg Tab Take 1 tablet (5 mg total) by mouth 2 (two) times daily. 60 tablet 6    aspirin 81 MG Chew Take 81 mg by mouth once daily.      butalbital-acetaminophen-caffeine -40 mg (FIORICET, ESGIC) -40 mg per tablet Take 1 tablet by mouth every 4 (four) hours as needed for Pain or Headaches. 90 tablet 2    calcium carbonate-simethicone (MAALOX ADVANCED) 1,000-60 mg Chew Take 2 tablets by mouth 3 (three) times daily as needed.      CARAFATE 100 mg/mL suspension Take by mouth as needed.   5    cetirizine (ZYRTEC) 10 MG tablet Take one tablet by mouth every night  at bedtime  0    CREON 24,000-76,000 -120,000 unit capsule TAKE 2 CAPSULES BY MOUTH THREE TIMES DAILY WITH MEALS AND 1 CAPSULE BY MOUTH WITH A SNACK 210 capsule 11    DEXTROSE (GLUCOSE) Chew Take by mouth as needed.      DULoxetine (CYMBALTA) 30 MG capsule Take 1 capsule (30 mg total) by mouth once daily. 30 capsule 11    ferrous sulfate 325 mg (65 mg iron) Tab tablet Take 1 tablet (325 mg total) by mouth once daily. 90 tablet 1    fexofenadine (ALLEGRA) 180 MG tablet Take 180 mg by mouth once daily.      fluocinolone-shower cap 0.01 % Oil       fluticasone (FLONASE) 50 mcg/actuation nasal spray SHAKE LIQUID AND USE 2 SPRAYS IN EACH NOSTRIL EVERY DAY 16 mL 0    furosemide (LASIX) 40 MG tablet TAKE 1 TABLET BY MOUTH EVERY DAY 90 tablet 3    furosemide (LASIX) 80 MG tablet TAKE 1 TABLET BY MOUTH ONCE DAILY (Patient taking differently: TAKE 1 TABLET(80mg) BY MOUTH TWICE DAILY) 90 tablet 3    gabapentin (NEURONTIN) 600 MG tablet Take 1 tablet (600 mg total) by mouth 2 hours after meals and at bedtime. 360 tablet 0    guaifenesin (MUCINEX) 600 mg 12 hr tablet Take 600 mg by mouth continuous prn for Congestion. Prn        HYDROcodone-acetaminophen (NORCO)  mg per tablet Take 1 tablet by mouth every 6 (six) hours as needed for Pain. 120 tablet 0    [START ON 7/21/2018] HYDROcodone-acetaminophen (NORCO)  mg per tablet Take 1 tablet by mouth every 6 (six) hours as needed for Pain. 120 tablet 0    [START ON 8/21/2018] HYDROcodone-acetaminophen (NORCO)  mg per tablet Take 1 tablet by mouth every 6 (six) hours as needed for Pain. 120 tablet 0    hydrOXYzine pamoate (VISTARIL) 25 MG Cap Take 1 capsule (25 mg total) by mouth every 8 (eight) hours as needed. 20 capsule 0    isosorbide mononitrate (IMDUR) 30 MG 24 hr tablet Take 1 tablet (30 mg total) by mouth once daily. 30 tablet 6    KRILL/OM3/DHA/EPA/OM6/LIP/ASTX (KRILL OIL, OMEGA 3 & 6, ORAL) Take 2 tablets by mouth every evening.       levothyroxine (SYNTHROID) 25 MCG tablet Take 1 tablet (25 mcg total) by mouth before breakfast. 30 tablet 11    losartan (COZAAR) 50 MG tablet Take 1 tablet (50 mg total) by mouth once daily. 90 tablet 3    metOLazone (ZAROXOLYN) 5 MG tablet Take 1 tablet (5 mg total) by mouth daily as needed (Weight gain of 5 pounds or more). 30 tablet 6    metoprolol tartrate (LOPRESSOR) 50 MG tablet Take 1 tablet (50 mg total) by mouth 2 (two) times daily. 60 tablet 11    mupirocin (BACTROBAN) 2 % ointment APPLY 2 TO 3 APPLICATIONS ON THE SKIN DAILY  1    MV-MN/FA/LYCOPENE/LUT/HB#178 (RAMEZ MULTIVITAMIN FOR MEN ORAL) Take by mouth.      neomycin-polymyxin-hydrocortisone (CORTISPORIN) 3.5-10,000-1 mg/mL-unit/mL-% otic suspension INSTILL 3 DROPS INTO RIGHT EAR TWICE DAILY AS NEEDED FOR  PAIN AND ITCHING  0    NEVANAC 0.1 % ophthalmic suspension       nitroGLYCERIN (NITROSTAT) 0.4 MG SL tablet Place 1 tablet (0.4 mg total) under the tongue every 5 (five) minutes as needed. 25 tablet 6    ofloxacin (FLOXIN) 0.3 % otic solution Place 3 drops into both ears 2 (two) times daily.       olopatadine (PATADAY) 0.2 % Drop Place 1 drop into both eyes once daily.       ondansetron (ZOFRAN) 4 MG tablet TAKE 1 TABLET(4 MG) BY MOUTH EVERY 8 HOURS AS NEEDED FOR NAUSEA 30 tablet 0    oxymetazoline (MUCINEX SINUS-MAX) 0.05 % nasal spray 2 sprays by Nasal route once daily.      pantoprazole (PROTONIX) 40 MG tablet TAKE 1 TABLET(40 MG) BY MOUTH TWICE DAILY 180 tablet 3    polyethylene glycol (GLYCOLAX) 17 gram/dose powder MIX AND DRINK 17 GRAMS BY MOUTH EVERY  g 3    PROAIR HFA 90 mcg/actuation inhaler INHALE 1-2 PUFFS PO Q 4-6 H PRN COUGH  0    simethicone (MYLICON) 125 MG chewable tablet Take 125 mg by mouth every 6 (six) hours as needed for Flatulence.      tamsulosin (FLOMAX) 0.4 mg Cp24 TAKE 1 CAPSULE BY MOUTH EVERY DAY 90 capsule 5    triamcinolone acetonide 0.1% (KENALOG) 0.1 % cream APPLY 1 APPLICATION ON THE SKIN DAILY   0     No current facility-administered medications for this visit.        Review of patient's allergies indicates:   Allergen Reactions    Cephalexin Hives and Shortness Of Breath    Penicillins Other (See Comments)     Unknown  Hardened skin    Pravastatin Other (See Comments)     myalgia    Sulfa (sulfonamide antibiotics)      Other reaction(s): Unknown    Adhesive Rash     Review of Systems   Constitutional: Negative for activity change, appetite change, chills, diaphoresis, fatigue, fever and unexpected weight change.   HENT: Negative for trouble swallowing and voice change.    Eyes: Negative for pain and visual disturbance.   Respiratory: Negative for chest tightness, shortness of breath and wheezing.    Cardiovascular: Negative for chest pain, palpitations and leg swelling.   Gastrointestinal: Negative for abdominal pain, constipation and diarrhea.   Genitourinary: Negative for difficulty urinating, frequency and urgency.   Musculoskeletal: Positive for back pain and neck pain. Negative for arthralgias, joint swelling, myalgias and neck stiffness.   Skin: Negative for rash and wound.   Neurological: Positive for headaches. Negative for dizziness, facial asymmetry, speech difficulty and light-headedness.   Hematological: Negative for adenopathy.   Psychiatric/Behavioral: Negative for agitation, behavioral problems, confusion, decreased concentration, dysphoric mood and sleep disturbance.         Objective:      Physical Exam    GENERAL: The patient is alert, oriented, pleasant, obese.   MUSCULOSKELETAL:   Gait is normal, walks without cane, on wide basis, small steps, improved balance.  Cervical spine full range of motion in all planes.   Lumbar spine, decreased active range of motion in all planes, flexion to 90 degrees, with shooting,   Side bending and rotation to 20-25 degrees bilateral,    Positive facet loading bilateral, R>L.    Straight leg raising positive bilaterally.   Full range of motion in  all joints x4 extremities.   Muscle strength 5/5 throughout x4 extremities.   No  joint laxity throughout x4 extremities.   Rt  hand  incomplete, weak, can bend all fingers but pinky and ring finger flexion is delayed.  NEUROLOGIC: Cranial nerves II through XII intact.   Deep tendon reflexes is normal, +2 in the upper and lower extremities bilaterally.   Muscle tone is normal.   Sensory is intact to light touch and pinprick throughout x 4 extremities.     Xray of Lumbar spine.   Lumbar spine demonstrates mild to moderate DJD. Marginal osteophytes seen at multiple levels.   Facet hypertrophy seen at the lower levels. Vertebral body heights are fairly well maintained.   Flexion-extension views show no evidence of instability     MRI of Lumbar spine showed ( 10/23/14):   L4-5: Circumferential disk bulge, bilateral facet arthropathy, and ligamentum flavum hypertrophy results in moderate bilateral neural foraminal narrowing    and no significant spinal canal narrowing.  L5-S1: Severe bilateral facet arthropathy and ligament flavum hypertrophy resulting in significant spinal canal or neural foraminal narrowing.   Impression:  Multilevel lumbar spondylosis as detailed above resulting in moderate bilateral neural foraminal narrowing at L4-5 and mild bilateral neural foraminal narrowing at L3-4.    Assessment:           1. Chronic bilateral low back pain without sciatica    2. Chronic left hip pain    3. Chronic low back pain with sciatica, sciatica laterality unspecified, unspecified back pain laterality    4. Leg weakness, bilateral    5. Chronic tension-type headache, not intractable    6. Lumbar radiculopathy    7. Diabetic peripheral neuropathy    8. Long-term current use of opiate analgesic    9. Chronic neck pain    10. Carpal tunnel syndrome of right wrist    11. DDD (degenerative disc disease), cervical    12. Spondylitis, cervical    13. Primary osteoarthritis of right hand    14. Nonintractable headache,  unspecified chronicity pattern, unspecified headache type        Plan:       Chronic bilateral low back pain without sciatica  -     butalbital-acetaminophen-caffeine -40 mg (FIORICET, ESGIC) -40 mg per tablet; Take 1 tablet by mouth every 4 (four) hours as needed for Pain or Headaches.  Dispense: 90 tablet; Refill: 2  -     HYDROcodone-acetaminophen (NORCO)  mg per tablet; Take 1 tablet by mouth every 6 (six) hours as needed for Pain.  Dispense: 120 tablet; Refill: 0  -     HYDROcodone-acetaminophen (NORCO)  mg per tablet; Take 1 tablet by mouth every 6 (six) hours as needed for Pain.  Dispense: 120 tablet; Refill: 0  -     HYDROcodone-acetaminophen (NORCO)  mg per tablet; Take 1 tablet by mouth every 6 (six) hours as needed for Pain.  Dispense: 120 tablet; Refill: 0    Chronic left hip pain  -     butalbital-acetaminophen-caffeine -40 mg (FIORICET, ESGIC) -40 mg per tablet; Take 1 tablet by mouth every 4 (four) hours as needed for Pain or Headaches.  Dispense: 90 tablet; Refill: 2  -     HYDROcodone-acetaminophen (NORCO)  mg per tablet; Take 1 tablet by mouth every 6 (six) hours as needed for Pain.  Dispense: 120 tablet; Refill: 0  -     HYDROcodone-acetaminophen (NORCO)  mg per tablet; Take 1 tablet by mouth every 6 (six) hours as needed for Pain.  Dispense: 120 tablet; Refill: 0  -     HYDROcodone-acetaminophen (NORCO)  mg per tablet; Take 1 tablet by mouth every 6 (six) hours as needed for Pain.  Dispense: 120 tablet; Refill: 0    Chronic low back pain with sciatica, sciatica laterality unspecified, unspecified back pain laterality  -     butalbital-acetaminophen-caffeine -40 mg (FIORICET, ESGIC) -40 mg per tablet; Take 1 tablet by mouth every 4 (four) hours as needed for Pain or Headaches.  Dispense: 90 tablet; Refill: 2  -     HYDROcodone-acetaminophen (NORCO)  mg per tablet; Take 1 tablet by mouth every 6 (six) hours as needed for  Pain.  Dispense: 120 tablet; Refill: 0  -     HYDROcodone-acetaminophen (NORCO)  mg per tablet; Take 1 tablet by mouth every 6 (six) hours as needed for Pain.  Dispense: 120 tablet; Refill: 0  -     HYDROcodone-acetaminophen (NORCO)  mg per tablet; Take 1 tablet by mouth every 6 (six) hours as needed for Pain.  Dispense: 120 tablet; Refill: 0    Leg weakness, bilateral  -     butalbital-acetaminophen-caffeine -40 mg (FIORICET, ESGIC) -40 mg per tablet; Take 1 tablet by mouth every 4 (four) hours as needed for Pain or Headaches.  Dispense: 90 tablet; Refill: 2  -     HYDROcodone-acetaminophen (NORCO)  mg per tablet; Take 1 tablet by mouth every 6 (six) hours as needed for Pain.  Dispense: 120 tablet; Refill: 0  -     HYDROcodone-acetaminophen (NORCO)  mg per tablet; Take 1 tablet by mouth every 6 (six) hours as needed for Pain.  Dispense: 120 tablet; Refill: 0  -     HYDROcodone-acetaminophen (NORCO)  mg per tablet; Take 1 tablet by mouth every 6 (six) hours as needed for Pain.  Dispense: 120 tablet; Refill: 0    Chronic tension-type headache, not intractable  -     butalbital-acetaminophen-caffeine -40 mg (FIORICET, ESGIC) -40 mg per tablet; Take 1 tablet by mouth every 4 (four) hours as needed for Pain or Headaches.  Dispense: 90 tablet; Refill: 2  -     HYDROcodone-acetaminophen (NORCO)  mg per tablet; Take 1 tablet by mouth every 6 (six) hours as needed for Pain.  Dispense: 120 tablet; Refill: 0  -     HYDROcodone-acetaminophen (NORCO)  mg per tablet; Take 1 tablet by mouth every 6 (six) hours as needed for Pain.  Dispense: 120 tablet; Refill: 0  -     HYDROcodone-acetaminophen (NORCO)  mg per tablet; Take 1 tablet by mouth every 6 (six) hours as needed for Pain.  Dispense: 120 tablet; Refill: 0    Lumbar radiculopathy  -     butalbital-acetaminophen-caffeine -40 mg (FIORICET, ESGIC) -40 mg per tablet; Take 1 tablet by mouth every  4 (four) hours as needed for Pain or Headaches.  Dispense: 90 tablet; Refill: 2  -     HYDROcodone-acetaminophen (NORCO)  mg per tablet; Take 1 tablet by mouth every 6 (six) hours as needed for Pain.  Dispense: 120 tablet; Refill: 0  -     HYDROcodone-acetaminophen (NORCO)  mg per tablet; Take 1 tablet by mouth every 6 (six) hours as needed for Pain.  Dispense: 120 tablet; Refill: 0  -     HYDROcodone-acetaminophen (NORCO)  mg per tablet; Take 1 tablet by mouth every 6 (six) hours as needed for Pain.  Dispense: 120 tablet; Refill: 0    Diabetic peripheral neuropathy  -     butalbital-acetaminophen-caffeine -40 mg (FIORICET, ESGIC) -40 mg per tablet; Take 1 tablet by mouth every 4 (four) hours as needed for Pain or Headaches.  Dispense: 90 tablet; Refill: 2  -     HYDROcodone-acetaminophen (NORCO)  mg per tablet; Take 1 tablet by mouth every 6 (six) hours as needed for Pain.  Dispense: 120 tablet; Refill: 0  -     HYDROcodone-acetaminophen (NORCO)  mg per tablet; Take 1 tablet by mouth every 6 (six) hours as needed for Pain.  Dispense: 120 tablet; Refill: 0  -     HYDROcodone-acetaminophen (NORCO)  mg per tablet; Take 1 tablet by mouth every 6 (six) hours as needed for Pain.  Dispense: 120 tablet; Refill: 0    Long-term current use of opiate analgesic  -     butalbital-acetaminophen-caffeine -40 mg (FIORICET, ESGIC) -40 mg per tablet; Take 1 tablet by mouth every 4 (four) hours as needed for Pain or Headaches.  Dispense: 90 tablet; Refill: 2  -     HYDROcodone-acetaminophen (NORCO)  mg per tablet; Take 1 tablet by mouth every 6 (six) hours as needed for Pain.  Dispense: 120 tablet; Refill: 0  -     HYDROcodone-acetaminophen (NORCO)  mg per tablet; Take 1 tablet by mouth every 6 (six) hours as needed for Pain.  Dispense: 120 tablet; Refill: 0  -     HYDROcodone-acetaminophen (NORCO)  mg per tablet; Take 1 tablet by mouth every 6 (six) hours  as needed for Pain.  Dispense: 120 tablet; Refill: 0    Chronic neck pain  -     butalbital-acetaminophen-caffeine -40 mg (FIORICET, ESGIC) -40 mg per tablet; Take 1 tablet by mouth every 4 (four) hours as needed for Pain or Headaches.  Dispense: 90 tablet; Refill: 2  -     HYDROcodone-acetaminophen (NORCO)  mg per tablet; Take 1 tablet by mouth every 6 (six) hours as needed for Pain.  Dispense: 120 tablet; Refill: 0  -     HYDROcodone-acetaminophen (NORCO)  mg per tablet; Take 1 tablet by mouth every 6 (six) hours as needed for Pain.  Dispense: 120 tablet; Refill: 0  -     HYDROcodone-acetaminophen (NORCO)  mg per tablet; Take 1 tablet by mouth every 6 (six) hours as needed for Pain.  Dispense: 120 tablet; Refill: 0    Carpal tunnel syndrome of right wrist  -     butalbital-acetaminophen-caffeine -40 mg (FIORICET, ESGIC) -40 mg per tablet; Take 1 tablet by mouth every 4 (four) hours as needed for Pain or Headaches.  Dispense: 90 tablet; Refill: 2  -     HYDROcodone-acetaminophen (NORCO)  mg per tablet; Take 1 tablet by mouth every 6 (six) hours as needed for Pain.  Dispense: 120 tablet; Refill: 0  -     HYDROcodone-acetaminophen (NORCO)  mg per tablet; Take 1 tablet by mouth every 6 (six) hours as needed for Pain.  Dispense: 120 tablet; Refill: 0  -     HYDROcodone-acetaminophen (NORCO)  mg per tablet; Take 1 tablet by mouth every 6 (six) hours as needed for Pain.  Dispense: 120 tablet; Refill: 0    DDD (degenerative disc disease), cervical  -     butalbital-acetaminophen-caffeine -40 mg (FIORICET, ESGIC) -40 mg per tablet; Take 1 tablet by mouth every 4 (four) hours as needed for Pain or Headaches.  Dispense: 90 tablet; Refill: 2  -     HYDROcodone-acetaminophen (NORCO)  mg per tablet; Take 1 tablet by mouth every 6 (six) hours as needed for Pain.  Dispense: 120 tablet; Refill: 0  -     HYDROcodone-acetaminophen (NORCO)  mg per  tablet; Take 1 tablet by mouth every 6 (six) hours as needed for Pain.  Dispense: 120 tablet; Refill: 0  -     HYDROcodone-acetaminophen (NORCO)  mg per tablet; Take 1 tablet by mouth every 6 (six) hours as needed for Pain.  Dispense: 120 tablet; Refill: 0    Spondylitis, cervical  -     butalbital-acetaminophen-caffeine -40 mg (FIORICET, ESGIC) -40 mg per tablet; Take 1 tablet by mouth every 4 (four) hours as needed for Pain or Headaches.  Dispense: 90 tablet; Refill: 2  -     HYDROcodone-acetaminophen (NORCO)  mg per tablet; Take 1 tablet by mouth every 6 (six) hours as needed for Pain.  Dispense: 120 tablet; Refill: 0  -     HYDROcodone-acetaminophen (NORCO)  mg per tablet; Take 1 tablet by mouth every 6 (six) hours as needed for Pain.  Dispense: 120 tablet; Refill: 0  -     HYDROcodone-acetaminophen (NORCO)  mg per tablet; Take 1 tablet by mouth every 6 (six) hours as needed for Pain.  Dispense: 120 tablet; Refill: 0    Primary osteoarthritis of right hand  -     butalbital-acetaminophen-caffeine -40 mg (FIORICET, ESGIC) -40 mg per tablet; Take 1 tablet by mouth every 4 (four) hours as needed for Pain or Headaches.  Dispense: 90 tablet; Refill: 2  -     HYDROcodone-acetaminophen (NORCO)  mg per tablet; Take 1 tablet by mouth every 6 (six) hours as needed for Pain.  Dispense: 120 tablet; Refill: 0  -     HYDROcodone-acetaminophen (NORCO)  mg per tablet; Take 1 tablet by mouth every 6 (six) hours as needed for Pain.  Dispense: 120 tablet; Refill: 0  -     HYDROcodone-acetaminophen (NORCO)  mg per tablet; Take 1 tablet by mouth every 6 (six) hours as needed for Pain.  Dispense: 120 tablet; Refill: 0    Nonintractable headache, unspecified chronicity pattern, unspecified headache type  -     butalbital-acetaminophen-caffeine -40 mg (FIORICET, ESGIC) -40 mg per tablet; Take 1 tablet by mouth every 4 (four) hours as needed for Pain or  Headaches.  Dispense: 90 tablet; Refill: 2  -     HYDROcodone-acetaminophen (NORCO)  mg per tablet; Take 1 tablet by mouth every 6 (six) hours as needed for Pain.  Dispense: 120 tablet; Refill: 0  -     HYDROcodone-acetaminophen (NORCO)  mg per tablet; Take 1 tablet by mouth every 6 (six) hours as needed for Pain.  Dispense: 120 tablet; Refill: 0  -     HYDROcodone-acetaminophen (NORCO)  mg per tablet; Take 1 tablet by mouth every 6 (six) hours as needed for Pain.  Dispense: 120 tablet; Refill: 0    Patient with worsening of chronic back pain, secondary to lumbar spondylosis, facet arthropathy, and possible Rt L5-S1 lumbar radiculopathy.  Also with worsening of Headache that is responding to Fioricet well.    1. Pain management:   Hydrocodone 10/325 mg po q6 hrs prn pain, decreased monthly and daily dose from #150/months or 5 tabs/day down to #120 ( max 4 tabs/day) with 2 refills.  He is not happy but agreeable.  We had an extended talk about further decrease in opioid therapy, and increase activity, to improve function.   Discussed eight loss, and core strengthening (water therapy, stationary biking and walking seems to be the best exercise for him).  Will resume  Neurontin 600 mg po tid- qid if tolerated ( one in am / noon/ at 2 at bedtime).    2. Safe opiate management:  Opiate risk tool completed, moderate risk level , at 6.   reviewed and appropriate.   Hydrocodone refills on 5/22, 4/23, 3/26, 2/21/8, 1/31/18, 12/21/17.  Urine drug screen one on 12/21/17 ( + for opiates).        Follow up in 3 months.     Total time spent face to face with patient was 25 minutes.   More than 50% of that time was spent in counseling on diagnosis , prognosis and treatment options.   I also  patient  on common and most usual side effect of prescribed medications. Risk and benefits of opiates, possible risk of developing opiate dependence and tolerance, need of strict compliance with prescribed  medications.  Pain contract, rules and obligations were discussed with patient in details.  He is aware that I would be the only doctor prescribing him pain medications and ED in a case of emergency.  I reviewed Primary care , and other specialty's notes to better coordinate patient's  care.   All questions were answered, and patient voiced understanding.

## 2018-06-28 ENCOUNTER — TELEPHONE (OUTPATIENT)
Dept: PHYSICAL MEDICINE AND REHAB | Facility: CLINIC | Age: 68
End: 2018-06-28

## 2018-06-28 NOTE — TELEPHONE ENCOUNTER
----- Message from Hayes Handy sent at 6/28/2018  2:15 PM CDT -----  Pharmacy Calling    Reason for call: [facility] is asking for diagnosis for prescription.  Pharmacy Name: [facility]/ [facility]  Prescription Name: butalbital-acetaminophen-caffeine -40 mg (FIORICET, ESGIC) -40 mg per tablet  Phone Number: [phone]  Additional Information: Mell is also sending over a fax regarding this. Pls fax back to

## 2018-08-02 DIAGNOSIS — G89.29 CHRONIC NECK PAIN: ICD-10-CM

## 2018-08-02 DIAGNOSIS — E11.42 DIABETIC PERIPHERAL NEUROPATHY: ICD-10-CM

## 2018-08-02 DIAGNOSIS — M19.041 PRIMARY OSTEOARTHRITIS OF RIGHT HAND: ICD-10-CM

## 2018-08-02 DIAGNOSIS — M46.92 SPONDYLITIS, CERVICAL: ICD-10-CM

## 2018-08-02 DIAGNOSIS — M50.30 DDD (DEGENERATIVE DISC DISEASE), CERVICAL: ICD-10-CM

## 2018-08-02 DIAGNOSIS — G89.29 CHRONIC LOW BACK PAIN WITH SCIATICA, SCIATICA LATERALITY UNSPECIFIED, UNSPECIFIED BACK PAIN LATERALITY: ICD-10-CM

## 2018-08-02 DIAGNOSIS — M54.2 CHRONIC NECK PAIN: ICD-10-CM

## 2018-08-02 DIAGNOSIS — M54.40 CHRONIC LOW BACK PAIN WITH SCIATICA, SCIATICA LATERALITY UNSPECIFIED, UNSPECIFIED BACK PAIN LATERALITY: ICD-10-CM

## 2018-08-03 RX ORDER — GABAPENTIN 600 MG/1
TABLET ORAL
Qty: 360 TABLET | Refills: 0 | Status: SHIPPED | OUTPATIENT
Start: 2018-08-03 | End: 2018-10-19 | Stop reason: SDUPTHER

## 2018-08-20 RX ORDER — POLYETHYLENE GLYCOL 3350 17 G/17G
POWDER, FOR SOLUTION ORAL
Qty: 527 G | Refills: 0 | Status: SHIPPED | OUTPATIENT
Start: 2018-08-20 | End: 2018-09-17 | Stop reason: SDUPTHER

## 2018-08-24 ENCOUNTER — TELEPHONE (OUTPATIENT)
Dept: PHYSICAL MEDICINE AND REHAB | Facility: CLINIC | Age: 68
End: 2018-08-24

## 2018-08-24 NOTE — TELEPHONE ENCOUNTER
----- Message from Hayes Handy sent at 8/24/2018 12:20 PM CDT -----  Needs Advice    Reason for call: Pt is calling to confirm what he needs to do renew his handicap sticker   Communication Preference: [phone]  Additional Information:

## 2018-08-26 DIAGNOSIS — I15.9 SECONDARY HYPERTENSION: ICD-10-CM

## 2018-08-26 DIAGNOSIS — I50.30 HEART FAILURE WITH PRESERVED LEFT VENTRICULAR FUNCTION (HFPEF): ICD-10-CM

## 2018-08-26 RX ORDER — METOPROLOL TARTRATE 50 MG/1
50 TABLET ORAL 2 TIMES DAILY
Qty: 60 TABLET | Refills: 11 | Status: SHIPPED | OUTPATIENT
Start: 2018-08-26 | End: 2019-08-26

## 2018-09-02 DIAGNOSIS — E03.9 ACQUIRED HYPOTHYROIDISM: ICD-10-CM

## 2018-09-04 RX ORDER — LEVOTHYROXINE SODIUM 25 UG/1
TABLET ORAL
Qty: 90 TABLET | Refills: 3 | Status: SHIPPED | OUTPATIENT
Start: 2018-09-04 | End: 2018-10-09 | Stop reason: SDUPTHER

## 2018-09-17 DIAGNOSIS — R29.898 LEG WEAKNESS, BILATERAL: ICD-10-CM

## 2018-09-17 DIAGNOSIS — M50.30 DDD (DEGENERATIVE DISC DISEASE), CERVICAL: ICD-10-CM

## 2018-09-17 DIAGNOSIS — G89.29 CHRONIC NECK PAIN: ICD-10-CM

## 2018-09-17 DIAGNOSIS — E11.42 DIABETIC PERIPHERAL NEUROPATHY: ICD-10-CM

## 2018-09-17 DIAGNOSIS — M54.16 LUMBAR RADICULOPATHY: ICD-10-CM

## 2018-09-17 DIAGNOSIS — R51.9 NONINTRACTABLE HEADACHE, UNSPECIFIED CHRONICITY PATTERN, UNSPECIFIED HEADACHE TYPE: ICD-10-CM

## 2018-09-17 DIAGNOSIS — G89.29 CHRONIC LOW BACK PAIN WITH SCIATICA, SCIATICA LATERALITY UNSPECIFIED, UNSPECIFIED BACK PAIN LATERALITY: ICD-10-CM

## 2018-09-17 DIAGNOSIS — Z79.891 LONG-TERM CURRENT USE OF OPIATE ANALGESIC: ICD-10-CM

## 2018-09-17 DIAGNOSIS — G44.229 CHRONIC TENSION-TYPE HEADACHE, NOT INTRACTABLE: ICD-10-CM

## 2018-09-17 DIAGNOSIS — M54.2 CHRONIC NECK PAIN: ICD-10-CM

## 2018-09-17 DIAGNOSIS — G56.01 CARPAL TUNNEL SYNDROME OF RIGHT WRIST: ICD-10-CM

## 2018-09-17 DIAGNOSIS — M46.92 SPONDYLITIS, CERVICAL: ICD-10-CM

## 2018-09-17 DIAGNOSIS — M25.552 CHRONIC LEFT HIP PAIN: ICD-10-CM

## 2018-09-17 DIAGNOSIS — G89.29 CHRONIC LEFT HIP PAIN: ICD-10-CM

## 2018-09-17 DIAGNOSIS — M54.50 CHRONIC BILATERAL LOW BACK PAIN WITHOUT SCIATICA: ICD-10-CM

## 2018-09-17 DIAGNOSIS — G89.29 CHRONIC BILATERAL LOW BACK PAIN WITHOUT SCIATICA: ICD-10-CM

## 2018-09-17 DIAGNOSIS — M19.041 PRIMARY OSTEOARTHRITIS OF RIGHT HAND: ICD-10-CM

## 2018-09-17 DIAGNOSIS — M54.40 CHRONIC LOW BACK PAIN WITH SCIATICA, SCIATICA LATERALITY UNSPECIFIED, UNSPECIFIED BACK PAIN LATERALITY: ICD-10-CM

## 2018-09-17 RX ORDER — POLYETHYLENE GLYCOL 3350 17 G/17G
POWDER, FOR SOLUTION ORAL
Qty: 527 G | Refills: 0 | Status: SHIPPED | OUTPATIENT
Start: 2018-09-17 | End: 2018-10-14 | Stop reason: SDUPTHER

## 2018-09-17 NOTE — TELEPHONE ENCOUNTER
----- Message from Bryson Morris sent at 9/17/2018  2:26 PM CDT -----  Contact: Patient @ [phone]  Rx Refill/Request     Is this a Refill or New Rx: Yes    Rx Name and Strength: HYDROcodone-acetaminophen (NORCO)  mg per tablet    Preferred Pharmacy with phone number:     [id number] - [place], [place] - [address] [place] [address]  Phone: [phone] Fax: [phone]

## 2018-09-18 RX ORDER — HYDROCODONE BITARTRATE AND ACETAMINOPHEN 10; 325 MG/1; MG/1
1 TABLET ORAL EVERY 6 HOURS PRN
Qty: 120 TABLET | Refills: 0 | Status: SHIPPED | OUTPATIENT
Start: 2018-09-18 | End: 2018-10-19 | Stop reason: SDUPTHER

## 2018-09-28 DIAGNOSIS — R11.0 NAUSEA: ICD-10-CM

## 2018-09-29 RX ORDER — ONDANSETRON 4 MG/1
TABLET, FILM COATED ORAL
Qty: 30 TABLET | Refills: 6 | Status: SHIPPED | OUTPATIENT
Start: 2018-09-29

## 2018-10-02 DIAGNOSIS — M19.041 PRIMARY OSTEOARTHRITIS OF RIGHT HAND: ICD-10-CM

## 2018-10-02 DIAGNOSIS — M50.30 DDD (DEGENERATIVE DISC DISEASE), CERVICAL: ICD-10-CM

## 2018-10-02 DIAGNOSIS — G56.01 CARPAL TUNNEL SYNDROME OF RIGHT WRIST: ICD-10-CM

## 2018-10-02 DIAGNOSIS — M54.16 LUMBAR RADICULOPATHY: ICD-10-CM

## 2018-10-02 DIAGNOSIS — E11.42 DIABETIC PERIPHERAL NEUROPATHY: ICD-10-CM

## 2018-10-02 DIAGNOSIS — R29.898 LEG WEAKNESS, BILATERAL: ICD-10-CM

## 2018-10-02 DIAGNOSIS — M54.40 CHRONIC LOW BACK PAIN WITH SCIATICA, SCIATICA LATERALITY UNSPECIFIED, UNSPECIFIED BACK PAIN LATERALITY: ICD-10-CM

## 2018-10-02 DIAGNOSIS — M54.50 CHRONIC BILATERAL LOW BACK PAIN WITHOUT SCIATICA: ICD-10-CM

## 2018-10-02 DIAGNOSIS — M54.2 CHRONIC NECK PAIN: ICD-10-CM

## 2018-10-02 DIAGNOSIS — G89.29 CHRONIC LEFT HIP PAIN: ICD-10-CM

## 2018-10-02 DIAGNOSIS — G44.229 CHRONIC TENSION-TYPE HEADACHE, NOT INTRACTABLE: ICD-10-CM

## 2018-10-02 DIAGNOSIS — G89.29 CHRONIC BILATERAL LOW BACK PAIN WITHOUT SCIATICA: ICD-10-CM

## 2018-10-02 DIAGNOSIS — Z79.891 LONG-TERM CURRENT USE OF OPIATE ANALGESIC: ICD-10-CM

## 2018-10-02 DIAGNOSIS — M25.552 CHRONIC LEFT HIP PAIN: ICD-10-CM

## 2018-10-02 DIAGNOSIS — R51.9 NONINTRACTABLE HEADACHE, UNSPECIFIED CHRONICITY PATTERN, UNSPECIFIED HEADACHE TYPE: ICD-10-CM

## 2018-10-02 DIAGNOSIS — G89.29 CHRONIC LOW BACK PAIN WITH SCIATICA, SCIATICA LATERALITY UNSPECIFIED, UNSPECIFIED BACK PAIN LATERALITY: ICD-10-CM

## 2018-10-02 DIAGNOSIS — M46.92 SPONDYLITIS, CERVICAL: ICD-10-CM

## 2018-10-02 DIAGNOSIS — G89.29 CHRONIC NECK PAIN: ICD-10-CM

## 2018-10-04 RX ORDER — BUTALBITAL, ACETAMINOPHEN AND CAFFEINE 50; 325; 40 MG/1; MG/1; MG/1
1 TABLET ORAL EVERY 4 HOURS PRN
Qty: 90 TABLET | Refills: 0 | Status: SHIPPED | OUTPATIENT
Start: 2018-10-04 | End: 2018-12-05 | Stop reason: SDUPTHER

## 2018-10-15 RX ORDER — POLYETHYLENE GLYCOL 3350 17 G/17G
POWDER, FOR SOLUTION ORAL
Qty: 527 G | Refills: 6 | Status: SHIPPED | OUTPATIENT
Start: 2018-10-15 | End: 2019-05-04 | Stop reason: SDUPTHER

## 2018-10-19 ENCOUNTER — OFFICE VISIT (OUTPATIENT)
Dept: PHYSICAL MEDICINE AND REHAB | Facility: CLINIC | Age: 68
End: 2018-10-19
Payer: MEDICARE

## 2018-10-19 VITALS
DIASTOLIC BLOOD PRESSURE: 60 MMHG | HEIGHT: 68 IN | TEMPERATURE: 67 F | SYSTOLIC BLOOD PRESSURE: 144 MMHG | BODY MASS INDEX: 40.77 KG/M2 | WEIGHT: 269 LBS

## 2018-10-19 DIAGNOSIS — M46.92 SPONDYLITIS, CERVICAL: ICD-10-CM

## 2018-10-19 DIAGNOSIS — M50.30 DDD (DEGENERATIVE DISC DISEASE), CERVICAL: ICD-10-CM

## 2018-10-19 DIAGNOSIS — M54.40 CHRONIC LOW BACK PAIN WITH SCIATICA, SCIATICA LATERALITY UNSPECIFIED, UNSPECIFIED BACK PAIN LATERALITY: Primary | ICD-10-CM

## 2018-10-19 DIAGNOSIS — R51.9 NONINTRACTABLE HEADACHE, UNSPECIFIED CHRONICITY PATTERN, UNSPECIFIED HEADACHE TYPE: ICD-10-CM

## 2018-10-19 DIAGNOSIS — M54.50 CHRONIC BILATERAL LOW BACK PAIN WITHOUT SCIATICA: ICD-10-CM

## 2018-10-19 DIAGNOSIS — R29.898 LEG WEAKNESS, BILATERAL: ICD-10-CM

## 2018-10-19 DIAGNOSIS — M54.2 CHRONIC NECK PAIN: ICD-10-CM

## 2018-10-19 DIAGNOSIS — Z79.891 LONG-TERM CURRENT USE OF OPIATE ANALGESIC: ICD-10-CM

## 2018-10-19 DIAGNOSIS — G89.29 CHRONIC LOW BACK PAIN WITH SCIATICA, SCIATICA LATERALITY UNSPECIFIED, UNSPECIFIED BACK PAIN LATERALITY: Primary | ICD-10-CM

## 2018-10-19 DIAGNOSIS — G89.29 CHRONIC LEFT HIP PAIN: ICD-10-CM

## 2018-10-19 DIAGNOSIS — M19.041 PRIMARY OSTEOARTHRITIS OF RIGHT HAND: ICD-10-CM

## 2018-10-19 DIAGNOSIS — G89.29 CHRONIC BILATERAL LOW BACK PAIN WITHOUT SCIATICA: ICD-10-CM

## 2018-10-19 DIAGNOSIS — M25.552 CHRONIC LEFT HIP PAIN: ICD-10-CM

## 2018-10-19 DIAGNOSIS — G89.29 CHRONIC NECK PAIN: ICD-10-CM

## 2018-10-19 DIAGNOSIS — G44.229 CHRONIC TENSION-TYPE HEADACHE, NOT INTRACTABLE: ICD-10-CM

## 2018-10-19 DIAGNOSIS — M54.16 LUMBAR RADICULOPATHY: ICD-10-CM

## 2018-10-19 DIAGNOSIS — E11.42 DIABETIC PERIPHERAL NEUROPATHY: ICD-10-CM

## 2018-10-19 DIAGNOSIS — G56.01 CARPAL TUNNEL SYNDROME OF RIGHT WRIST: ICD-10-CM

## 2018-10-19 PROCEDURE — 99213 OFFICE O/P EST LOW 20 MIN: CPT | Mod: PBBFAC | Performed by: PHYSICAL MEDICINE & REHABILITATION

## 2018-10-19 PROCEDURE — 99214 OFFICE O/P EST MOD 30 MIN: CPT | Mod: S$PBB,,, | Performed by: PHYSICAL MEDICINE & REHABILITATION

## 2018-10-19 PROCEDURE — 99999 PR PBB SHADOW E&M-EST. PATIENT-LVL III: CPT | Mod: PBBFAC,,, | Performed by: PHYSICAL MEDICINE & REHABILITATION

## 2018-10-19 RX ORDER — HYDROCODONE BITARTRATE AND ACETAMINOPHEN 10; 325 MG/1; MG/1
1 TABLET ORAL EVERY 6 HOURS PRN
Qty: 120 TABLET | Refills: 0 | Status: ON HOLD | OUTPATIENT
Start: 2018-11-26 | End: 2018-12-19

## 2018-10-19 RX ORDER — HYDROCODONE BITARTRATE AND ACETAMINOPHEN 10; 325 MG/1; MG/1
1 TABLET ORAL EVERY 6 HOURS PRN
Qty: 120 TABLET | Refills: 0 | Status: SHIPPED | OUTPATIENT
Start: 2018-12-26 | End: 2019-03-04 | Stop reason: SDUPTHER

## 2018-10-19 RX ORDER — HYDROCODONE BITARTRATE AND ACETAMINOPHEN 10; 325 MG/1; MG/1
1 TABLET ORAL EVERY 6 HOURS PRN
Qty: 120 TABLET | Refills: 0 | Status: SHIPPED | OUTPATIENT
Start: 2018-10-26 | End: 2018-11-25

## 2018-10-19 RX ORDER — GABAPENTIN 600 MG/1
TABLET ORAL
Qty: 360 TABLET | Refills: 3 | Status: SHIPPED | OUTPATIENT
Start: 2018-10-19

## 2018-10-19 NOTE — PROGRESS NOTES
""This note will not be shared with the patient."Subjective:       Patient ID: Vinh Castro is a 68 y.o. male.    Chief Complaint: Follow-up    Back Pain   Associated symptoms include headaches and leg pain. Pertinent negatives include no abdominal pain, chest pain or fever.   Hip Pain      Headache    Associated symptoms include back pain and neck pain. Pertinent negatives include no abdominal pain, dizziness, eye pain or fever.   Hand Pain    Pertinent negatives include no chest pain.   Neck Pain    Associated symptoms include headaches and leg pain. Pertinent negatives include no chest pain, fever or trouble swallowing.   Leg Pain      Shoulder Pain    Associated symptoms include headaches. Pertinent negatives include no fever.      Subjective:       Patient ID: Vinh Castro is a 68 y.o. male.    Chief Complaint: Follow-up  Mr. Castro is 69 y/o male who returns to clinic for chronic back pain.    V  06/21/18.    Today he reports:  #1 Back pain.  Current low back pain is 7/10, the worst pain 8/10.   Back pain is now localized in Rt buttock, and pain does not run any more down the RT leg.  Pain in Rt buttock, he describes as disabling, and intense sharp, shooting pain down the leg, and also as a  burning pain.   He cannot to bend leg , it hurts to wear a shoes, he does that in sitting position, but he cannot bend Rt leg to place a shoe on foot.  He has diabetic polyneuropathy.  He reports that back pain is off/on,shooting and  burning pain.   Sitting, makes his back pain worse, lying down improves the pain.  Also he has a pain with walking, able to walk  ~ 1 blocks.  No true weakness in legs, no numbness, paraesthesias.  No BB incontinence., no saddle anesthesia.  He takes Gabapentin 600 mg po QID, that is helping, and Hydrocodone 10/325 mg,   takes 4 tabs/day.  Tolerates well Hydrocodone,and wants to continue the same medications.   He had a Physical therapy, but does not exercises at home, has sedentary " lifestyle.  Here for follow up, and chronic pain management with opiates.    Past Medical History:   Diagnosis Date    *Atrial fibrillation     Acquired hypothyroidism 5/4/2016    Allergy     Anemia     Anticoagulant long-term use     Arthritis     CAD (coronary artery disease)     CABG 2002, PCI    CHF (congestive heart failure)     Chronic anticoagulation     Cirrhosis of liver 01/08/2017    Coronary artery disease     Depression     Diabetic nephropathy     Diabetic retinopathy associated with type 2 diabetes mellitus     Diverticulosis     Glaucoma     patient denies having glaucoma    History of BPH     History of peripheral vascular disease     Bilateral Carotid Artery Stenosis < 40% on Carotid US 7/2012    Hyperlipidemia     Hypertension     Obesity     SEAN (obstructive sleep apnea)     uses C-PAP    Pancreatic insufficiency     Personal history of kidney stones     PN (peripheral neuropathy)     Syncope 5/23/2016    Type 2 diabetes mellitus not at goal        Past Surgical History:   Procedure Laterality Date    ABDOMINAL SURGERY      ABLATION N/A 8/24/2015    Performed [facility] at [facility] CATH LAB    ABLATION N/A 8/27/2014    [facility] at [facility] CATH LAB    ARTHROSCOPY-KNEE Left 6/3/2014    [facility]    CATARACT EXTRACTION BILATERAL W/ ANTERIOR VITRECTOMY      CHONDROPLASTY-KNEE Left 6/3/2014    Performed [facility] at [facility] OR    COLONOSCOPY      COLONOSCOPY N/A 7/21/2017    Procedure: COLONOSCOPY;  Surgeon: Roberto Sotelo MD;  Location: [facility] (4TH FLR);  Service: Endoscopy;  Laterality: N/A;  pt requesting ASAP. hx of cirrhosis-labs ordered prior       Per protocol, pt does not have history of stroke/TIA. Ok to hold eliquis x 48 hours prior to procedure-per Sonya Quezada RN-(arrythmia clinic)         COLONOSCOPY N/A 7/21/2017    Performed by [facility]elo [facility] at [facility] ENDO (4TH FLR)    CORONARY  ANGIOPLASTY  2014    Patent LIMA to LAD, SVG to OM, occluded LAD, 60% Circ-x1cv stent placed    CORONARY ARTERY BYPASS GRAFT  2001    iyer LAD SVG OM1    DEBRIDEMENT-KNEE Left 6/3/2014    Performed by [facility] at [facility] [place] OR    EGD (ESOPHAGOGASTRODUODENOSCOPY) N/A 4/4/2014    Performed by Rick Martinez MD at [facility] ENDO (4TH FLR)    ESOPHAGOGASTRODUODENOSCOPY (EGD) N/A 7/21/2017    Performed by Roberto Sotelo MD at [facility] ENDO (4TH FLR)    ESOPHAGOGASTRODUODENOSCOPY (EGD) N/A 2/6/2017    Performed by Shimon Salazar MD at [facility] ENDO (4TH FLR)    EXCISION-PLICA Left 6/3/2014    Performed by [facility]ennifer Carter MD at [facility] [place] OR    EYE SURGERY      retina repair    GALLBLADDER SURGERY      HAND SURGERY      LOST THREE FINGERS/left 1979    HEART CATH-LEFT Left 12/5/2014    Performed by Miguel A Mccarthy MD at [facility] CATH LAB    HERNIA REPAIR      JOINT REPLACEMENT      MENISCECTOMY med/lat  Left 6/3/2014    Performed by [facility]ennifer Carter [facility] at [facility] [place] OR    PLACEMENT-LOOP RECORDER N/A 5/24/2016    Performed by [facility] at [facility] CATH LAB    RADIOFREQUENCY ABLATION  8/14    atrial flutter    RETINAL LASER PROCEDURE      SKIN BIOPSY      head    TONSILLECTOMY      TRANSESOPHAGEAL ECHOCARDIOGRAM (LILI) N/A 8/24/2015    Performed by [facility] at [facility] CATH LAB    ULTRASOUND-ENDOSCOPIC-UPPER N/A 2/23/2015    Performed by Nikita Hnedrix MD at [facility] ENDO (2ND FLR)    UPPER GASTROINTESTINAL ENDOSCOPY         Family History   Problem Relation Age of Onset    Cancer Mother         Rhumatoid lung disease    Diabetes Father     Heart attack Father     Heart disease Father     Cancer Father         Lymphona    Obesity Sister     Diabetes Paternal Grandfather     Anesthesia problems Neg Hx     Colon cancer Neg Hx     Esophageal cancer Neg Hx        Social History     Socioeconomic History    Marital status:      Spouse name: Sandra    Number of children: 1    Years of education: None     Highest education level: None   Social Needs    Financial resource strain: None    Food insecurity - worry: None    Food insecurity - inability: None    Transportation needs - medical: None    Transportation needs - non-medical: None   Occupational History    None   Tobacco Use    Smoking status: Never Smoker    Smokeless tobacco: Never Used   Substance and Sexual Activity    Alcohol use: No    Drug use: No    Sexual activity: No     Partners: Female   Other Topics Concern    None   Social History Narrative    Work: Manager at "[employer]Physical activity: None.Hobbies: Fishing.       Current Outpatient Medications   Medication Sig Dispense Refill     insulin regular U-500 concentrated (HUMULIN R U-500, CONC, INSULIN) 500 unit/mL Soln 32-33 unit edgar 30 minutes before meals and night snack 60 mL 5    ACCU-CHEK JANEY PLUS TEST STRP Strp Inject 1 strip into the skin 3 (three) times daily. 300 strip 3    aspirin 81 MG Chew Take 81 mg by mouth once daily.      betamethasone dipropionate (DIPROLENE) 0.05 % cream Apply 1 application topically as needed.      blood-glucose meter (ACCU-CHEK JANEY PLUS METER) Misc Inject 1 each into the skin 3 (three) times daily. 1 each 0    butalbital-acetaminophen-caffeine -40 mg (FIORICET, ESGIC) -40 mg per tablet Take 1 tablet by mouth every 4 (four) hours as needed for Pain or Headaches. 90 tablet 0    calcium carbonate-simethicone (MAALOX ADVANCED) 1,000-60 mg Chew Take 2 tablets by mouth 3 (three) times daily as needed.      CARAFATE 100 mg/mL suspension Take by mouth as needed.   5    clindamycin (CLEOCIN) 300 MG capsule Take 1 capsule (300 mg total) by mouth 3 (three) times daily. 42 capsule 0    DEXTROSE (GLUCOSE) Chew Take by mouth as needed.      DULoxetine (CYMBALTA) 30 MG capsule Take 1 capsule (30 mg total) by mouth once daily. 30 capsule 11    ELIQUIS 5 mg Tab TAKE 1 TABLET(5 MG) BY MOUTH TWICE DAILY 60 tablet 6    ferrous sulfate 325 mg (65 mg  iron) Tab tablet Take 1 tablet (325 mg total) by mouth once daily. 90 tablet 1    fexofenadine (ALLEGRA) 180 MG tablet Take 60 mg by mouth once daily.       fluocinolone acetonide oil 0.01 % Drop Apply 1 application topically twice a week.      fluocinolone and shower cap 0.01 % Oil fluocinolone 0.01 % scalp oil and shower cap   APPLY TO THE SCALP AND BEHIND EARS QHS      fluticasone (FLONASE) 50 mcg/actuation nasal spray SHAKE LIQUID AND USE 2 SPRAYS IN EACH NOSTRIL EVERY DAY 16 mL 0    fluticasone (FLONASE) 50 mcg/actuation nasal spray SHAKE LIQUID AND USE 2 SPRAYS IN EACH NOSTRIL EVERY DAY 16 mL 6    furosemide (LASIX) 40 MG tablet TAKE 1 TABLET BY MOUTH EVERY DAY (Patient taking differently: TAKE 1 TABLET BY MOUTH EVERY DAY when not taking 80mg takes 40) 90 tablet 3    furosemide (LASIX) 80 MG tablet TAKE 1 TABLET BY MOUTH ONCE DAILY (Patient taking differently: TAKE 1 TABLET(80mg) BY MOUTH TWICE DAILY) 90 tablet 3    gabapentin (NEURONTIN) 600 MG tablet TAKE 1 TABLET(600 MG) BY MOUTH 2 HOURS AFTER MEALS AND AT BEDTIME 360 tablet 3    guaifenesin (MUCINEX) 600 mg 12 hr tablet Take 600 mg by mouth continuous prn for Congestion. Prn        hydrocortisone 2.5 % cream Apply 1 application topically as needed.  0    isosorbide mononitrate (IMDUR) 30 MG 24 hr tablet TAKE 1 TABLET(30 MG) BY MOUTH EVERY DAY 90 tablet 3    KRILL/OM3/DHA/EPA/OM6/LIP/ASTX (KRILL OIL, OMEGA 3 & 6, ORAL) Take 2 tablets by mouth every evening.      levothyroxine (SYNTHROID) 25 MCG tablet Take 1 tablet (25 mcg total) by mouth once daily. 90 tablet 3    lipase-protease-amylase 24,000-76,000-120,000 units (CREON) 24,000-76,000 -120,000 unit capsule TAKE 2 CAPSULES BY MOUTH THREE TIMES DAILY WITH MEALS AND 1 CAPSULE BY MOUTH WITH A SNACK 210 capsule 11    losartan (COZAAR) 50 MG tablet Take 1 tablet (50 mg total) by mouth once daily. 90 tablet 3    metOLazone (ZAROXOLYN) 5 MG tablet Take 1 tablet (5 mg total) by mouth daily as  needed (Weight gain of 5 pounds or more). (Patient taking differently: Take 5 mg by mouth daily as needed (Weight gain of 5 pounds or more). ) 30 tablet 6    metoprolol tartrate (LOPRESSOR) 50 MG tablet Take 1 tablet (50 mg total) by mouth 2 (two) times daily. 60 tablet 11    mupirocin (BACTROBAN) 2 % ointment APPLY 2 TO 3 APPLICATIONS ON THE SKIN DAILY  1    MV-MN/FA/LYCOPENE/LUT/HB#178 (RAMEZ MULTIVITAMIN FOR MEN ORAL) Take by mouth.      NEVANAC 0.1 % ophthalmic suspension       nitroGLYCERIN (NITROSTAT) 0.4 MG SL tablet Place 1 tablet (0.4 mg total) under the tongue every 5 (five) minutes as needed. 25 tablet 6    olopatadine (PATADAY) 0.2 % Drop Place 1 drop into both eyes once daily.       omeg3/epa/dha/fish oil/flax/E (THERA TEARS NUTRITION ORAL) Take 1,200 mcg by mouth.      ondansetron (ZOFRAN) 4 MG tablet TAKE 1 TABLET(4 MG) BY MOUTH EVERY 8 HOURS AS NEEDED FOR NAUSEA 30 tablet 6    oxymetazoline (MUCINEX SINUS-MAX) 0.05 % nasal spray 2 sprays by Nasal route once daily.      pantoprazole (PROTONIX) 40 MG tablet TAKE 1 TABLET(40 MG) BY MOUTH TWICE DAILY 180 tablet 3    polyethylene glycol (GLYCOLAX) 17 gram/dose powder MIX AND DRINK 17 GRAMS BY MOUTH EVERY  g 6    prednisoLONE acetate (PRED FORTE) 1 % DrpS Place 1 drop into both eyes once daily.      silver sulfADIAZINE 1% (SILVADENE) 1 % cream Apply topically once daily. 85 g 3    simethicone (MYLICON) 125 MG chewable tablet Take 125 mg by mouth every 6 (six) hours as needed for Flatulence.      suvorexant (BELSOMRA) 10 mg Tab Take 1 tablet by mouth every evening. 30 tablet 0    tamsulosin (FLOMAX) 0.4 mg Cap Take 1 capsule (0.4 mg total) by mouth once daily. 90 capsule 3    triamcinolone acetonide 0.1% (KENALOG) 0.1 % cream APPLY 1 APPLICATION ON THE SKIN DAILY  0    [START ON 10/26/2018] HYDROcodone-acetaminophen (NORCO)  mg per tablet Take 1 tablet by mouth every 6 (six) hours as needed for Pain. 120 tablet 0    [START ON  11/26/2018] HYDROcodone-acetaminophen (NORCO)  mg per tablet Take 1 tablet by mouth every 6 (six) hours as needed for Pain. 120 tablet 0    [START ON 12/26/2018] HYDROcodone-acetaminophen (NORCO)  mg per tablet Take 1 tablet by mouth every 6 (six) hours as needed for Pain. 120 tablet 0     No current facility-administered medications for this visit.        Review of patient's allergies indicates:   Allergen Reactions    Cephalexin Hives and Shortness Of Breath    Penicillins Other (See Comments)     Unknown  Hardened skin    Pravastatin Other (See Comments)     myalgia    Sulfa (sulfonamide antibiotics)      Other reaction(s): Unknown    Adhesive Rash     Review of Systems   Constitutional: Negative for activity change, appetite change, chills, diaphoresis, fatigue, fever and unexpected weight change.   HENT: Negative for trouble swallowing and voice change.    Eyes: Negative for pain and visual disturbance.   Respiratory: Negative for chest tightness, shortness of breath and wheezing.    Cardiovascular: Negative for chest pain, palpitations and leg swelling.   Gastrointestinal: Negative for abdominal pain, constipation and diarrhea.   Genitourinary: Negative for difficulty urinating, frequency and urgency.   Musculoskeletal: Positive for back pain and neck pain. Negative for arthralgias, joint swelling, myalgias and neck stiffness.   Skin: Negative for rash and wound.   Neurological: Positive for headaches. Negative for dizziness, facial asymmetry, speech difficulty and light-headedness.   Hematological: Negative for adenopathy.   Psychiatric/Behavioral: Negative for agitation, behavioral problems, confusion, decreased concentration, dysphoric mood and sleep disturbance.         Objective:      Physical Exam    GENERAL: The patient is alert, oriented, pleasant, obese.   MUSCULOSKELETAL:   Gait is normal, walks without cane, on wide basis, small steps, improved balance.  Cervical spine full range  of motion in all planes.   Lumbar spine, decreased active range of motion in all planes, flexion to 90 degrees, with shooting,   Side bending and rotation to 20-25 degrees bilateral,    Positive facet loading bilateral, R>L.    Straight leg raising positive bilaterally.   Full range of motion in all joints x4 extremities.   Muscle strength 5/5 throughout x4 extremities.   No  joint laxity throughout x4 extremities.   Rt  hand  incomplete, weak, can bend all fingers but pinky and ring finger flexion is delayed.  NEUROLOGIC: Cranial nerves II through XII intact.   Deep tendon reflexes is normal, +2 in the upper and lower extremities bilaterally.   Muscle tone is normal.   Sensory is intact to light touch and pinprick throughout x 4 extremities.     Xray of Lumbar spine.   Lumbar spine demonstrates mild to moderate DJD. Marginal osteophytes seen at multiple levels.   Facet hypertrophy seen at the lower levels. Vertebral body heights are fairly well maintained.   Flexion-extension views show no evidence of instability     MRI of Lumbar spine showed ( 10/23/14):   L4-5: Circumferential disk bulge, bilateral facet arthropathy, and ligamentum flavum hypertrophy results in moderate bilateral neural foraminal narrowing    and no significant spinal canal narrowing.  L5-S1: Severe bilateral facet arthropathy and ligament flavum hypertrophy resulting in significant spinal canal or neural foraminal narrowing.   Impression:  Multilevel lumbar spondylosis as detailed above resulting in moderate bilateral neural foraminal narrowing at L4-5 and mild bilateral neural foraminal narrowing at L3-4.    Assessment:           1. Chronic low back pain with sciatica, sciatica laterality unspecified, unspecified back pain laterality    2. Chronic bilateral low back pain without sciatica    3. Lumbar radiculopathy    4. Chronic left hip pain    5. Leg weakness, bilateral    6. Chronic tension-type headache, not intractable    7. Long-term  current use of opiate analgesic    8. Diabetic peripheral neuropathy    9. DDD (degenerative disc disease), cervical    10. Chronic neck pain    11. Spondylitis, cervical    12. Primary osteoarthritis of right hand    13. Carpal tunnel syndrome of right wrist    14. Nonintractable headache, unspecified chronicity pattern, unspecified headache type        Plan:       Chronic low back pain with sciatica, sciatica laterality unspecified, unspecified back pain laterality  -     gabapentin (NEURONTIN) 600 MG tablet; TAKE 1 TABLET(600 MG) BY MOUTH 2 HOURS AFTER MEALS AND AT BEDTIME  Dispense: 360 tablet; Refill: 3  -     HYDROcodone-acetaminophen (NORCO)  mg per tablet; Take 1 tablet by mouth every 6 (six) hours as needed for Pain.  Dispense: 120 tablet; Refill: 0  -     HYDROcodone-acetaminophen (NORCO)  mg per tablet; Take 1 tablet by mouth every 6 (six) hours as needed for Pain.  Dispense: 120 tablet; Refill: 0  -     HYDROcodone-acetaminophen (NORCO)  mg per tablet; Take 1 tablet by mouth every 6 (six) hours as needed for Pain.  Dispense: 120 tablet; Refill: 0    Chronic bilateral low back pain without sciatica  -     gabapentin (NEURONTIN) 600 MG tablet; TAKE 1 TABLET(600 MG) BY MOUTH 2 HOURS AFTER MEALS AND AT BEDTIME  Dispense: 360 tablet; Refill: 3  -     HYDROcodone-acetaminophen (NORCO)  mg per tablet; Take 1 tablet by mouth every 6 (six) hours as needed for Pain.  Dispense: 120 tablet; Refill: 0  -     HYDROcodone-acetaminophen (NORCO)  mg per tablet; Take 1 tablet by mouth every 6 (six) hours as needed for Pain.  Dispense: 120 tablet; Refill: 0  -     HYDROcodone-acetaminophen (NORCO)  mg per tablet; Take 1 tablet by mouth every 6 (six) hours as needed for Pain.  Dispense: 120 tablet; Refill: 0    Lumbar radiculopathy  -     gabapentin (NEURONTIN) 600 MG tablet; TAKE 1 TABLET(600 MG) BY MOUTH 2 HOURS AFTER MEALS AND AT BEDTIME  Dispense: 360 tablet; Refill: 3  -      HYDROcodone-acetaminophen (NORCO)  mg per tablet; Take 1 tablet by mouth every 6 (six) hours as needed for Pain.  Dispense: 120 tablet; Refill: 0  -     HYDROcodone-acetaminophen (NORCO)  mg per tablet; Take 1 tablet by mouth every 6 (six) hours as needed for Pain.  Dispense: 120 tablet; Refill: 0  -     HYDROcodone-acetaminophen (NORCO)  mg per tablet; Take 1 tablet by mouth every 6 (six) hours as needed for Pain.  Dispense: 120 tablet; Refill: 0    Chronic left hip pain  -     gabapentin (NEURONTIN) 600 MG tablet; TAKE 1 TABLET(600 MG) BY MOUTH 2 HOURS AFTER MEALS AND AT BEDTIME  Dispense: 360 tablet; Refill: 3  -     HYDROcodone-acetaminophen (NORCO)  mg per tablet; Take 1 tablet by mouth every 6 (six) hours as needed for Pain.  Dispense: 120 tablet; Refill: 0  -     HYDROcodone-acetaminophen (NORCO)  mg per tablet; Take 1 tablet by mouth every 6 (six) hours as needed for Pain.  Dispense: 120 tablet; Refill: 0  -     HYDROcodone-acetaminophen (NORCO)  mg per tablet; Take 1 tablet by mouth every 6 (six) hours as needed for Pain.  Dispense: 120 tablet; Refill: 0    Leg weakness, bilateral  -     gabapentin (NEURONTIN) 600 MG tablet; TAKE 1 TABLET(600 MG) BY MOUTH 2 HOURS AFTER MEALS AND AT BEDTIME  Dispense: 360 tablet; Refill: 3  -     HYDROcodone-acetaminophen (NORCO)  mg per tablet; Take 1 tablet by mouth every 6 (six) hours as needed for Pain.  Dispense: 120 tablet; Refill: 0  -     HYDROcodone-acetaminophen (NORCO)  mg per tablet; Take 1 tablet by mouth every 6 (six) hours as needed for Pain.  Dispense: 120 tablet; Refill: 0  -     HYDROcodone-acetaminophen (NORCO)  mg per tablet; Take 1 tablet by mouth every 6 (six) hours as needed for Pain.  Dispense: 120 tablet; Refill: 0    Chronic tension-type headache, not intractable  -     gabapentin (NEURONTIN) 600 MG tablet; TAKE 1 TABLET(600 MG) BY MOUTH 2 HOURS AFTER MEALS AND AT BEDTIME  Dispense: 360 tablet;  Refill: 3  -     HYDROcodone-acetaminophen (NORCO)  mg per tablet; Take 1 tablet by mouth every 6 (six) hours as needed for Pain.  Dispense: 120 tablet; Refill: 0  -     HYDROcodone-acetaminophen (NORCO)  mg per tablet; Take 1 tablet by mouth every 6 (six) hours as needed for Pain.  Dispense: 120 tablet; Refill: 0  -     HYDROcodone-acetaminophen (NORCO)  mg per tablet; Take 1 tablet by mouth every 6 (six) hours as needed for Pain.  Dispense: 120 tablet; Refill: 0    Long-term current use of opiate analgesic  -     gabapentin (NEURONTIN) 600 MG tablet; TAKE 1 TABLET(600 MG) BY MOUTH 2 HOURS AFTER MEALS AND AT BEDTIME  Dispense: 360 tablet; Refill: 3  -     HYDROcodone-acetaminophen (NORCO)  mg per tablet; Take 1 tablet by mouth every 6 (six) hours as needed for Pain.  Dispense: 120 tablet; Refill: 0  -     HYDROcodone-acetaminophen (NORCO)  mg per tablet; Take 1 tablet by mouth every 6 (six) hours as needed for Pain.  Dispense: 120 tablet; Refill: 0  -     HYDROcodone-acetaminophen (NORCO)  mg per tablet; Take 1 tablet by mouth every 6 (six) hours as needed for Pain.  Dispense: 120 tablet; Refill: 0    Diabetic peripheral neuropathy  -     gabapentin (NEURONTIN) 600 MG tablet; TAKE 1 TABLET(600 MG) BY MOUTH 2 HOURS AFTER MEALS AND AT BEDTIME  Dispense: 360 tablet; Refill: 3  -     HYDROcodone-acetaminophen (NORCO)  mg per tablet; Take 1 tablet by mouth every 6 (six) hours as needed for Pain.  Dispense: 120 tablet; Refill: 0  -     HYDROcodone-acetaminophen (NORCO)  mg per tablet; Take 1 tablet by mouth every 6 (six) hours as needed for Pain.  Dispense: 120 tablet; Refill: 0  -     HYDROcodone-acetaminophen (NORCO)  mg per tablet; Take 1 tablet by mouth every 6 (six) hours as needed for Pain.  Dispense: 120 tablet; Refill: 0    DDD (degenerative disc disease), cervical  -     gabapentin (NEURONTIN) 600 MG tablet; TAKE 1 TABLET(600 MG) BY MOUTH 2 HOURS AFTER MEALS  AND AT BEDTIME  Dispense: 360 tablet; Refill: 3  -     HYDROcodone-acetaminophen (NORCO)  mg per tablet; Take 1 tablet by mouth every 6 (six) hours as needed for Pain.  Dispense: 120 tablet; Refill: 0  -     HYDROcodone-acetaminophen (NORCO)  mg per tablet; Take 1 tablet by mouth every 6 (six) hours as needed for Pain.  Dispense: 120 tablet; Refill: 0  -     HYDROcodone-acetaminophen (NORCO)  mg per tablet; Take 1 tablet by mouth every 6 (six) hours as needed for Pain.  Dispense: 120 tablet; Refill: 0    Chronic neck pain  -     gabapentin (NEURONTIN) 600 MG tablet; TAKE 1 TABLET(600 MG) BY MOUTH 2 HOURS AFTER MEALS AND AT BEDTIME  Dispense: 360 tablet; Refill: 3  -     HYDROcodone-acetaminophen (NORCO)  mg per tablet; Take 1 tablet by mouth every 6 (six) hours as needed for Pain.  Dispense: 120 tablet; Refill: 0  -     HYDROcodone-acetaminophen (NORCO)  mg per tablet; Take 1 tablet by mouth every 6 (six) hours as needed for Pain.  Dispense: 120 tablet; Refill: 0  -     HYDROcodone-acetaminophen (NORCO)  mg per tablet; Take 1 tablet by mouth every 6 (six) hours as needed for Pain.  Dispense: 120 tablet; Refill: 0    Spondylitis, cervical  -     gabapentin (NEURONTIN) 600 MG tablet; TAKE 1 TABLET(600 MG) BY MOUTH 2 HOURS AFTER MEALS AND AT BEDTIME  Dispense: 360 tablet; Refill: 3  -     HYDROcodone-acetaminophen (NORCO)  mg per tablet; Take 1 tablet by mouth every 6 (six) hours as needed for Pain.  Dispense: 120 tablet; Refill: 0  -     HYDROcodone-acetaminophen (NORCO)  mg per tablet; Take 1 tablet by mouth every 6 (six) hours as needed for Pain.  Dispense: 120 tablet; Refill: 0  -     HYDROcodone-acetaminophen (NORCO)  mg per tablet; Take 1 tablet by mouth every 6 (six) hours as needed for Pain.  Dispense: 120 tablet; Refill: 0    Primary osteoarthritis of right hand  -     gabapentin (NEURONTIN) 600 MG tablet; TAKE 1 TABLET(600 MG) BY MOUTH 2 HOURS AFTER MEALS  AND AT BEDTIME  Dispense: 360 tablet; Refill: 3  -     HYDROcodone-acetaminophen (NORCO)  mg per tablet; Take 1 tablet by mouth every 6 (six) hours as needed for Pain.  Dispense: 120 tablet; Refill: 0  -     HYDROcodone-acetaminophen (NORCO)  mg per tablet; Take 1 tablet by mouth every 6 (six) hours as needed for Pain.  Dispense: 120 tablet; Refill: 0  -     HYDROcodone-acetaminophen (NORCO)  mg per tablet; Take 1 tablet by mouth every 6 (six) hours as needed for Pain.  Dispense: 120 tablet; Refill: 0    Carpal tunnel syndrome of right wrist  -     HYDROcodone-acetaminophen (NORCO)  mg per tablet; Take 1 tablet by mouth every 6 (six) hours as needed for Pain.  Dispense: 120 tablet; Refill: 0  -     HYDROcodone-acetaminophen (NORCO)  mg per tablet; Take 1 tablet by mouth every 6 (six) hours as needed for Pain.  Dispense: 120 tablet; Refill: 0  -     HYDROcodone-acetaminophen (NORCO)  mg per tablet; Take 1 tablet by mouth every 6 (six) hours as needed for Pain.  Dispense: 120 tablet; Refill: 0    Nonintractable headache, unspecified chronicity pattern, unspecified headache type  -     HYDROcodone-acetaminophen (NORCO)  mg per tablet; Take 1 tablet by mouth every 6 (six) hours as needed for Pain.  Dispense: 120 tablet; Refill: 0  -     HYDROcodone-acetaminophen (NORCO)  mg per tablet; Take 1 tablet by mouth every 6 (six) hours as needed for Pain.  Dispense: 120 tablet; Refill: 0  -     HYDROcodone-acetaminophen (NORCO)  mg per tablet; Take 1 tablet by mouth every 6 (six) hours as needed for Pain.  Dispense: 120 tablet; Refill: 0    Patient with worsening of chronic back pain, secondary to lumbar spondylosis, facet arthropathy, and possible Rt L5-S1 lumbar radiculopathy.  Also with worsening of Headache that is responding to Fioricet well.    1. Pain management:   Hydrocodone 10/325 mg po q6 hrs prn pain, decreased monthly and daily dose from #150/months or 5  tabs/day down to #120 ( max 4 tabs/day) with 2 refills.  He is not happy but agreeable.  We had an extended talk about further decrease in opioid therapy, and increase activity, to improve function.   Discussed eight loss, and core strengthening (water therapy, stationary biking and walking seems to be the best exercise for him).  Will resume  Neurontin 600 mg po tid- qid if tolerated ( one in am / noon/ at 2 at bedtime).    2. Safe opiate management:  Opiate risk tool completed, moderate risk level , at 6.   reviewed and appropriate.   Hydrocodone refills on 9/26, 8/28, 7/25/18.  Fioricet #90 tabs, 10/6, 8/21, 7/21/18.  Urine drug screen one on 12/21/17 ( + for opiates).    3. Disable parking tag, permanent renewal.     Follow up in 4 months.     Total time spent face to face with patient was 25 minutes.   More than 50% of that time was spent in counseling on diagnosis , prognosis and treatment options.   I also  patient  on common and most usual side effect of prescribed medications. Risk and benefits of opiates, possible risk of developing opiate dependence and tolerance, need of strict compliance with prescribed medications.  Pain contract, rules and obligations were discussed with patient in details.  He is aware that I would be the only doctor prescribing him pain medications and ED in a case of emergency.  I reviewed Primary care , and other specialty's notes to better coordinate patient's  care.   All questions were answered, and patient voiced understanding.

## 2018-10-31 RX ORDER — NAPROXEN SODIUM 220 MG
TABLET ORAL
Qty: 400 EACH | Refills: 0 | Status: SHIPPED | OUTPATIENT
Start: 2018-10-31 | End: 2019-01-04 | Stop reason: ALTCHOICE

## 2018-11-05 ENCOUNTER — TELEPHONE (OUTPATIENT)
Dept: ELECTROPHYSIOLOGY | Facility: CLINIC | Age: 68
End: 2018-11-05

## 2018-11-05 DIAGNOSIS — I15.9 SECONDARY HYPERTENSION: ICD-10-CM

## 2018-11-05 DIAGNOSIS — I50.30 HEART FAILURE WITH PRESERVED LEFT VENTRICULAR FUNCTION (HFPEF): ICD-10-CM

## 2018-12-05 DIAGNOSIS — Z79.891 LONG-TERM CURRENT USE OF OPIATE ANALGESIC: ICD-10-CM

## 2018-12-05 DIAGNOSIS — M25.552 CHRONIC LEFT HIP PAIN: ICD-10-CM

## 2018-12-05 DIAGNOSIS — R51.9 NONINTRACTABLE HEADACHE, UNSPECIFIED CHRONICITY PATTERN, UNSPECIFIED HEADACHE TYPE: ICD-10-CM

## 2018-12-05 DIAGNOSIS — M54.2 CHRONIC NECK PAIN: ICD-10-CM

## 2018-12-05 DIAGNOSIS — G89.29 CHRONIC LEFT HIP PAIN: ICD-10-CM

## 2018-12-05 DIAGNOSIS — M50.30 DDD (DEGENERATIVE DISC DISEASE), CERVICAL: ICD-10-CM

## 2018-12-05 DIAGNOSIS — G89.29 CHRONIC NECK PAIN: ICD-10-CM

## 2018-12-05 DIAGNOSIS — G44.229 CHRONIC TENSION-TYPE HEADACHE, NOT INTRACTABLE: ICD-10-CM

## 2018-12-05 DIAGNOSIS — G89.29 CHRONIC BILATERAL LOW BACK PAIN WITHOUT SCIATICA: ICD-10-CM

## 2018-12-05 DIAGNOSIS — R29.898 LEG WEAKNESS, BILATERAL: ICD-10-CM

## 2018-12-05 DIAGNOSIS — M19.041 PRIMARY OSTEOARTHRITIS OF RIGHT HAND: ICD-10-CM

## 2018-12-05 DIAGNOSIS — M46.92 SPONDYLITIS, CERVICAL: ICD-10-CM

## 2018-12-05 DIAGNOSIS — M54.50 CHRONIC BILATERAL LOW BACK PAIN WITHOUT SCIATICA: ICD-10-CM

## 2018-12-05 DIAGNOSIS — G89.29 CHRONIC LOW BACK PAIN WITH SCIATICA, SCIATICA LATERALITY UNSPECIFIED, UNSPECIFIED BACK PAIN LATERALITY: ICD-10-CM

## 2018-12-05 DIAGNOSIS — G56.01 CARPAL TUNNEL SYNDROME OF RIGHT WRIST: ICD-10-CM

## 2018-12-05 DIAGNOSIS — E11.42 DIABETIC PERIPHERAL NEUROPATHY: ICD-10-CM

## 2018-12-05 DIAGNOSIS — M54.16 LUMBAR RADICULOPATHY: ICD-10-CM

## 2018-12-05 DIAGNOSIS — M54.40 CHRONIC LOW BACK PAIN WITH SCIATICA, SCIATICA LATERALITY UNSPECIFIED, UNSPECIFIED BACK PAIN LATERALITY: ICD-10-CM

## 2018-12-07 RX ORDER — BUTALBITAL, ACETAMINOPHEN AND CAFFEINE 50; 325; 40 MG/1; MG/1; MG/1
1 TABLET ORAL EVERY 4 HOURS PRN
Qty: 90 TABLET | Refills: 0 | Status: SHIPPED | OUTPATIENT
Start: 2018-12-07 | End: 2019-01-02 | Stop reason: SDUPTHER

## 2018-12-19 PROBLEM — I20.9 ANGINA, CLASS II: Status: ACTIVE | Noted: 2018-12-19

## 2018-12-19 PROBLEM — I25.118 CORONARY ARTERY DISEASE OF NATIVE ARTERY OF NATIVE HEART WITH STABLE ANGINA PECTORIS: Status: ACTIVE | Noted: 2018-02-15

## 2018-12-19 PROBLEM — Z95.1 S/P CABG (CORONARY ARTERY BYPASS GRAFT): Status: ACTIVE | Noted: 2018-12-19

## 2019-01-02 DIAGNOSIS — G89.29 CHRONIC LEFT HIP PAIN: ICD-10-CM

## 2019-01-02 DIAGNOSIS — M54.16 LUMBAR RADICULOPATHY: ICD-10-CM

## 2019-01-02 DIAGNOSIS — M19.041 PRIMARY OSTEOARTHRITIS OF RIGHT HAND: ICD-10-CM

## 2019-01-02 DIAGNOSIS — M25.552 CHRONIC LEFT HIP PAIN: ICD-10-CM

## 2019-01-02 DIAGNOSIS — M50.30 DDD (DEGENERATIVE DISC DISEASE), CERVICAL: ICD-10-CM

## 2019-01-02 DIAGNOSIS — G89.29 CHRONIC BILATERAL LOW BACK PAIN WITHOUT SCIATICA: ICD-10-CM

## 2019-01-02 DIAGNOSIS — G89.29 CHRONIC NECK PAIN: ICD-10-CM

## 2019-01-02 DIAGNOSIS — M54.50 CHRONIC BILATERAL LOW BACK PAIN WITHOUT SCIATICA: ICD-10-CM

## 2019-01-02 DIAGNOSIS — R51.9 NONINTRACTABLE HEADACHE, UNSPECIFIED CHRONICITY PATTERN, UNSPECIFIED HEADACHE TYPE: ICD-10-CM

## 2019-01-02 DIAGNOSIS — E11.42 DIABETIC PERIPHERAL NEUROPATHY: ICD-10-CM

## 2019-01-02 DIAGNOSIS — G89.29 CHRONIC LOW BACK PAIN WITH SCIATICA, SCIATICA LATERALITY UNSPECIFIED, UNSPECIFIED BACK PAIN LATERALITY: ICD-10-CM

## 2019-01-02 DIAGNOSIS — M46.92 SPONDYLITIS, CERVICAL: ICD-10-CM

## 2019-01-02 DIAGNOSIS — M54.2 CHRONIC NECK PAIN: ICD-10-CM

## 2019-01-02 DIAGNOSIS — R29.898 LEG WEAKNESS, BILATERAL: ICD-10-CM

## 2019-01-02 DIAGNOSIS — M54.40 CHRONIC LOW BACK PAIN WITH SCIATICA, SCIATICA LATERALITY UNSPECIFIED, UNSPECIFIED BACK PAIN LATERALITY: ICD-10-CM

## 2019-01-02 DIAGNOSIS — G56.01 CARPAL TUNNEL SYNDROME OF RIGHT WRIST: ICD-10-CM

## 2019-01-02 DIAGNOSIS — Z79.891 LONG-TERM CURRENT USE OF OPIATE ANALGESIC: ICD-10-CM

## 2019-01-02 DIAGNOSIS — G44.229 CHRONIC TENSION-TYPE HEADACHE, NOT INTRACTABLE: ICD-10-CM

## 2019-01-03 RX ORDER — BUTALBITAL, ACETAMINOPHEN AND CAFFEINE 50; 325; 40 MG/1; MG/1; MG/1
1 TABLET ORAL EVERY 4 HOURS PRN
Qty: 90 TABLET | Refills: 0 | Status: SHIPPED | OUTPATIENT
Start: 2019-01-03 | End: 2019-04-17 | Stop reason: SDUPTHER

## 2019-01-11 ENCOUNTER — TELEPHONE (OUTPATIENT)
Dept: PHYSICAL MEDICINE AND REHAB | Facility: CLINIC | Age: 69
End: 2019-01-11

## 2019-01-11 NOTE — TELEPHONE ENCOUNTER
Pt switched from [employer] to [employer] in the new year.  Called for insurance information to fill out PA.    ----- Message from Hayes Handy sent at 1/11/2019 11:06 AM CST -----  Needs Advice    Reason for call: Pt is calling to check status of PA for HYDROcodone-acetaminophen (NORCO)  mg per tablet. Pt states [place] faxed over PA request.        Communication Preference: [phone]    Additional Information:

## 2019-01-11 NOTE — TELEPHONE ENCOUNTER
Called pharmacy to ask about PA.  They stated that the pt was only approved for 7d, and that they would send over that information.    ----- Message from Bryson Morris sent at 1/11/2019 12:20 PM CST -----  Contact: Pharmacy   Caller requesting a return call regarding a prior authorization for (HYDROcodone-acetaminophen (NORCO)  mg per tablet )  pls return call     [id number] - [place], [place] - [address] [place] [address]  Phone: [phone] Fax: [phone]

## 2019-01-14 ENCOUNTER — TELEPHONE (OUTPATIENT)
Dept: PHYSICAL MEDICINE AND REHAB | Facility: CLINIC | Age: 69
End: 2019-01-14

## 2019-01-14 NOTE — TELEPHONE ENCOUNTER
"Fax received with Vinh Castro's new insurance card.  Re-filled out the covermymeds PA and sent.  PA currently shown as "received".  Key [id number].  Fax with insurance info put into batch for scanning into the system.  "

## 2019-01-14 NOTE — TELEPHONE ENCOUNTER
Left a message that insurance card would have to be faxed or brought in.  Left fax and phone number for the office.

## 2019-01-31 ENCOUNTER — TELEPHONE (OUTPATIENT)
Dept: PHYSICAL MEDICINE AND REHAB | Facility: CLINIC | Age: 69
End: 2019-01-31

## 2019-01-31 NOTE — TELEPHONE ENCOUNTER
----- Message from Kika Mauricio MA sent at 1/31/2019  1:49 PM CST -----  On the doctor's desk waiting for a signature.     ----- Message -----  From: Hayes Handy  Sent: 1/31/2019   9:59 AM  To: Khadra Wilkerson Staff    Needs Advice    Reason for call: Pt is calling to confirm status of PT plan of care that pt said was submitted a month ago. Pt is asking for a call back today.        Communication Preference: [phone]    Additional Information:

## 2019-02-03 DIAGNOSIS — I10 ESSENTIAL HYPERTENSION: ICD-10-CM

## 2019-02-03 DIAGNOSIS — I50.30 HEART FAILURE WITH PRESERVED EJECTION FRACTION: ICD-10-CM

## 2019-02-04 ENCOUNTER — TELEPHONE (OUTPATIENT)
Dept: PHYSICAL MEDICINE AND REHAB | Facility: CLINIC | Age: 69
End: 2019-02-04

## 2019-02-04 PROBLEM — I87.2 VENOUS STASIS DERMATITIS OF BOTH LOWER EXTREMITIES: Status: ACTIVE | Noted: 2019-02-04

## 2019-02-04 RX ORDER — FUROSEMIDE 40 MG/1
TABLET ORAL
Qty: 90 TABLET | Refills: 0 | Status: SHIPPED | OUTPATIENT
Start: 2019-02-04 | End: 2019-02-22 | Stop reason: CLARIF

## 2019-02-04 NOTE — TELEPHONE ENCOUNTER
----- Message from Bryson Morris sent at 2/4/2019  2:10 PM CST -----  Contact: Patient @ [phone]  Patient calling to get an update on the signed order/letter for PT, pls call to advise

## 2019-02-04 NOTE — TELEPHONE ENCOUNTER
----- Message from Hayesannemarie Handy sent at 2/4/2019  2:35 PM CST -----  Needs Advice    Reason for call: [facility] states she is faxing over plan of care for 10/2018. Pls sign and fax back to fax number on sheet. Pt is scheduled to see them tomorrow.        Communication Preference: Tatyana w/ MERLY Cowan @ [phone]    Additional Information:

## 2019-02-05 ENCOUNTER — TELEPHONE (OUTPATIENT)
Dept: PHYSICAL MEDICINE AND REHAB | Facility: CLINIC | Age: 69
End: 2019-02-05

## 2019-02-05 PROBLEM — Z79.899 POLYPHARMACY: Status: ACTIVE | Noted: 2019-02-05

## 2019-02-05 NOTE — TELEPHONE ENCOUNTER
----- Message from Rene Ellison sent at 2/1/2019  4:36 PM CST -----  Contact: pt   Please call   ----- Message -----  From: Rachele Austin  Sent: 2/1/2019   3:32 PM  To: Khadra Wilkerson Staff    Needs Advice    Reason for call: pt is calling to speak with the nurse pt said Dr Gaviria is suppose to sign some paper pt said it was suppose to be sent to [facility] in [place] [phone] they haven't received the paper work yet they have been trying to get it for two months pt said he can't go back until they get this straightened out         Communication Preference: can you please call pt at [phone]    Additional Information: none      [place]

## 2019-02-05 NOTE — TELEPHONE ENCOUNTER
----- Message from Hayes Handy sent at 2/5/2019 10:00 AM CST -----  Needs Advice     Reason for call: Pt is asking the doctor sign paperwork dated 01/2018 for PT that was faxed yesterday from [facility].        Communication Preference: [phone]     Additional Information:

## 2019-02-26 PROBLEM — R12 HEARTBURN: Status: ACTIVE | Noted: 2019-02-26

## 2019-03-04 DIAGNOSIS — M25.552 CHRONIC LEFT HIP PAIN: ICD-10-CM

## 2019-03-04 DIAGNOSIS — R51.9 NONINTRACTABLE HEADACHE, UNSPECIFIED CHRONICITY PATTERN, UNSPECIFIED HEADACHE TYPE: ICD-10-CM

## 2019-03-04 DIAGNOSIS — M46.92 SPONDYLITIS, CERVICAL: ICD-10-CM

## 2019-03-04 DIAGNOSIS — M54.16 LUMBAR RADICULOPATHY: ICD-10-CM

## 2019-03-04 DIAGNOSIS — M54.2 CHRONIC NECK PAIN: ICD-10-CM

## 2019-03-04 DIAGNOSIS — G89.29 CHRONIC LOW BACK PAIN WITH SCIATICA, SCIATICA LATERALITY UNSPECIFIED, UNSPECIFIED BACK PAIN LATERALITY: ICD-10-CM

## 2019-03-04 DIAGNOSIS — M50.30 DDD (DEGENERATIVE DISC DISEASE), CERVICAL: ICD-10-CM

## 2019-03-04 DIAGNOSIS — M54.50 CHRONIC BILATERAL LOW BACK PAIN WITHOUT SCIATICA: ICD-10-CM

## 2019-03-04 DIAGNOSIS — G89.29 CHRONIC NECK PAIN: ICD-10-CM

## 2019-03-04 DIAGNOSIS — G44.229 CHRONIC TENSION-TYPE HEADACHE, NOT INTRACTABLE: ICD-10-CM

## 2019-03-04 DIAGNOSIS — G89.29 CHRONIC LEFT HIP PAIN: ICD-10-CM

## 2019-03-04 DIAGNOSIS — Z79.891 LONG-TERM CURRENT USE OF OPIATE ANALGESIC: ICD-10-CM

## 2019-03-04 DIAGNOSIS — G89.29 CHRONIC BILATERAL LOW BACK PAIN WITHOUT SCIATICA: ICD-10-CM

## 2019-03-04 DIAGNOSIS — M19.041 PRIMARY OSTEOARTHRITIS OF RIGHT HAND: ICD-10-CM

## 2019-03-04 DIAGNOSIS — G56.01 CARPAL TUNNEL SYNDROME OF RIGHT WRIST: ICD-10-CM

## 2019-03-04 DIAGNOSIS — R29.898 LEG WEAKNESS, BILATERAL: ICD-10-CM

## 2019-03-04 DIAGNOSIS — M54.40 CHRONIC LOW BACK PAIN WITH SCIATICA, SCIATICA LATERALITY UNSPECIFIED, UNSPECIFIED BACK PAIN LATERALITY: ICD-10-CM

## 2019-03-04 DIAGNOSIS — E11.42 DIABETIC PERIPHERAL NEUROPATHY: ICD-10-CM

## 2019-03-04 NOTE — TELEPHONE ENCOUNTER
Last Visit: 10/19/2018  Canceled/No Show Visit: 02/25/2019  Next Visit: 04/17/2019 Wait listed  Last Refill: 12/26/2018    ----- Message from Hayes Handy sent at 3/4/2019  8:38 AM CST -----  Rx Refill/Request     Is this a Refill or New Rx: Refill    Rx Name and Strength: HYDROcodone-acetaminophen (NORCO)  mg per tablet    Preferred Pharmacy with phone number: see below  Communication Preference: [phone]  Additional Information:

## 2019-03-07 ENCOUNTER — TELEPHONE (OUTPATIENT)
Dept: PHYSICAL MEDICINE AND REHAB | Facility: CLINIC | Age: 69
End: 2019-03-07

## 2019-03-07 RX ORDER — HYDROCODONE BITARTRATE AND ACETAMINOPHEN 10; 325 MG/1; MG/1
1 TABLET ORAL EVERY 6 HOURS PRN
Qty: 120 TABLET | Refills: 0 | Status: SHIPPED | OUTPATIENT
Start: 2019-03-07 | End: 2019-04-17 | Stop reason: SDUPTHER

## 2019-03-07 NOTE — TELEPHONE ENCOUNTER
Called pt per Dr Gaviria's request to inform him that she would have a look at his refill today, but that he needs to call in advance of the refill being needed.  Informed him that the pain contract asks pt's request refills 5 days in advance.  Pt says he has been calling for three weeks.  Informed him I only saw the refill request from 03/04/2019.

## 2019-04-17 ENCOUNTER — OFFICE VISIT (OUTPATIENT)
Dept: PHYSICAL MEDICINE AND REHAB | Facility: CLINIC | Age: 69
End: 2019-04-17
Payer: MEDICARE

## 2019-04-17 VITALS
DIASTOLIC BLOOD PRESSURE: 61 MMHG | HEART RATE: 71 BPM | BODY MASS INDEX: 41.34 KG/M2 | WEIGHT: 272.81 LBS | SYSTOLIC BLOOD PRESSURE: 137 MMHG | HEIGHT: 68 IN

## 2019-04-17 DIAGNOSIS — Z79.891 LONG-TERM CURRENT USE OF OPIATE ANALGESIC: ICD-10-CM

## 2019-04-17 DIAGNOSIS — M54.16 LUMBAR RADICULOPATHY: ICD-10-CM

## 2019-04-17 DIAGNOSIS — G44.229 CHRONIC TENSION-TYPE HEADACHE, NOT INTRACTABLE: ICD-10-CM

## 2019-04-17 DIAGNOSIS — G89.29 CHRONIC LEFT HIP PAIN: ICD-10-CM

## 2019-04-17 DIAGNOSIS — G89.29 CHRONIC BILATERAL LOW BACK PAIN WITHOUT SCIATICA: ICD-10-CM

## 2019-04-17 DIAGNOSIS — G56.01 CARPAL TUNNEL SYNDROME OF RIGHT WRIST: ICD-10-CM

## 2019-04-17 DIAGNOSIS — M54.50 CHRONIC BILATERAL LOW BACK PAIN WITHOUT SCIATICA: ICD-10-CM

## 2019-04-17 DIAGNOSIS — M54.2 CHRONIC NECK PAIN: ICD-10-CM

## 2019-04-17 DIAGNOSIS — M19.041 PRIMARY OSTEOARTHRITIS OF RIGHT HAND: ICD-10-CM

## 2019-04-17 DIAGNOSIS — M50.30 DDD (DEGENERATIVE DISC DISEASE), CERVICAL: ICD-10-CM

## 2019-04-17 DIAGNOSIS — M25.552 CHRONIC LEFT HIP PAIN: ICD-10-CM

## 2019-04-17 DIAGNOSIS — R51.9 NONINTRACTABLE HEADACHE, UNSPECIFIED CHRONICITY PATTERN, UNSPECIFIED HEADACHE TYPE: ICD-10-CM

## 2019-04-17 DIAGNOSIS — R29.898 LEG WEAKNESS, BILATERAL: ICD-10-CM

## 2019-04-17 DIAGNOSIS — Z79.891 OPIOID USE AGREEMENT EXISTS: ICD-10-CM

## 2019-04-17 DIAGNOSIS — M54.40 CHRONIC LOW BACK PAIN WITH SCIATICA, SCIATICA LATERALITY UNSPECIFIED, UNSPECIFIED BACK PAIN LATERALITY: Primary | ICD-10-CM

## 2019-04-17 DIAGNOSIS — G89.29 CHRONIC LOW BACK PAIN WITH SCIATICA, SCIATICA LATERALITY UNSPECIFIED, UNSPECIFIED BACK PAIN LATERALITY: Primary | ICD-10-CM

## 2019-04-17 DIAGNOSIS — M46.92 SPONDYLITIS, CERVICAL: ICD-10-CM

## 2019-04-17 DIAGNOSIS — E11.42 DIABETIC PERIPHERAL NEUROPATHY: ICD-10-CM

## 2019-04-17 DIAGNOSIS — G89.29 CHRONIC NECK PAIN: ICD-10-CM

## 2019-04-17 PROCEDURE — 99214 OFFICE O/P EST MOD 30 MIN: CPT | Mod: S$PBB,,, | Performed by: PHYSICAL MEDICINE & REHABILITATION

## 2019-04-17 PROCEDURE — 99213 OFFICE O/P EST LOW 20 MIN: CPT | Mod: PBBFAC | Performed by: PHYSICAL MEDICINE & REHABILITATION

## 2019-04-17 PROCEDURE — 99999 PR PBB SHADOW E&M-EST. PATIENT-LVL III: ICD-10-PCS | Mod: PBBFAC,,, | Performed by: PHYSICAL MEDICINE & REHABILITATION

## 2019-04-17 PROCEDURE — 99999 PR PBB SHADOW E&M-EST. PATIENT-LVL III: CPT | Mod: PBBFAC,,, | Performed by: PHYSICAL MEDICINE & REHABILITATION

## 2019-04-17 PROCEDURE — 99214 PR OFFICE/OUTPT VISIT, EST, LEVL IV, 30-39 MIN: ICD-10-PCS | Mod: S$PBB,,, | Performed by: PHYSICAL MEDICINE & REHABILITATION

## 2019-04-17 RX ORDER — BUTALBITAL, ACETAMINOPHEN AND CAFFEINE 50; 325; 40 MG/1; MG/1; MG/1
1 TABLET ORAL EVERY 4 HOURS PRN
Qty: 90 TABLET | Refills: 2 | Status: SHIPPED | OUTPATIENT
Start: 2019-04-17 | End: 2019-05-17

## 2019-04-17 RX ORDER — HYDROCODONE BITARTRATE AND ACETAMINOPHEN 10; 325 MG/1; MG/1
1 TABLET ORAL EVERY 6 HOURS PRN
Qty: 120 TABLET | Refills: 0 | Status: SHIPPED | OUTPATIENT
Start: 2019-04-17 | End: 2019-05-17

## 2019-04-17 RX ORDER — HYDROCODONE BITARTRATE AND ACETAMINOPHEN 10; 325 MG/1; MG/1
1 TABLET ORAL EVERY 6 HOURS PRN
Qty: 120 TABLET | Refills: 0 | Status: SHIPPED | OUTPATIENT
Start: 2019-06-17 | End: 2019-07-17

## 2019-04-17 RX ORDER — HYDROCODONE BITARTRATE AND ACETAMINOPHEN 10; 325 MG/1; MG/1
1 TABLET ORAL EVERY 6 HOURS PRN
Qty: 120 TABLET | Refills: 0 | Status: SHIPPED | OUTPATIENT
Start: 2019-05-17 | End: 2019-06-16

## 2019-04-17 NOTE — PROGRESS NOTES
Subjective:       Patient ID: Vinh Castro is a 68 y.o. male.    Chief Complaint: Back Pain and Neck Pain    Mr. Castro is 67 y/o male who returns to clinic for chronic neck, both hands and low back pain.    LCV  10/19/18.    Today he reports:  #1 Neck and  Both hands pain.   Current pain is 3/10, the worst pain is 7/10.   He reports more hand pain than neck pain.   Neck feels as stiffness rather than pain.   But both hands are swelling, and became very hard to close them in a fist, Rt > Lt hand.  Not that much of pain, how much is partial hand weakness in pinky and ring finger flexion.  No numbness, no burning sensation.  The weakness he is showing is more c/w tenosynovitis.  He has DM2 polyneuropathy.  He states that he was told at some time that he might have a CTS, and a trigger finger.  Has brace at home, but they do not help.   Things are dropping out of his hand.  Scheduled for EMG.      #2 Low Back pain.  Current low back pain is 7/10, the worst pain 8/10.   Back pain is now localized in Rt buttock, and pain does not run any more down the RT leg.  Pain in Rt buttock, he describes as disabling, and intense sharp, shooting pain down the leg, and also as a  burning pain.   He cannot to bend leg , it hurts to wear a shoes, he does that in sitting position, but he cannot bend Rt leg to place a shoe on foot.  He has diabetic polyneuropathy.  He reports that back pain is off/on,shooting and  burning pain.   Sitting, makes his back pain worse, lying down improves the pain.  Also he has a pain with walking, able to walk  ~ 1 blocks.  No true weakness in legs, no numbness, paraesthesias.  No BB incontinence., no saddle anesthesia.  He takes Gabapentin 600 mg po QID, that is helping, and Hydrocodone 10/325 mg, takes 4 tabs/day.  Tolerates well Hydrocodone,and wants to continue the same medications.   He had a Physical therapy, but does not exercises at home, has sedentary lifestyle.    #3 Headache  He is getting a  significant HA.  That feels as tension headache ( around head).  They are not predictable, more frequent lately, starting at back of head and go around head.   It is a severe, intense pain, mainly during day time.  No associated symptoms, no N/V, no aura, no eye problems, nor balance nor gait problems.   He was given Fioricet in past and that seems to help better than Hydrocodone.    Here for follow up, and chronic pain management with opiates.    Past Medical History:   Diagnosis Date    *Atrial fibrillation     Acquired hypothyroidism 5/4/2016    Allergic sinusitis     Anemia     Anticoagulant long-term use     Arthritis     BPH (benign prostatic hyperplasia)     C. difficile diarrhea 03/2014    CAD (coronary artery disease)     CABG 2002, PCI    CHF (congestive heart failure)     Chronic anticoagulation     Cirrhosis     Cirrhosis of liver 01/08/2017    Colon polyps     Coronary artery disease     Depression     Diabetic nephropathy     Diabetic retinopathy associated with type 2 diabetes mellitus     Diverticulosis     Dry eyes     GERD (gastroesophageal reflux disease)     History of BPH     History of peripheral vascular disease     Bilateral Carotid Artery Stenosis < 40% on Carotid US 7/2012    Saxman (hard of hearing)     Hyperlipidemia     Hypertension     Kidney function abnormal     MI (myocardial infarction) 2001    Multiple gastric ulcers     Obesity     SEAN (obstructive sleep apnea)     uses C-PAP    Pancreatic insufficiency     Personal history of kidney stones     PN (peripheral neuropathy)     Renal insufficiency     Syncope 5/23/2016    Type 2 diabetes mellitus not at goal     Uses hearing aid     Venous stasis        Past Surgical History:   Procedure Laterality Date    ABLATION N/A 8/24/2015    [facility] CATH LAB    ABLATION N/A 8/27/2014    [facility] at [facility] CATH LAB    ARTHROSCOPY-KNEE Left 6/3/2014     [facility] at [facility] OR    CATARACT EXTRACTION BILATERAL W/ ANTERIOR VITRECTOMY      CHOLECYSTECTOMY      CHONDROPLASTY-KNEE Left 6/3/2014    [facility] at [facility] OR    COLONOSCOPY      COLONOSCOPY N/A 2/26/2019    [facility] ENDO    COLONOSCOPY N/A 7/21/2017    Performed by Roberto [facility]otelo [facility] at [facility] ENDO (4TH FLR)    CORONARY ANGIOPLASTY  2014    Patent LIMA to LAD, SVG to OM, occluded LAD, 60% Circ-x1cv stent placed    CORONARY ANGIOPLASTY WITH STENT PLACEMENT      CORONARY ARTERY BYPASS GRAFT  2001    iyer LAD SVG OM1    DEBRIDEMENT-KNEE Left 6/3/2014    [facility] by [facility] at [facility] OR    EGD (ESOPHAGOGASTRODUODENOSCOPY) N/A 2/26/2019    Performed by Lee Rivera [facility] at [facility] ENDO    EGD (ESOPHAGOGASTRODUODENOSCOPY) N/A 4/4/2014    Performed by Rick Martinez MD at [facility] ENDO (4TH FLR)    ESOPHAGOGASTRODUODENOSCOPY (EGD) N/A 7/21/2017    Performed by Roberto [facility]elo [facility] at [facility] ENDO (4TH FLR)    ESOPHAGOGASTRODUODENOSCOPY (EGD) N/A 2/6/2017    Performed by Shimon Salazar MD at [facility] ENDO (4TH FLR)    EXCISION-PLICA Left 6/3/2014    [facility] at [facility] OR    EYE SURGERY      retina repair    HAND SURGERY      LOST THREE FINGERS/left 1979    HEART CATH-LEFT Left 12/5/2014    Performed by Miguel A Mccarthy MD at [facility] CATH LAB    HERNIA REPAIR      UMB, BILAT ING    INSERTION OF IMPLANTABLE LOOP RECORDER      KNEE SURGERY Left     ATS    Left heart cath N/A 4/3/2019    Performed by Chris [facility]bone [facility] at [facility] CATH    Left heart cath N/A 12/19/2018    Performed by Chris [facility]bone [facility] at [facility] CATH    MENISCECTOMY med/lat  Left 6/3/2014    [facility] at [facility] OR    PLACEMENT-LOOP RECORDER N/A 5/24/2016    Per[facility] by [facility] at [facility] CATH LAB    RADIOFREQUENCY ABLATION  8/14    atrial flutter    RETINAL LASER PROCEDURE      SKIN BIOPSY      head     TONSILLECTOMY      TRANSESOPHAGEAL ECHOCARDIOGRAM (LILI) N/A 8/24/2015    Performed by [facility] at [facility] CATH LAB    ULTRASOUND-ENDOSCOPIC-UPPER N/A 2/23/2015    Performed by Nikita Hendrix MD at [facility] ENDO (2ND FLR)    UPPER GASTROINTESTINAL ENDOSCOPY         Family History   Problem Relation Age of Onset    Cancer Mother         Rhumatoid lung disease    Diabetes Father     Heart attack Father     Heart disease Father     Cancer Father         Lymphona    Obesity Sister     Diabetes Paternal Grandfather        Social History     Socioeconomic History    Marital status:      Spouse name: Sandra    Number of children: 1    Years of education: Not on file    Highest education level: Not on file   Occupational History    Not on file   Social Needs    Financial resource strain: Not on file    Food insecurity:     Worry: Not on file     Inability: Not on file    Transportation needs:     Medical: Not on file     Non-medical: Not on file   Tobacco Use    Smoking status: Never Smoker    Smokeless tobacco: Never Used   Substance and Sexual Activity    Alcohol use: No    Drug use: No    Sexual activity: Never     Partners: Female   Lifestyle    Physical activity:     Days per week: Not on file     Minutes per session: Not on file    Stress: Not on file   Relationships    Social connections:     Talks on phone: Not on file     Gets together: Not on file     Attends Sabianism service: Not on file     Active member of club or organization: Not on file     Attends meetings of clubs or organizations: Not on file     Relationship status: Not on file   Other Topics Concern    Not on file   Social History Narrative    Work: Manager at [employer] insightsical activity: None.Hobbies: Fishing.       Current Outpatient Medications   Medication Sig Dispense Refill     insulin regular U-500 concentrated (HUMULIN R U-500, CONC, INSULIN) 500 unit/mL Soln Inject 40-45 Units into the skin 4 (four) times  daily before meals and nightly. using U500 syringes only       ACCU-CHEK JANEY PLUS TEST STRP Strp Inject 1 strip into the skin 3 (three) times daily. 300 strip 3    apixaban (ELIQUIS) 2.5 mg Tab Take 2.5 mg by mouth 2 (two) times daily.      betamethasone dipropionate (DIPROLENE) 0.05 % cream Apply 1 application topically as needed (to affected area ON LEGS).       blood-glucose meter (ACCU-CHEK JANEY PLUS METER) Misc Inject 1 each into the skin 3 (three) times daily. 1 each 0    butalbital-acetaminophen-caffeine -40 mg (FIORICET, ESGIC) -40 mg per tablet Take 1 tablet by mouth every 4 (four) hours as needed for Pain or Headaches. 90 tablet 2    CARAFATE 100 mg/mL suspension Take 10 mLs (1 g total) by mouth 4 (four) times daily with meals and nightly. 1200 mL 5    clopidogrel (PLAVIX) 75 mg tablet Take 75 mg by mouth every morning.      DEXTROSE (GLUCOSE) Chew Take by mouth as needed.       DULoxetine (CYMBALTA) 30 MG capsule Take 30 mg by mouth every evening.      ferrous sulfate (FEOSOL) 325 mg (65 mg iron) Tab tablet Take 1 tablet (325 mg total) by mouth every morning. 90 tablet 3    fexofenadine (ALLEGRA) 180 MG tablet Take 180 mg by mouth every morning.       fluocinolone and shower cap 0.01 % Oil fluocinolone 0.01 % scalp oil and shower cap   APPLY TO THE SCALP AND BEHIND EARS QHS      fluticasone (FLONASE) 50 mcg/actuation nasal spray SHAKE LIQUID AND USE 2 SPRAYS IN EACH NOSTRIL EVERY DAY (Patient taking differently: SHAKE LIQUID AND USE 2 SPRAYS IN EACH NOSTRIL EVERY DAY Q HS) 16 mL 6    furosemide (LASIX) 80 MG tablet Take 80 mg by mouth 2 (two) times daily.      gabapentin (NEURONTIN) 600 MG tablet TAKE 1 TABLET(600 MG) BY MOUTH 2 HOURS AFTER MEALS AND AT BEDTIME (Patient taking differently: Take 600 mg by mouth 4 (four) times daily. TAKE 1 TABLET(600 MG) BY MOUTH 2 HOURS AFTER MEALS AND AT BEDTIME) 360 tablet 3    guaifenesin (MUCINEX) 600 mg 12 hr tablet Take 600 mg by mouth  "as needed for Congestion. Prn      HYDROcodone-acetaminophen (NORCO)  mg per tablet Take 1 tablet by mouth every 6 (six) hours as needed for Pain. 120 tablet 0    [START ON 5/17/2019] HYDROcodone-acetaminophen (NORCO)  mg per tablet Take 1 tablet by mouth every 6 (six) hours as needed for Pain. 120 tablet 0    [START ON 6/17/2019] HYDROcodone-acetaminophen (NORCO)  mg per tablet Take 1 tablet by mouth every 6 (six) hours as needed for Pain. 120 tablet 0    hydrocortisone 2.5 % cream Apply 1 application topically as needed (leg wounds).   0    insulin U-500 syringe-needle 1/2 mL 31 gauge x 15/64" Syrg 1 Syringe by Misc.(Non-Drug; Combo Route) route 3 (three) times daily. 300 Syringe 3    isosorbide mononitrate (IMDUR) 30 MG 24 hr tablet TAKE 1 TABLET(30 MG) BY MOUTH EVERY DAY (Patient taking differently: TAKE 1 TABLET(30 MG) BY MOUTH EVERY DAY q HS) 90 tablet 3    krill-om3-dha-epa-om6-lip-astx (KRILL OIL, OMEGA 3 AND 6,) 1000-130(40-80) mg Cap Take 2 capsules by mouth 2 (two) times daily.      levothyroxine (SYNTHROID) 25 MCG tablet Take 1 tablet (25 mcg total) by mouth once daily. (Patient taking differently: Take 25 mcg by mouth before breakfast. ) 90 tablet 3    lipase-protease-amylase 24,000-76,000-120,000 units (CREON) 24,000-76,000 -120,000 unit capsule TAKE 2 CAPSULES BY MOUTH THREE TIMES DAILY WITH MEALS AND 1 CAPSULE BY MOUTH WITH A SNACK (Patient taking differently: Take 1 capsule by mouth 4 (four) times daily with meals and nightly. With means and with a snack) 210 capsule 11    losartan (COZAAR) 50 MG tablet Take 1 tablet (50 mg total) by mouth once daily. 90 tablet 3    metOLazone (ZAROXOLYN) 5 MG tablet Take 1 tablet (5 mg total) by mouth daily as needed (Weight gain of 5 pounds or more). 30 tablet 6    metoprolol tartrate (LOPRESSOR) 50 MG tablet Take 1 tablet (50 mg total) by mouth 2 (two) times daily. 60 tablet 11    NEVANAC 0.1 % ophthalmic suspension Place 1 drop " into both eyes daily as needed (burning in eyes).       nitroGLYCERIN (NITROSTAT) 0.4 MG SL tablet Place 1 tablet (0.4 mg total) under the tongue every 5 (five) minutes as needed. (Patient taking differently: Place 0.4 mg under the tongue every 5 (five) minutes as needed for Chest pain. ) 25 tablet 6    olopatadine (PATADAY) 0.2 % Drop Place 1 drop into both eyes once daily.       omeg3/epa/dha/fish oil/flax/E (THERA TEARS NUTRITION ORAL) Take 1 capsule by mouth 2 (two) times daily.       ondansetron (ZOFRAN) 4 MG tablet TAKE 1 TABLET(4 MG) BY MOUTH EVERY 8 HOURS AS NEEDED FOR NAUSEA 30 tablet 6    pantoprazole (PROTONIX) 40 MG tablet TAKE 1 TABLET(40 MG) BY MOUTH TWICE DAILY 180 tablet 3    polyethylene glycol (GLYCOLAX) 17 gram/dose powder MIX AND DRINK 17 GRAMS BY MOUTH EVERY  g 6    REPATHA PUSHTRONEX 420 mg/3.5 mL Injt INJ ONCE A MONTH  11    simethicone (MYLICON) 125 MG chewable tablet Take 125 mg by mouth every 6 (six) hours as needed for Flatulence.      SSD 1 % cream APPLY EXTERNALLY TO THE AFFECTED AREA EVERY DAY (Patient taking differently: APPLY EXTERNALLY TO THE AFFECTED AREA EVERY DAY, BILAT LEGS) 85 g 0    suvorexant (BELSOMRA) 10 mg Tab Take 1 tablet by mouth every evening. 30 tablet 3    tamsulosin (FLOMAX) 0.4 mg Cap Take 1 capsule (0.4 mg total) by mouth once daily. (Patient taking differently: Take 1 capsule by mouth every evening. ) 90 capsule 3    triamcinolone acetonide 0.1% (KENALOG) 0.1 % cream APPLY 1 APPLICATION ON THE SKIN DAILY PRN TX LEG DERMATITIS  0     No current facility-administered medications for this visit.        Review of patient's allergies indicates:   Allergen Reactions    Cephalexin Hives and Shortness Of Breath    Penicillins Other (See Comments)     Unknown  Hardened skin    Pravastatin Other (See Comments)     myalgia    Sulfa (sulfonamide antibiotics)      Other reaction(s): Unknown    Adhesive Rash     Review of Systems   Constitutional:  Negative for activity change, appetite change, chills, diaphoresis, fatigue, fever and unexpected weight change.   HENT: Negative for trouble swallowing and voice change.    Eyes: Negative for pain and visual disturbance.   Respiratory: Negative for chest tightness, shortness of breath and wheezing.    Cardiovascular: Negative for chest pain, palpitations and leg swelling.   Gastrointestinal: Negative for abdominal pain, constipation and diarrhea.   Genitourinary: Negative for difficulty urinating, frequency and urgency.   Musculoskeletal: Positive for back pain and neck pain. Negative for arthralgias, joint swelling, myalgias and neck stiffness.   Skin: Negative for rash and wound.   Neurological: Positive for headaches. Negative for dizziness, facial asymmetry, speech difficulty and light-headedness.   Hematological: Negative for adenopathy.   Psychiatric/Behavioral: Negative for agitation, behavioral problems, confusion, decreased concentration, dysphoric mood and sleep disturbance.         Objective:      Physical Exam    GENERAL: The patient is alert, oriented, pleasant, obese.   MUSCULOSKELETAL:   Gait is normal, walks without cane, on wide basis, small steps, improved balance.  Cervical spine full range of motion in all planes.   Lumbar spine, decreased active range of motion in all planes, flexion to 90 degrees, with shooting,   Side bending and rotation to 20-25 degrees bilateral,    Positive facet loading bilateral, R>L.    Straight leg raising positive bilaterally.   Full range of motion in all joints x4 extremities, except in Rt hand, has difficulties making fist, secondary to swelling in Rt > Lt hand.  Muscle strength 5/5 throughout x4 extremities.   No  joint laxity throughout x4 extremities.   Rt  hand  incomplete, weak, can bend all fingers but pinky and ring finger flexion is delayed.  C/w ? Tiger finger/ tenosynovitis.  NEUROLOGIC:  AOx3,  Cranial nerves II through XII intact.   Deep tendon  reflexes is normal, +2 in the upper and lower extremities bilaterally.   Tinel sign positive in Rt, neg on Lt.  Muscle tone is normal.     Sensory is intact to light touch and pinprick throughout x 4 extremities.     Xray of Lumbar spine.   Lumbar spine demonstrates mild to moderate DJD. Marginal osteophytes seen at multiple levels.   Facet hypertrophy seen at the lower levels. Vertebral body heights are fairly well maintained.   Flexion-extension views show no evidence of instability     MRI of Lumbar spine showed ( 10/23/14):   L4-5: Circumferential disk bulge, bilateral facet arthropathy, and ligamentum flavum hypertrophy results in moderate bilateral neural foraminal narrowing    and no significant spinal canal narrowing.  L5-S1: Severe bilateral facet arthropathy and ligament flavum hypertrophy resulting in significant spinal canal or neural foraminal narrowing.   Impression:  Multilevel lumbar spondylosis as detailed above resulting in moderate bilateral neural foraminal narrowing at L4-5 and mild bilateral neural foraminal narrowing at L3-4.    Assessment:           1. Chronic low back pain with sciatica, sciatica laterality unspecified, unspecified back pain laterality    2. Chronic bilateral low back pain without sciatica    3. Lumbar radiculopathy    4. Leg weakness, bilateral    5. Chronic tension-type headache, not intractable    6. Opioid use agreement exists    7. Diabetic peripheral neuropathy    8. Chronic neck pain    9. Carpal tunnel syndrome of right wrist    10. DDD (degenerative disc disease), cervical    11. Spondylitis, cervical    12. Primary osteoarthritis of right hand    13. Long-term current use of opiate analgesic    14. Chronic left hip pain    15. Nonintractable headache, unspecified chronicity pattern, unspecified headache type        Plan:       Chronic low back pain with sciatica, sciatica laterality unspecified, unspecified back pain laterality  -     HYDROcodone-acetaminophen  (NORCO)  mg per tablet; Take 1 tablet by mouth every 6 (six) hours as needed for Pain.  Dispense: 120 tablet; Refill: 0  -     HYDROcodone-acetaminophen (NORCO)  mg per tablet; Take 1 tablet by mouth every 6 (six) hours as needed for Pain.  Dispense: 120 tablet; Refill: 0  -     HYDROcodone-acetaminophen (NORCO)  mg per tablet; Take 1 tablet by mouth every 6 (six) hours as needed for Pain.  Dispense: 120 tablet; Refill: 0  -     butalbital-acetaminophen-caffeine -40 mg (FIORICET, ESGIC) -40 mg per tablet; Take 1 tablet by mouth every 4 (four) hours as needed for Pain or Headaches.  Dispense: 90 tablet; Refill: 2    Chronic bilateral low back pain without sciatica  -     HYDROcodone-acetaminophen (NORCO)  mg per tablet; Take 1 tablet by mouth every 6 (six) hours as needed for Pain.  Dispense: 120 tablet; Refill: 0  -     HYDROcodone-acetaminophen (NORCO)  mg per tablet; Take 1 tablet by mouth every 6 (six) hours as needed for Pain.  Dispense: 120 tablet; Refill: 0  -     HYDROcodone-acetaminophen (NORCO)  mg per tablet; Take 1 tablet by mouth every 6 (six) hours as needed for Pain.  Dispense: 120 tablet; Refill: 0  -     butalbital-acetaminophen-caffeine -40 mg (FIORICET, ESGIC) -40 mg per tablet; Take 1 tablet by mouth every 4 (four) hours as needed for Pain or Headaches.  Dispense: 90 tablet; Refill: 2    Lumbar radiculopathy  -     HYDROcodone-acetaminophen (NORCO)  mg per tablet; Take 1 tablet by mouth every 6 (six) hours as needed for Pain.  Dispense: 120 tablet; Refill: 0  -     HYDROcodone-acetaminophen (NORCO)  mg per tablet; Take 1 tablet by mouth every 6 (six) hours as needed for Pain.  Dispense: 120 tablet; Refill: 0  -     HYDROcodone-acetaminophen (NORCO)  mg per tablet; Take 1 tablet by mouth every 6 (six) hours as needed for Pain.  Dispense: 120 tablet; Refill: 0  -     butalbital-acetaminophen-caffeine -40 mg  (FIORICET, ESGIC) -40 mg per tablet; Take 1 tablet by mouth every 4 (four) hours as needed for Pain or Headaches.  Dispense: 90 tablet; Refill: 2    Leg weakness, bilateral  -     HYDROcodone-acetaminophen (NORCO)  mg per tablet; Take 1 tablet by mouth every 6 (six) hours as needed for Pain.  Dispense: 120 tablet; Refill: 0  -     HYDROcodone-acetaminophen (NORCO)  mg per tablet; Take 1 tablet by mouth every 6 (six) hours as needed for Pain.  Dispense: 120 tablet; Refill: 0  -     HYDROcodone-acetaminophen (NORCO)  mg per tablet; Take 1 tablet by mouth every 6 (six) hours as needed for Pain.  Dispense: 120 tablet; Refill: 0  -     butalbital-acetaminophen-caffeine -40 mg (FIORICET, ESGIC) -40 mg per tablet; Take 1 tablet by mouth every 4 (four) hours as needed for Pain or Headaches.  Dispense: 90 tablet; Refill: 2    Chronic tension-type headache, not intractable  -     HYDROcodone-acetaminophen (NORCO)  mg per tablet; Take 1 tablet by mouth every 6 (six) hours as needed for Pain.  Dispense: 120 tablet; Refill: 0  -     HYDROcodone-acetaminophen (NORCO)  mg per tablet; Take 1 tablet by mouth every 6 (six) hours as needed for Pain.  Dispense: 120 tablet; Refill: 0  -     HYDROcodone-acetaminophen (NORCO)  mg per tablet; Take 1 tablet by mouth every 6 (six) hours as needed for Pain.  Dispense: 120 tablet; Refill: 0  -     butalbital-acetaminophen-caffeine -40 mg (FIORICET, ESGIC) -40 mg per tablet; Take 1 tablet by mouth every 4 (four) hours as needed for Pain or Headaches.  Dispense: 90 tablet; Refill: 2    Opioid use agreement exists    Diabetic peripheral neuropathy  -     HYDROcodone-acetaminophen (NORCO)  mg per tablet; Take 1 tablet by mouth every 6 (six) hours as needed for Pain.  Dispense: 120 tablet; Refill: 0  -     HYDROcodone-acetaminophen (NORCO)  mg per tablet; Take 1 tablet by mouth every 6 (six) hours as needed for Pain.   Dispense: 120 tablet; Refill: 0  -     HYDROcodone-acetaminophen (NORCO)  mg per tablet; Take 1 tablet by mouth every 6 (six) hours as needed for Pain.  Dispense: 120 tablet; Refill: 0  -     butalbital-acetaminophen-caffeine -40 mg (FIORICET, ESGIC) -40 mg per tablet; Take 1 tablet by mouth every 4 (four) hours as needed for Pain or Headaches.  Dispense: 90 tablet; Refill: 2    Chronic neck pain  -     HYDROcodone-acetaminophen (NORCO)  mg per tablet; Take 1 tablet by mouth every 6 (six) hours as needed for Pain.  Dispense: 120 tablet; Refill: 0  -     HYDROcodone-acetaminophen (NORCO)  mg per tablet; Take 1 tablet by mouth every 6 (six) hours as needed for Pain.  Dispense: 120 tablet; Refill: 0  -     HYDROcodone-acetaminophen (NORCO)  mg per tablet; Take 1 tablet by mouth every 6 (six) hours as needed for Pain.  Dispense: 120 tablet; Refill: 0  -     butalbital-acetaminophen-caffeine -40 mg (FIORICET, ESGIC) -40 mg per tablet; Take 1 tablet by mouth every 4 (four) hours as needed for Pain or Headaches.  Dispense: 90 tablet; Refill: 2    Carpal tunnel syndrome of right wrist  -     HYDROcodone-acetaminophen (NORCO)  mg per tablet; Take 1 tablet by mouth every 6 (six) hours as needed for Pain.  Dispense: 120 tablet; Refill: 0  -     HYDROcodone-acetaminophen (NORCO)  mg per tablet; Take 1 tablet by mouth every 6 (six) hours as needed for Pain.  Dispense: 120 tablet; Refill: 0  -     HYDROcodone-acetaminophen (NORCO)  mg per tablet; Take 1 tablet by mouth every 6 (six) hours as needed for Pain.  Dispense: 120 tablet; Refill: 0  -     butalbital-acetaminophen-caffeine -40 mg (FIORICET, ESGIC) -40 mg per tablet; Take 1 tablet by mouth every 4 (four) hours as needed for Pain or Headaches.  Dispense: 90 tablet; Refill: 2    DDD (degenerative disc disease), cervical  -     HYDROcodone-acetaminophen (NORCO)  mg per tablet; Take 1 tablet by  mouth every 6 (six) hours as needed for Pain.  Dispense: 120 tablet; Refill: 0  -     HYDROcodone-acetaminophen (NORCO)  mg per tablet; Take 1 tablet by mouth every 6 (six) hours as needed for Pain.  Dispense: 120 tablet; Refill: 0  -     HYDROcodone-acetaminophen (NORCO)  mg per tablet; Take 1 tablet by mouth every 6 (six) hours as needed for Pain.  Dispense: 120 tablet; Refill: 0  -     butalbital-acetaminophen-caffeine -40 mg (FIORICET, ESGIC) -40 mg per tablet; Take 1 tablet by mouth every 4 (four) hours as needed for Pain or Headaches.  Dispense: 90 tablet; Refill: 2    Spondylitis, cervical  -     HYDROcodone-acetaminophen (NORCO)  mg per tablet; Take 1 tablet by mouth every 6 (six) hours as needed for Pain.  Dispense: 120 tablet; Refill: 0  -     HYDROcodone-acetaminophen (NORCO)  mg per tablet; Take 1 tablet by mouth every 6 (six) hours as needed for Pain.  Dispense: 120 tablet; Refill: 0  -     HYDROcodone-acetaminophen (NORCO)  mg per tablet; Take 1 tablet by mouth every 6 (six) hours as needed for Pain.  Dispense: 120 tablet; Refill: 0  -     butalbital-acetaminophen-caffeine -40 mg (FIORICET, ESGIC) -40 mg per tablet; Take 1 tablet by mouth every 4 (four) hours as needed for Pain or Headaches.  Dispense: 90 tablet; Refill: 2    Primary osteoarthritis of right hand  -     HYDROcodone-acetaminophen (NORCO)  mg per tablet; Take 1 tablet by mouth every 6 (six) hours as needed for Pain.  Dispense: 120 tablet; Refill: 0  -     HYDROcodone-acetaminophen (NORCO)  mg per tablet; Take 1 tablet by mouth every 6 (six) hours as needed for Pain.  Dispense: 120 tablet; Refill: 0  -     HYDROcodone-acetaminophen (NORCO)  mg per tablet; Take 1 tablet by mouth every 6 (six) hours as needed for Pain.  Dispense: 120 tablet; Refill: 0  -     butalbital-acetaminophen-caffeine -40 mg (FIORICET, ESGIC) -40 mg per tablet; Take 1 tablet by mouth  every 4 (four) hours as needed for Pain or Headaches.  Dispense: 90 tablet; Refill: 2    Long-term current use of opiate analgesic  -     HYDROcodone-acetaminophen (NORCO)  mg per tablet; Take 1 tablet by mouth every 6 (six) hours as needed for Pain.  Dispense: 120 tablet; Refill: 0  -     HYDROcodone-acetaminophen (NORCO)  mg per tablet; Take 1 tablet by mouth every 6 (six) hours as needed for Pain.  Dispense: 120 tablet; Refill: 0  -     HYDROcodone-acetaminophen (NORCO)  mg per tablet; Take 1 tablet by mouth every 6 (six) hours as needed for Pain.  Dispense: 120 tablet; Refill: 0  -     butalbital-acetaminophen-caffeine -40 mg (FIORICET, ESGIC) -40 mg per tablet; Take 1 tablet by mouth every 4 (four) hours as needed for Pain or Headaches.  Dispense: 90 tablet; Refill: 2    Chronic left hip pain  -     HYDROcodone-acetaminophen (NORCO)  mg per tablet; Take 1 tablet by mouth every 6 (six) hours as needed for Pain.  Dispense: 120 tablet; Refill: 0  -     HYDROcodone-acetaminophen (NORCO)  mg per tablet; Take 1 tablet by mouth every 6 (six) hours as needed for Pain.  Dispense: 120 tablet; Refill: 0  -     HYDROcodone-acetaminophen (NORCO)  mg per tablet; Take 1 tablet by mouth every 6 (six) hours as needed for Pain.  Dispense: 120 tablet; Refill: 0  -     butalbital-acetaminophen-caffeine -40 mg (FIORICET, ESGIC) -40 mg per tablet; Take 1 tablet by mouth every 4 (four) hours as needed for Pain or Headaches.  Dispense: 90 tablet; Refill: 2    Nonintractable headache, unspecified chronicity pattern, unspecified headache type  -     HYDROcodone-acetaminophen (NORCO)  mg per tablet; Take 1 tablet by mouth every 6 (six) hours as needed for Pain.  Dispense: 120 tablet; Refill: 0  -     HYDROcodone-acetaminophen (NORCO)  mg per tablet; Take 1 tablet by mouth every 6 (six) hours as needed for Pain.  Dispense: 120 tablet; Refill: 0  -      HYDROcodone-acetaminophen (NORCO)  mg per tablet; Take 1 tablet by mouth every 6 (six) hours as needed for Pain.  Dispense: 120 tablet; Refill: 0  -     butalbital-acetaminophen-caffeine -40 mg (FIORICET, ESGIC) -40 mg per tablet; Take 1 tablet by mouth every 4 (four) hours as needed for Pain or Headaches.  Dispense: 90 tablet; Refill: 2    Patient with worsening of chronic back pain, secondary to lumbar spondylosis, facet arthropathy, and possible Rt L5-S1 lumbar radiculopathy.  Also with worsening of Headache that is responding to Fioricet well.    1. Pain management:   Hydrocodone 10/325 mg po q6 hrs prn pain, decreased monthly and daily dose from #150/months or 5 tabs/day down to #120 ( max 4 tabs/day) with 2 refills.  He is not happy but agreeable.  We had an extended talk about further decrease in opioid therapy, and increase activity, to improve function.   Discussed eight loss, and core strengthening (water therapy, stationary biking and walking seems to be the best exercise for him).  Will resume  Neurontin 600 mg po tid- qid if tolerated ( one in am / noon/ at 2 at bedtime).    2. Safe opiate management:  Opiate risk tool completed, moderate risk l evel , at 6.  Opioid Risk Score       Value Time User    Opioid Risk Score  6 12/25/2017  3:04 AM Rhea Gaviria MD         reviewed and appropriate.   Hydrocodone refills on 3/7, 1/14, 11/29/18.   Fioricet #90 tabs, 10/6, 8/21, 7/21/18.  Urine drug screen one on 12/21/17 ( + for opiates).      Follow up in 4 months.     Total time spent face to face with patient was 25 minutes.   More than 50% of that time was spent in counseling on diagnosis , prognosis and treatment options.   I also  patient  on common and most usual side effect of prescribed medications. Risk and benefits of opiates, possible risk of developing opiate dependence and tolerance, need of strict compliance with prescribed medications.  Pain contract, rules and  obligations were discussed with patient in details.  He is aware that I would be the only doctor prescribing him pain medications and ED in a case of emergency.  I reviewed Primary care , and other specialty's notes to better coordinate patient's  care.   All questions were answered, and patient voiced understanding.

## 2019-05-03 DIAGNOSIS — I10 ESSENTIAL HYPERTENSION: ICD-10-CM

## 2019-05-03 DIAGNOSIS — I50.30 HEART FAILURE WITH PRESERVED EJECTION FRACTION: ICD-10-CM

## 2019-05-03 RX ORDER — FUROSEMIDE 40 MG/1
TABLET ORAL
Qty: 90 TABLET | Refills: 0 | Status: SHIPPED | OUTPATIENT
Start: 2019-05-03

## 2019-05-04 RX ORDER — POLYETHYLENE GLYCOL 3350 17 G/17G
POWDER, FOR SOLUTION ORAL
Qty: 510 G | Refills: 0 | Status: SHIPPED | OUTPATIENT
Start: 2019-05-04 | End: 2019-06-02 | Stop reason: SDUPTHER

## 2019-05-04 RX ORDER — POLYETHYLENE GLYCOL 3350 17 G/17G
POWDER, FOR SOLUTION ORAL
Qty: 510 G | Refills: 0 | Status: SHIPPED | OUTPATIENT
Start: 2019-05-04

## 2019-05-28 PROBLEM — I87.311 CHRONIC VENOUS HYPERTENSION (IDIOPATHIC) WITH ULCER OF RIGHT LOWER EXTREMITY: Status: ACTIVE | Noted: 2019-05-28

## 2019-05-28 PROBLEM — L97.919 CHRONIC VENOUS HYPERTENSION (IDIOPATHIC) WITH ULCER OF RIGHT LOWER EXTREMITY: Status: ACTIVE | Noted: 2019-05-28

## 2019-05-28 PROBLEM — I87.302 IDIOPATHIC CHRONIC VENOUS HYPERTENSION OF LEFT LOWER EXTREMITY WITHOUT COMPLICATIONS: Status: ACTIVE | Noted: 2019-05-28

## 2019-06-02 RX ORDER — POLYETHYLENE GLYCOL 3350 17 G/17G
POWDER, FOR SOLUTION ORAL
Qty: 510 G | Refills: 0 | Status: SHIPPED | OUTPATIENT
Start: 2019-06-02

## 2019-06-12 PROBLEM — L97.912 NON-PRESSURE CHRONIC ULCER OF RIGHT LOWER LEG WITH FAT LAYER EXPOSED: Status: ACTIVE | Noted: 2019-06-12

## 2019-06-12 PROBLEM — L97.922 NON-PRESSURE CHRONIC ULCER OF LEFT LOWER LEG WITH FAT LAYER EXPOSED: Status: ACTIVE | Noted: 2019-06-12
